# Patient Record
Sex: MALE | Race: WHITE | Employment: FULL TIME | URBAN - METROPOLITAN AREA
[De-identification: names, ages, dates, MRNs, and addresses within clinical notes are randomized per-mention and may not be internally consistent; named-entity substitution may affect disease eponyms.]

---

## 2017-03-16 ENCOUNTER — TELEPHONE (OUTPATIENT)
Dept: FAMILY MEDICINE CLINIC | Age: 42
End: 2017-03-16

## 2017-03-21 DIAGNOSIS — J30.1 SEASONAL ALLERGIC RHINITIS DUE TO POLLEN: ICD-10-CM

## 2017-03-22 ENCOUNTER — OFFICE VISIT (OUTPATIENT)
Dept: FAMILY MEDICINE CLINIC | Age: 42
End: 2017-03-22

## 2017-03-22 VITALS
SYSTOLIC BLOOD PRESSURE: 112 MMHG | BODY MASS INDEX: 23.84 KG/M2 | HEIGHT: 72 IN | DIASTOLIC BLOOD PRESSURE: 64 MMHG | WEIGHT: 176 LBS

## 2017-03-22 DIAGNOSIS — K58.0 IRRITABLE BOWEL SYNDROME WITH DIARRHEA: ICD-10-CM

## 2017-03-22 DIAGNOSIS — A60.02 HERPES SIMPLEX OF MALE GENITALIA: ICD-10-CM

## 2017-03-22 DIAGNOSIS — S90.122A CONTUSION OF LEFT LESSER TOE(S) WITHOUT DAMAGE TO NAIL, INITIAL ENCOUNTER: ICD-10-CM

## 2017-03-22 DIAGNOSIS — J30.1 SEASONAL ALLERGIC RHINITIS DUE TO POLLEN: ICD-10-CM

## 2017-03-22 DIAGNOSIS — Z31.41 FERTILITY TESTING: ICD-10-CM

## 2017-03-22 DIAGNOSIS — E34.9 TESTOSTERONE DEFICIENCY: ICD-10-CM

## 2017-03-22 DIAGNOSIS — L82.1 SEBORRHEIC KERATOSIS: ICD-10-CM

## 2017-03-22 DIAGNOSIS — F41.9 ANXIETY: Primary | ICD-10-CM

## 2017-03-22 PROCEDURE — 99213 OFFICE O/P EST LOW 20 MIN: CPT | Performed by: FAMILY MEDICINE

## 2017-03-24 ENCOUNTER — TELEPHONE (OUTPATIENT)
Dept: FAMILY MEDICINE CLINIC | Age: 42
End: 2017-03-24

## 2017-03-27 DIAGNOSIS — Z31.41 ENCOUNTER FOR SEMEN ANALYSIS: Primary | ICD-10-CM

## 2017-03-27 DIAGNOSIS — Z31.41 FERTILITY TESTING: ICD-10-CM

## 2017-04-04 ENCOUNTER — OFFICE VISIT (OUTPATIENT)
Dept: FAMILY MEDICINE CLINIC | Age: 42
End: 2017-04-04

## 2017-04-04 VITALS
HEIGHT: 72 IN | WEIGHT: 175 LBS | BODY MASS INDEX: 23.7 KG/M2 | SYSTOLIC BLOOD PRESSURE: 120 MMHG | DIASTOLIC BLOOD PRESSURE: 80 MMHG

## 2017-04-04 DIAGNOSIS — F43.21 SITUATIONAL DEPRESSION: ICD-10-CM

## 2017-04-04 DIAGNOSIS — S46.011A ROTATOR CUFF STRAIN, RIGHT, INITIAL ENCOUNTER: Primary | ICD-10-CM

## 2017-04-04 PROCEDURE — 99213 OFFICE O/P EST LOW 20 MIN: CPT | Performed by: FAMILY MEDICINE

## 2017-04-04 ASSESSMENT — ENCOUNTER SYMPTOMS
DIARRHEA: 0
VOMITING: 0
ABDOMINAL DISTENTION: 0
NAUSEA: 0
ABDOMINAL PAIN: 0
CONSTIPATION: 0
BLOOD IN STOOL: 0

## 2017-07-12 ENCOUNTER — OFFICE VISIT (OUTPATIENT)
Dept: FAMILY MEDICINE CLINIC | Age: 42
End: 2017-07-12

## 2017-07-12 VITALS
DIASTOLIC BLOOD PRESSURE: 80 MMHG | HEIGHT: 72 IN | BODY MASS INDEX: 23.7 KG/M2 | WEIGHT: 175 LBS | SYSTOLIC BLOOD PRESSURE: 120 MMHG

## 2017-07-12 DIAGNOSIS — K58.0 IRRITABLE BOWEL SYNDROME WITH DIARRHEA: ICD-10-CM

## 2017-07-12 DIAGNOSIS — L73.9 FOLLICULITIS: ICD-10-CM

## 2017-07-12 DIAGNOSIS — F41.9 ANXIETY: Primary | ICD-10-CM

## 2017-07-12 DIAGNOSIS — B00.89 HERPES DERMATITIS: ICD-10-CM

## 2017-07-12 DIAGNOSIS — J30.1 SEASONAL ALLERGIC RHINITIS DUE TO POLLEN: ICD-10-CM

## 2017-07-12 DIAGNOSIS — E34.9 TESTOSTERONE DEFICIENCY: ICD-10-CM

## 2017-07-12 PROCEDURE — 99213 OFFICE O/P EST LOW 20 MIN: CPT | Performed by: FAMILY MEDICINE

## 2017-07-12 RX ORDER — CEPHALEXIN 500 MG/1
500 CAPSULE ORAL 3 TIMES DAILY
Qty: 21 CAPSULE | Refills: 0 | Status: SHIPPED | OUTPATIENT
Start: 2017-07-12 | End: 2017-07-27 | Stop reason: ALTCHOICE

## 2017-07-24 ENCOUNTER — TELEPHONE (OUTPATIENT)
Dept: ENDOCRINOLOGY | Age: 42
End: 2017-07-24

## 2017-07-24 ENCOUNTER — TELEPHONE (OUTPATIENT)
Dept: FAMILY MEDICINE CLINIC | Age: 42
End: 2017-07-24

## 2017-07-27 ENCOUNTER — OFFICE VISIT (OUTPATIENT)
Dept: ENDOCRINOLOGY | Age: 42
End: 2017-07-27

## 2017-07-27 VITALS
HEART RATE: 90 BPM | BODY MASS INDEX: 23.65 KG/M2 | RESPIRATION RATE: 16 BRPM | SYSTOLIC BLOOD PRESSURE: 120 MMHG | WEIGHT: 174.6 LBS | HEIGHT: 72 IN | DIASTOLIC BLOOD PRESSURE: 78 MMHG

## 2017-07-27 DIAGNOSIS — R79.89 LOW TESTOSTERONE LEVEL IN MALE: ICD-10-CM

## 2017-07-27 DIAGNOSIS — R79.89 LOW TESTOSTERONE: Primary | ICD-10-CM

## 2017-07-27 DIAGNOSIS — R53.83 FATIGUE, UNSPECIFIED TYPE: ICD-10-CM

## 2017-07-27 PROCEDURE — 99204 OFFICE O/P NEW MOD 45 MIN: CPT | Performed by: INTERNAL MEDICINE

## 2017-07-28 DIAGNOSIS — R53.83 OTHER FATIGUE: ICD-10-CM

## 2017-07-28 DIAGNOSIS — R79.89 LOW TESTOSTERONE LEVEL IN MALE: ICD-10-CM

## 2017-07-28 LAB
A/G RATIO: 1.7 (ref 1.1–2.2)
ALBUMIN SERPL-MCNC: 4.3 G/DL (ref 3.4–5)
ALP BLD-CCNC: 61 U/L (ref 40–129)
ALT SERPL-CCNC: 28 U/L (ref 10–40)
ANION GAP SERPL CALCULATED.3IONS-SCNC: 11 MMOL/L (ref 3–16)
AST SERPL-CCNC: 22 U/L (ref 15–37)
BILIRUB SERPL-MCNC: 0.8 MG/DL (ref 0–1)
BUN BLDV-MCNC: 23 MG/DL (ref 7–20)
CALCIUM SERPL-MCNC: 9.6 MG/DL (ref 8.3–10.6)
CHLORIDE BLD-SCNC: 102 MMOL/L (ref 99–110)
CO2: 28 MMOL/L (ref 21–32)
CREAT SERPL-MCNC: 0.9 MG/DL (ref 0.9–1.3)
FERRITIN: 273.9 NG/ML (ref 30–400)
FOLLICLE STIMULATING HORMONE: 7.4 MIU/ML
GFR AFRICAN AMERICAN: >60
GFR NON-AFRICAN AMERICAN: >60
GLOBULIN: 2.6 G/DL
GLUCOSE BLD-MCNC: 76 MG/DL (ref 70–99)
HCT VFR BLD CALC: 44.1 % (ref 40.5–52.5)
HEMOGLOBIN: 14.9 G/DL (ref 13.5–17.5)
LUTEINIZING HORMONE: 4.4 MIU/ML
MCH RBC QN AUTO: 33.4 PG (ref 26–34)
MCHC RBC AUTO-ENTMCNC: 33.7 G/DL (ref 31–36)
MCV RBC AUTO: 99.1 FL (ref 80–100)
PDW BLD-RTO: 13.3 % (ref 12.4–15.4)
PLATELET # BLD: 195 K/UL (ref 135–450)
PMV BLD AUTO: 9.5 FL (ref 5–10.5)
POTASSIUM SERPL-SCNC: 4.7 MMOL/L (ref 3.5–5.1)
PROLACTIN: 10.4 NG/ML
RBC # BLD: 4.45 M/UL (ref 4.2–5.9)
SODIUM BLD-SCNC: 141 MMOL/L (ref 136–145)
TOTAL PROTEIN: 6.9 G/DL (ref 6.4–8.2)
TSH REFLEX: 1.64 UIU/ML (ref 0.27–4.2)
VITAMIN B-12: 852 PG/ML (ref 211–911)
VITAMIN D 25-HYDROXY: 33.3 NG/ML
WBC # BLD: 3.8 K/UL (ref 4–11)

## 2017-07-29 LAB
SEX HORMONE BINDING GLOBULIN: 37 NMOL/L (ref 11–80)
TESTOSTERONE FREE-NONMALE: 73.7 PG/ML (ref 47–244)
TESTOSTERONE TOTAL: 390 NG/DL (ref 220–1000)

## 2017-07-31 ENCOUNTER — TELEPHONE (OUTPATIENT)
Dept: ENDOCRINOLOGY | Age: 42
End: 2017-07-31

## 2017-07-31 RX ORDER — TADALAFIL 10 MG/1
10 TABLET ORAL PRN
Qty: 10 TABLET | Refills: 1 | Status: SHIPPED | OUTPATIENT
Start: 2017-07-31 | End: 2017-11-14 | Stop reason: SDUPTHER

## 2017-08-23 LAB
REASON FOR REJECTION: NORMAL
REJECTED TEST: NORMAL

## 2017-08-24 ENCOUNTER — TELEPHONE (OUTPATIENT)
Dept: ENDOCRINOLOGY | Age: 42
End: 2017-08-24

## 2017-08-24 DIAGNOSIS — N52.9 ERECTILE DYSFUNCTION, UNSPECIFIED ERECTILE DYSFUNCTION TYPE: Primary | ICD-10-CM

## 2017-09-01 LAB
SEX HORMONE BINDING GLOBULIN: 33 NMOL/L (ref 11–80)
TESTOSTERONE FREE-NONMALE: 76.1 PG/ML (ref 47–244)
TESTOSTERONE TOTAL: 377 NG/DL (ref 220–1000)

## 2017-10-11 NOTE — PROGRESS NOTES
reviewed  Medications refilled   Medrol Dosepak as directed  Rotator Cuff Exercises Given  Rest the shoulder until normal  Flu Shot Declined  RTO 3 months for Anxiety / IBS

## 2017-10-12 ENCOUNTER — OFFICE VISIT (OUTPATIENT)
Dept: FAMILY MEDICINE CLINIC | Age: 42
End: 2017-10-12

## 2017-10-12 VITALS
BODY MASS INDEX: 24.65 KG/M2 | WEIGHT: 182 LBS | HEIGHT: 72 IN | SYSTOLIC BLOOD PRESSURE: 120 MMHG | DIASTOLIC BLOOD PRESSURE: 80 MMHG

## 2017-10-12 DIAGNOSIS — Z23 NEED FOR INFLUENZA VACCINATION: ICD-10-CM

## 2017-10-12 DIAGNOSIS — F41.9 ANXIETY: Primary | ICD-10-CM

## 2017-10-12 PROCEDURE — 99213 OFFICE O/P EST LOW 20 MIN: CPT | Performed by: FAMILY MEDICINE

## 2017-10-12 RX ORDER — METHYLPREDNISOLONE 4 MG/1
TABLET ORAL
Qty: 21 TABLET | Refills: 0 | Status: SHIPPED | OUTPATIENT
Start: 2017-10-12 | End: 2018-01-18 | Stop reason: ALTCHOICE

## 2017-10-12 RX ORDER — ALPRAZOLAM 0.5 MG/1
0.5 TABLET ORAL 3 TIMES DAILY PRN
Qty: 45 TABLET | Refills: 0 | Status: SHIPPED | OUTPATIENT
Start: 2017-10-12 | End: 2018-09-25 | Stop reason: SDUPTHER

## 2017-10-12 NOTE — PATIENT INSTRUCTIONS
your injured arm (palm outward) behind your back by the wrist. Pull your arm up gently to stretch your shoulder. 3. Next, put a towel over your other shoulder. Put the hand of your injured arm behind your back. Now hold the back end of the towel. With the other hand, hold the front end of the towel in front of your body. Pull gently on the front end of the towel. This will bring your hand farther up your back to stretch your shoulder. Overhead stretch    1. Standing about an arm's length away, grasp onto a solid surface. You could use a countertop, a doorknob, or the back of a sturdy chair. 2. With your knees slightly bent, bend forward with your arms straight. Lower your upper body, and let your shoulders stretch. 3. As your shoulders are able to stretch farther, you may need to take a step or two backward. 4. Hold for at least 15 to 30 seconds. Then stand up and relax. If you had stepped back during your stretch, step forward so you can keep your hands on the solid surface. 5. Repeat 2 to 4 times. Shoulder flexion (lying down)    Note: To make a wand for this exercise, use a piece of PVC pipe or a broom handle with the broom removed. Make the wand about a foot wider than your shoulders. 1. Lie on your back, holding a wand with both hands. Your palms should face down as you hold the wand. 2. Keeping your elbows straight, slowly raise your arms over your head. Raise them until you feel a stretch in your shoulders, upper back, and chest.  3. Hold for 15 to 30 seconds. 4. Repeat 2 to 4 times. Shoulder rotation (lying down)    Note: To make a wand for this exercise, use a piece of PVC pipe or a broom handle with the broom removed. Make the wand about a foot wider than your shoulders. 1. Lie on your back. Hold a wand with both hands with your elbows bent and palms up. 2. Keep your elbows close to your body, and move the wand across your body toward the sore arm. 3. Hold for 8 to 12 seconds.   4. Repeat 2 to 4 times. Wall climbing (to the side)    Note: Avoid any movement that is straight to your side, and be careful not to arch your back. Your arm should stay about 30 degrees to the front of your side. 1. Stand with your side to a wall so that your fingers can just touch it at an angle about 30 degrees toward the front of your body. 2. Walk the fingers of your injured arm up the wall as high as pain permits. Try not to shrug your shoulder up toward your ear as you move your arm up. 3. Hold that position for a count of at least 15 to 20.  4. Walk your fingers back down to the starting position. 5. Repeat at least 2 to 4 times. Try to reach higher each time. Wall climbing (to the front)    Note: During this stretching exercise, be careful not to arch your back. 1. Face a wall, and stand so your fingers can just touch it. 2. Keeping your shoulder down, walk the fingers of your injured arm up the wall as high as pain permits. (Don't shrug your shoulder up toward your ear.)  3. Hold your arm in that position for at least 15 to 30 seconds. 4. Slowly walk your fingers back down to where you started. 5. Repeat at least 2 to 4 times. Try to reach higher each time. Shoulder blade squeeze    1. Stand with your arms at your sides, and squeeze your shoulder blades together. Do not raise your shoulders up as you squeeze. 2. Hold 6 seconds. 3. Repeat 8 to 12 times. Scapular exercise: Arm reach    1. Lie flat on your back. This exercise is a very slight motion that starts with your arms raised (elbows straight, arms straight). 2. From this position, reach higher toward the dami or ceiling. Keep your elbows straight. All motion should be from your shoulder blade only. 3. Relax your arms back to where you started. 4. Repeat 8 to 12 times. Arm raise to the side    Note: During this strengthening exercise, your arm should stay about 30 degrees to the front of your side.   1. Slowly raise your injured arm to the side, with rolled towel between your elbow and your body for comfort. This will help keep your arm at your side. 3. Hold one end of the elastic band with the hand of the painful arm. 4. Start with your forearm across your belly. Slowly rotate the forearm out away from your body. Keep your elbow and upper arm tucked against the towel roll or the side of your body until you begin to feel tightness in your shoulder. Slowly move your arm back to where you started. 5. Repeat 8 to 12 times. Follow-up care is a key part of your treatment and safety. Be sure to make and go to all appointments, and call your doctor if you are having problems. It's also a good idea to know your test results and keep a list of the medicines you take. Where can you learn more? Go to https://"[employer]pemusaIPexperteb.[employer]Battle. org and sign in to your [employer] account. Enter Jamison Akin in the [employer] box to learn more about \"Rotator Cuff: Exercises. \"     If you do not have an account, please click on the \"Sign Up Now\" link. Current as of: March 21, 2017  Content Version: 11.3  © 4218-2119 [employer]. Care instructions adapted under license by [facility] ([facility]). If you have questions about a medical condition or this instruction, always ask your healthcare professional. [address] any warranty or liability for your use of this information.

## 2017-11-14 RX ORDER — TADALAFIL 10 MG/1
10 TABLET ORAL PRN
Qty: 10 TABLET | Refills: 1 | Status: SHIPPED | OUTPATIENT
Start: 2017-11-14 | End: 2018-01-18

## 2017-12-20 ENCOUNTER — TELEPHONE (OUTPATIENT)
Dept: FAMILY MEDICINE CLINIC | Age: 42
End: 2017-12-20

## 2017-12-22 ENCOUNTER — OFFICE VISIT (OUTPATIENT)
Dept: ORTHOPEDIC SURGERY | Age: 42
End: 2017-12-22

## 2017-12-22 VITALS
RESPIRATION RATE: 12 BRPM | SYSTOLIC BLOOD PRESSURE: 136 MMHG | HEART RATE: 73 BPM | WEIGHT: 185 LBS | DIASTOLIC BLOOD PRESSURE: 68 MMHG | HEIGHT: 73 IN | BODY MASS INDEX: 24.52 KG/M2

## 2017-12-22 DIAGNOSIS — M25.512 LEFT SHOULDER PAIN, UNSPECIFIED CHRONICITY: Primary | ICD-10-CM

## 2017-12-22 DIAGNOSIS — M19.012 ARTHRITIS OF LEFT ACROMIOCLAVICULAR JOINT: ICD-10-CM

## 2017-12-22 PROCEDURE — 20610 DRAIN/INJ JOINT/BURSA W/O US: CPT | Performed by: ORTHOPAEDIC SURGERY

## 2017-12-22 PROCEDURE — 99243 OFF/OP CNSLTJ NEW/EST LOW 30: CPT | Performed by: ORTHOPAEDIC SURGERY

## 2017-12-22 ASSESSMENT — ENCOUNTER SYMPTOMS
GASTROINTESTINAL NEGATIVE: 1
RESPIRATORY NEGATIVE: 1
ALLERGIC/IMMUNOLOGIC NEGATIVE: 1
EYES NEGATIVE: 1
BACK PAIN: 1

## 2018-01-18 ENCOUNTER — OFFICE VISIT (OUTPATIENT)
Dept: ENDOCRINOLOGY | Age: 43
End: 2018-01-18

## 2018-01-18 VITALS
BODY MASS INDEX: 25.19 KG/M2 | RESPIRATION RATE: 16 BRPM | WEIGHT: 186 LBS | SYSTOLIC BLOOD PRESSURE: 122 MMHG | HEIGHT: 72 IN | DIASTOLIC BLOOD PRESSURE: 78 MMHG | HEART RATE: 98 BPM

## 2018-01-18 DIAGNOSIS — E34.9 TESTOSTERONE DEFICIENCY: Primary | ICD-10-CM

## 2018-01-18 PROCEDURE — 99214 OFFICE O/P EST MOD 30 MIN: CPT | Performed by: INTERNAL MEDICINE

## 2018-01-18 RX ORDER — TESTOSTERONE CYPIONATE 200 MG/ML
INJECTION INTRAMUSCULAR
Qty: 10 ML | Refills: 0 | Status: SHIPPED | OUTPATIENT
Start: 2018-01-18 | End: 2018-05-02 | Stop reason: SDUPTHER

## 2018-01-18 RX ORDER — SYRINGE W-NEEDLE,DISPOSAB,3 ML 25GX5/8"
SYRINGE, EMPTY DISPOSABLE MISCELLANEOUS
Qty: 12 EACH | Refills: 0 | Status: SHIPPED | OUTPATIENT
Start: 2018-01-18 | End: 2018-05-02 | Stop reason: SDUPTHER

## 2018-01-18 NOTE — PROGRESS NOTES
 valACYclovir (VALTREX) 1 G tablet TAKE 2 TABLETS TWICE DAILY FOR ONE DAY FOR OUTBREAKS. 4 tablet 0     No current facility-administered medications for this visit. Review of Systems  Please see scanned document dated and signed      Objective:      /78 (Site: Right Arm, Position: Sitting, Cuff Size: Medium Adult)   Pulse 98   Resp 16   Ht 6' (1.829 m)   Wt 186 lb (84.4 kg)   BMI 25.23 kg/m²   Wt Readings from Last 3 Encounters:   01/18/18 186 lb (84.4 kg)   12/22/17 185 lb (83.9 kg)   10/12/17 182 lb (82.6 kg)     Constitutional: Well-developed, alert, appears stated age, cooperative, in no acute distress  H/E/N/M/T:atraumatic, normocephalic, external ears, nose, lips normal without lesions  Eyes: extraocular muscles are intact  Neck: supple, trachea midline, acanthosis nigricance is not present. Skin: Xanthoma/Xanthelasmas are  not present  Psychiatric: Judgement and Insight:  judgement and insight appear normal  Neuro: Normal without focal findings, speech is spontaneous, and movements are coordinated    Lab Reviewed       Assessment: 1. Low Testosterone: Symptomatic, levels are low normal, clomid did not help, will start on low dose testosterone injection given previous history. Discussed side effect of replacement therapy. 2.ED: Improved with cialis    Plan: 1. Testosterone 100mg every 2 weeks  2. CBC, testosterone before next visit, peak level

## 2018-01-25 ENCOUNTER — NURSE ONLY (OUTPATIENT)
Age: 43
End: 2018-01-25

## 2018-01-25 DIAGNOSIS — E34.9 TESTOSTERONE DEFICIENCY: Primary | ICD-10-CM

## 2018-01-25 PROCEDURE — 96372 THER/PROPH/DIAG INJ SC/IM: CPT | Performed by: INTERNAL MEDICINE

## 2018-01-25 RX ORDER — TESTOSTERONE CYPIONATE 200 MG/ML
100 INJECTION INTRAMUSCULAR ONCE
Status: COMPLETED | OUTPATIENT
Start: 2018-01-25 | End: 2018-01-25

## 2018-01-25 RX ADMIN — TESTOSTERONE CYPIONATE 100 MG: 200 INJECTION INTRAMUSCULAR at 13:12

## 2018-01-25 NOTE — PROGRESS NOTES
Testosterone given per physician order. Patient supplied the medication. If any signs of redness, rash, swelling or unusual symptoms occur please call the office.

## 2018-02-08 ENCOUNTER — NURSE ONLY (OUTPATIENT)
Age: 43
End: 2018-02-08

## 2018-02-08 DIAGNOSIS — E34.9 TESTOSTERONE DEFICIENCY: Primary | ICD-10-CM

## 2018-02-08 PROCEDURE — 96372 THER/PROPH/DIAG INJ SC/IM: CPT | Performed by: INTERNAL MEDICINE

## 2018-02-08 RX ORDER — TESTOSTERONE CYPIONATE 200 MG/ML
100 INJECTION INTRAMUSCULAR ONCE
Status: COMPLETED | OUTPATIENT
Start: 2018-02-08 | End: 2018-02-08

## 2018-02-08 RX ADMIN — TESTOSTERONE CYPIONATE 100 MG: 200 INJECTION INTRAMUSCULAR at 11:48

## 2018-02-22 ENCOUNTER — NURSE ONLY (OUTPATIENT)
Age: 43
End: 2018-02-22

## 2018-02-22 DIAGNOSIS — E34.9 TESTOSTERONE DEFICIENCY: Primary | ICD-10-CM

## 2018-02-22 PROCEDURE — 96372 THER/PROPH/DIAG INJ SC/IM: CPT | Performed by: INTERNAL MEDICINE

## 2018-02-22 RX ORDER — TESTOSTERONE CYPIONATE 200 MG/ML
100 INJECTION INTRAMUSCULAR ONCE
Status: COMPLETED | OUTPATIENT
Start: 2018-02-22 | End: 2018-02-22

## 2018-02-22 RX ADMIN — TESTOSTERONE CYPIONATE 100 MG: 200 INJECTION INTRAMUSCULAR at 11:25

## 2018-02-26 ENCOUNTER — TELEPHONE (OUTPATIENT)
Dept: ORTHOPEDIC SURGERY | Age: 43
End: 2018-02-26

## 2018-02-26 DIAGNOSIS — M25.512 LEFT SHOULDER PAIN, UNSPECIFIED CHRONICITY: ICD-10-CM

## 2018-02-26 DIAGNOSIS — M19.012 ARTHRITIS OF LEFT ACROMIOCLAVICULAR JOINT: Primary | ICD-10-CM

## 2018-02-26 NOTE — TELEPHONE ENCOUNTER
Patient called with complaints of left shoulder pain. Patient is asking what the next step in treatment would be. Per Dr. Karen Dacosta, order MRI. Patient scheduled for MRI and follow up appointment.

## 2018-03-05 ENCOUNTER — OFFICE VISIT (OUTPATIENT)
Dept: FAMILY MEDICINE CLINIC | Age: 43
End: 2018-03-05

## 2018-03-05 ENCOUNTER — OFFICE VISIT (OUTPATIENT)
Dept: ORTHOPEDIC SURGERY | Age: 43
End: 2018-03-05

## 2018-03-05 VITALS
BODY MASS INDEX: 24.52 KG/M2 | WEIGHT: 185 LBS | HEIGHT: 73 IN | HEART RATE: 62 BPM | SYSTOLIC BLOOD PRESSURE: 132 MMHG | DIASTOLIC BLOOD PRESSURE: 79 MMHG

## 2018-03-05 VITALS
HEIGHT: 72 IN | BODY MASS INDEX: 25.33 KG/M2 | DIASTOLIC BLOOD PRESSURE: 80 MMHG | SYSTOLIC BLOOD PRESSURE: 120 MMHG | WEIGHT: 187 LBS

## 2018-03-05 DIAGNOSIS — J01.10 ACUTE NON-RECURRENT FRONTAL SINUSITIS: ICD-10-CM

## 2018-03-05 DIAGNOSIS — M19.012 ARTHRITIS OF LEFT ACROMIOCLAVICULAR JOINT: Primary | ICD-10-CM

## 2018-03-05 DIAGNOSIS — F41.9 ANXIETY: Primary | ICD-10-CM

## 2018-03-05 DIAGNOSIS — M25.512 ACUTE PAIN OF LEFT SHOULDER: ICD-10-CM

## 2018-03-05 PROCEDURE — 99213 OFFICE O/P EST LOW 20 MIN: CPT | Performed by: ORTHOPAEDIC SURGERY

## 2018-03-05 PROCEDURE — 99213 OFFICE O/P EST LOW 20 MIN: CPT | Performed by: FAMILY MEDICINE

## 2018-03-05 PROCEDURE — 20610 DRAIN/INJ JOINT/BURSA W/O US: CPT | Performed by: ORTHOPAEDIC SURGERY

## 2018-03-05 RX ORDER — GUAIFENESIN AND PSEUDOEPHEDRINE HCL 1200; 120 MG/1; MG/1
1 TABLET, EXTENDED RELEASE ORAL 2 TIMES DAILY PRN
Qty: 20 TABLET | Refills: 0 | Status: SHIPPED | OUTPATIENT
Start: 2018-03-05 | End: 2018-09-25

## 2018-03-05 RX ORDER — AMOXICILLIN 875 MG/1
875 TABLET, COATED ORAL 2 TIMES DAILY
Qty: 20 TABLET | Refills: 0 | Status: SHIPPED | OUTPATIENT
Start: 2018-03-05 | End: 2018-03-15

## 2018-03-05 ASSESSMENT — ENCOUNTER SYMPTOMS
COUGH: 1
SINUS PRESSURE: 1
WHEEZING: 0
SHORTNESS OF BREATH: 1
SINUS PAIN: 1
SORE THROAT: 1
RHINORRHEA: 1

## 2018-03-05 NOTE — PROGRESS NOTES
Subjective:      Patient ID: Lance Colorado is a 40 y.o. male. HPI     Anxiety:  Pt continues to see his counselor occasionally. He takes Xanax 0.5 mg TID as needed but rarely needs to take it and cuts it in half. He feels that the medication works well when taken. Upper Respiratory Infection:  Pt started mid January with posterior pharyngeal drainage, harsh productive cough and dyspnea. He has frontal sinus pressure and had some blood in the nasal mucous. He has been eating more fruits and vegetables. He is not a smoker. Review of Systems   Constitutional: Negative for chills and fever. HENT: Positive for congestion, postnasal drip, rhinorrhea, sinus pain, sinus pressure and sore throat. Negative for sneezing. Respiratory: Positive for cough and shortness of breath. Negative for wheezing. Hematological: Positive for adenopathy. /80 (Site: Right Arm)   Ht 6' (1.829 m)   Wt 187 lb (84.8 kg)   BMI 25.36 kg/m²    Objective:   Physical Exam    Assessment:      Anxiety  Acute Frontal Sinusitis       Plan:       Amoxicillin 875 mg  #20   1 by mouth BID x 10 days. Mucinex-D  1 by mouth every 12 hrs as needed for congestion. Increase fluids   OARRS report was reviewed  Medications refilled   RTO 3 months for Anxiety    Controlled Substances Monitoring: The Prescription Monitoring Report for this patient was reviewed today. (Samanta Caban DO)    No signs of potential drug abuse or diversion identified. Samanta Caban DO)              Existing medication contract.  Samanta Caban DO)

## 2018-03-08 ENCOUNTER — NURSE ONLY (OUTPATIENT)
Age: 43
End: 2018-03-08

## 2018-03-08 DIAGNOSIS — E34.9 TESTOSTERONE DEFICIENCY: Primary | ICD-10-CM

## 2018-03-08 PROCEDURE — 96372 THER/PROPH/DIAG INJ SC/IM: CPT | Performed by: INTERNAL MEDICINE

## 2018-03-08 RX ORDER — TESTOSTERONE CYPIONATE 200 MG/ML
100 INJECTION INTRAMUSCULAR ONCE
Status: COMPLETED | OUTPATIENT
Start: 2018-03-08 | End: 2018-03-08

## 2018-03-08 RX ADMIN — TESTOSTERONE CYPIONATE 100 MG: 200 INJECTION INTRAMUSCULAR at 10:38

## 2018-03-19 DIAGNOSIS — E34.9 TESTOSTERONE DEFICIENCY: Primary | ICD-10-CM

## 2018-03-19 RX ORDER — TESTOSTERONE CYPIONATE 200 MG/ML
100 INJECTION INTRAMUSCULAR ONCE
Status: COMPLETED | OUTPATIENT
Start: 2018-03-19 | End: 2018-03-22

## 2018-03-21 ENCOUNTER — TELEPHONE (OUTPATIENT)
Dept: FAMILY MEDICINE CLINIC | Age: 43
End: 2018-03-21

## 2018-03-21 RX ORDER — SULFAMETHOXAZOLE AND TRIMETHOPRIM 800; 160 MG/1; MG/1
1 TABLET ORAL 2 TIMES DAILY
Qty: 20 TABLET | Refills: 0 | Status: SHIPPED | OUTPATIENT
Start: 2018-03-21 | End: 2018-03-31

## 2018-03-21 NOTE — TELEPHONE ENCOUNTER
I have sent an Rx for Bactrim DS to [facility] for 10 days. If no better after that, I will need to see him back.

## 2018-03-21 NOTE — TELEPHONE ENCOUNTER
Patient was in two weeks ago and was given amoxicillan and mucinex D. He is still having a bad cough and it gives him horrible heartburn. He has a lot of mucas that is dark greenish. He does not have a fever. Please call back and let him know what to do. Does he need another antibiotic or an appt.

## 2018-03-22 ENCOUNTER — NURSE ONLY (OUTPATIENT)
Age: 43
End: 2018-03-22

## 2018-03-22 DIAGNOSIS — E34.9 TESTOSTERONE DEFICIENCY: Primary | ICD-10-CM

## 2018-03-22 PROCEDURE — 96372 THER/PROPH/DIAG INJ SC/IM: CPT | Performed by: INTERNAL MEDICINE

## 2018-03-22 RX ADMIN — TESTOSTERONE CYPIONATE 100 MG: 200 INJECTION INTRAMUSCULAR at 10:31

## 2018-04-05 ENCOUNTER — NURSE ONLY (OUTPATIENT)
Age: 43
End: 2018-04-05

## 2018-04-05 DIAGNOSIS — E34.9 TESTOSTERONE DEFICIENCY: Primary | ICD-10-CM

## 2018-04-05 PROCEDURE — 96372 THER/PROPH/DIAG INJ SC/IM: CPT | Performed by: INTERNAL MEDICINE

## 2018-04-05 RX ORDER — TESTOSTERONE CYPIONATE 200 MG/ML
100 INJECTION INTRAMUSCULAR ONCE
Status: COMPLETED | OUTPATIENT
Start: 2018-04-05 | End: 2018-04-05

## 2018-04-05 RX ADMIN — TESTOSTERONE CYPIONATE 100 MG: 200 INJECTION INTRAMUSCULAR at 10:45

## 2018-04-09 DIAGNOSIS — E34.9 TESTOSTERONE DEFICIENCY: ICD-10-CM

## 2018-04-09 LAB
HCT VFR BLD CALC: 42.9 % (ref 40.5–52.5)
HEMOGLOBIN: 14.8 G/DL (ref 13.5–17.5)
MCH RBC QN AUTO: 33.3 PG (ref 26–34)
MCHC RBC AUTO-ENTMCNC: 34.5 G/DL (ref 31–36)
MCV RBC AUTO: 96.6 FL (ref 80–100)
PDW BLD-RTO: 12.8 % (ref 12.4–15.4)
PLATELET # BLD: 207 K/UL (ref 135–450)
PMV BLD AUTO: 9.3 FL (ref 5–10.5)
RBC # BLD: 4.44 M/UL (ref 4.2–5.9)
WBC # BLD: 4.6 K/UL (ref 4–11)

## 2018-04-11 LAB
SEX HORMONE BINDING GLOBULIN: 35 NMOL/L (ref 11–80)
TESTOSTERONE FREE-NONMALE: 177.4 PG/ML (ref 47–244)
TESTOSTERONE TOTAL: 800 NG/DL (ref 220–1000)

## 2018-04-20 ENCOUNTER — TELEPHONE (OUTPATIENT)
Dept: ENDOCRINOLOGY | Age: 43
End: 2018-04-20

## 2018-04-30 ENCOUNTER — TELEPHONE (OUTPATIENT)
Dept: FAMILY MEDICINE CLINIC | Age: 43
End: 2018-04-30

## 2018-04-30 RX ORDER — VALACYCLOVIR HYDROCHLORIDE 1 G/1
TABLET, FILM COATED ORAL
Qty: 4 TABLET | Refills: 0 | Status: SHIPPED | OUTPATIENT
Start: 2018-04-30 | End: 2018-11-07

## 2018-05-02 ENCOUNTER — OFFICE VISIT (OUTPATIENT)
Dept: ENDOCRINOLOGY | Age: 43
End: 2018-05-02

## 2018-05-02 VITALS
SYSTOLIC BLOOD PRESSURE: 122 MMHG | TEMPERATURE: 98.4 F | BODY MASS INDEX: 25.36 KG/M2 | WEIGHT: 187 LBS | DIASTOLIC BLOOD PRESSURE: 68 MMHG

## 2018-05-02 DIAGNOSIS — E34.9 TESTOSTERONE DEFICIENCY: Primary | ICD-10-CM

## 2018-05-02 PROCEDURE — 99213 OFFICE O/P EST LOW 20 MIN: CPT | Performed by: INTERNAL MEDICINE

## 2018-05-02 RX ORDER — SYRINGE W-NEEDLE,DISPOSAB,3 ML 25GX5/8"
SYRINGE, EMPTY DISPOSABLE MISCELLANEOUS
Qty: 12 EACH | Refills: 0 | Status: SHIPPED | OUTPATIENT
Start: 2018-05-02 | End: 2018-10-20 | Stop reason: SDUPTHER

## 2018-05-02 RX ORDER — TESTOSTERONE CYPIONATE 200 MG/ML
INJECTION INTRAMUSCULAR
Qty: 10 ML | Refills: 0 | Status: SHIPPED | OUTPATIENT
Start: 2018-05-02 | End: 2018-11-02 | Stop reason: SDUPTHER

## 2018-05-10 ENCOUNTER — NURSE ONLY (OUTPATIENT)
Age: 43
End: 2018-05-10

## 2018-05-10 DIAGNOSIS — E34.9 TESTOSTERONE DEFICIENCY: Primary | ICD-10-CM

## 2018-05-10 PROCEDURE — 99999 PR OFFICE/OUTPT VISIT,PROCEDURE ONLY: CPT | Performed by: INTERNAL MEDICINE

## 2018-05-10 PROCEDURE — 96372 THER/PROPH/DIAG INJ SC/IM: CPT | Performed by: INTERNAL MEDICINE

## 2018-05-10 RX ORDER — TESTOSTERONE CYPIONATE 200 MG/ML
100 INJECTION INTRAMUSCULAR ONCE
Status: COMPLETED | OUTPATIENT
Start: 2018-05-10 | End: 2018-05-10

## 2018-05-10 RX ADMIN — TESTOSTERONE CYPIONATE 100 MG: 200 INJECTION INTRAMUSCULAR at 10:55

## 2018-05-23 RX ORDER — TADALAFIL 10 MG/1
10 TABLET ORAL PRN
Qty: 30 TABLET | Refills: 3 | Status: SHIPPED | OUTPATIENT
Start: 2018-05-23 | End: 2018-11-07

## 2018-05-24 ENCOUNTER — NURSE ONLY (OUTPATIENT)
Age: 43
End: 2018-05-24

## 2018-05-24 DIAGNOSIS — E34.9 TESTOSTERONE DEFICIENCY: Primary | ICD-10-CM

## 2018-05-24 PROCEDURE — 96372 THER/PROPH/DIAG INJ SC/IM: CPT | Performed by: INTERNAL MEDICINE

## 2018-05-24 PROCEDURE — 99999 PR OFFICE/OUTPT VISIT,PROCEDURE ONLY: CPT | Performed by: INTERNAL MEDICINE

## 2018-05-24 RX ORDER — TESTOSTERONE CYPIONATE 200 MG/ML
100 INJECTION INTRAMUSCULAR ONCE
Status: COMPLETED | OUTPATIENT
Start: 2018-05-24 | End: 2018-05-24

## 2018-05-24 RX ADMIN — TESTOSTERONE CYPIONATE 100 MG: 200 INJECTION INTRAMUSCULAR at 10:31

## 2018-05-31 DIAGNOSIS — E34.9 TESTOSTERONE DEFICIENCY: Primary | ICD-10-CM

## 2018-05-31 RX ORDER — TESTOSTERONE CYPIONATE 200 MG/ML
200 INJECTION INTRAMUSCULAR ONCE
Status: COMPLETED | OUTPATIENT
Start: 2018-06-07 | End: 2018-06-07

## 2018-06-01 ENCOUNTER — OFFICE VISIT (OUTPATIENT)
Dept: ORTHOPEDIC SURGERY | Age: 43
End: 2018-06-01

## 2018-06-01 VITALS
HEART RATE: 63 BPM | WEIGHT: 185 LBS | SYSTOLIC BLOOD PRESSURE: 120 MMHG | BODY MASS INDEX: 24.52 KG/M2 | DIASTOLIC BLOOD PRESSURE: 80 MMHG | HEIGHT: 73 IN

## 2018-06-01 DIAGNOSIS — S83.241A TEAR OF MEDIAL MENISCUS OF RIGHT KNEE, CURRENT, UNSPECIFIED TEAR TYPE, INITIAL ENCOUNTER: ICD-10-CM

## 2018-06-01 DIAGNOSIS — M25.561 ACUTE PAIN OF RIGHT KNEE: Primary | ICD-10-CM

## 2018-06-01 PROCEDURE — 99214 OFFICE O/P EST MOD 30 MIN: CPT | Performed by: ORTHOPAEDIC SURGERY

## 2018-06-01 ASSESSMENT — ENCOUNTER SYMPTOMS
EYES NEGATIVE: 1
GASTROINTESTINAL NEGATIVE: 1
RESPIRATORY NEGATIVE: 1

## 2018-06-07 ENCOUNTER — NURSE ONLY (OUTPATIENT)
Age: 43
End: 2018-06-07

## 2018-06-07 DIAGNOSIS — E34.9 TESTOSTERONE DEFICIENCY: Primary | ICD-10-CM

## 2018-06-07 PROCEDURE — 96372 THER/PROPH/DIAG INJ SC/IM: CPT | Performed by: INTERNAL MEDICINE

## 2018-06-07 PROCEDURE — 99999 PR OFFICE/OUTPT VISIT,PROCEDURE ONLY: CPT | Performed by: INTERNAL MEDICINE

## 2018-06-07 RX ADMIN — TESTOSTERONE CYPIONATE 200 MG: 200 INJECTION INTRAMUSCULAR at 10:28

## 2018-06-08 ENCOUNTER — OFFICE VISIT (OUTPATIENT)
Dept: ORTHOPEDIC SURGERY | Age: 43
End: 2018-06-08

## 2018-06-08 VITALS
DIASTOLIC BLOOD PRESSURE: 66 MMHG | WEIGHT: 185 LBS | SYSTOLIC BLOOD PRESSURE: 124 MMHG | HEART RATE: 63 BPM | BODY MASS INDEX: 24.52 KG/M2 | HEIGHT: 73 IN

## 2018-06-08 DIAGNOSIS — S83.231A COMPLEX TEAR OF MEDIAL MENISCUS OF RIGHT KNEE AS CURRENT INJURY, INITIAL ENCOUNTER: ICD-10-CM

## 2018-06-08 DIAGNOSIS — M25.561 ACUTE PAIN OF RIGHT KNEE: Primary | ICD-10-CM

## 2018-06-08 PROCEDURE — 99213 OFFICE O/P EST LOW 20 MIN: CPT | Performed by: ORTHOPAEDIC SURGERY

## 2018-06-08 RX ORDER — METHYLPREDNISOLONE 4 MG/1
TABLET ORAL
Qty: 1 KIT | Refills: 0 | Status: SHIPPED | OUTPATIENT
Start: 2018-06-08 | End: 2018-09-25

## 2018-06-13 DIAGNOSIS — E34.9 TESTOSTERONE DEFICIENCY: Primary | ICD-10-CM

## 2018-06-13 RX ORDER — TESTOSTERONE CYPIONATE 200 MG/ML
200 INJECTION INTRAMUSCULAR ONCE
Status: COMPLETED | OUTPATIENT
Start: 2018-06-21 | End: 2018-06-21

## 2018-06-21 ENCOUNTER — NURSE ONLY (OUTPATIENT)
Age: 43
End: 2018-06-21

## 2018-06-21 DIAGNOSIS — E34.9 TESTOSTERONE DEFICIENCY: Primary | ICD-10-CM

## 2018-06-21 PROCEDURE — 96372 THER/PROPH/DIAG INJ SC/IM: CPT | Performed by: INTERNAL MEDICINE

## 2018-06-21 PROCEDURE — 99999 PR OFFICE/OUTPT VISIT,PROCEDURE ONLY: CPT | Performed by: INTERNAL MEDICINE

## 2018-06-21 RX ADMIN — TESTOSTERONE CYPIONATE 200 MG: 200 INJECTION INTRAMUSCULAR at 09:57

## 2018-06-28 DIAGNOSIS — E34.9 TESTOSTERONE DEFICIENCY: Primary | ICD-10-CM

## 2018-06-28 RX ORDER — TESTOSTERONE CYPIONATE 200 MG/ML
100 INJECTION INTRAMUSCULAR ONCE
Status: COMPLETED | OUTPATIENT
Start: 2018-07-05 | End: 2018-07-05

## 2018-07-05 ENCOUNTER — NURSE ONLY (OUTPATIENT)
Age: 43
End: 2018-07-05

## 2018-07-05 DIAGNOSIS — E34.9 TESTOSTERONE DEFICIENCY: Primary | ICD-10-CM

## 2018-07-05 PROCEDURE — 99999 PR OFFICE/OUTPT VISIT,PROCEDURE ONLY: CPT | Performed by: INTERNAL MEDICINE

## 2018-07-05 PROCEDURE — 96372 THER/PROPH/DIAG INJ SC/IM: CPT | Performed by: INTERNAL MEDICINE

## 2018-07-05 RX ADMIN — TESTOSTERONE CYPIONATE 100 MG: 200 INJECTION INTRAMUSCULAR at 10:09

## 2018-07-05 NOTE — PATIENT INSTRUCTIONS
Testosterone given per physician order. Patient supplied the medication. If any signs of redness, rash, swelling or unusual symptoms occur please call the office.   [id number] LOt [id number] exp 11/2019

## 2018-07-20 DIAGNOSIS — E34.9 TESTOSTERONE DEFICIENCY: Primary | ICD-10-CM

## 2018-07-20 RX ORDER — TESTOSTERONE CYPIONATE 200 MG/ML
100 INJECTION INTRAMUSCULAR ONCE
Status: COMPLETED | OUTPATIENT
Start: 2018-07-26 | End: 2018-07-26

## 2018-07-26 ENCOUNTER — NURSE ONLY (OUTPATIENT)
Dept: ENDOCRINOLOGY | Age: 43
End: 2018-07-26

## 2018-07-26 DIAGNOSIS — E34.9 TESTOSTERONE DEFICIENCY: Primary | ICD-10-CM

## 2018-07-26 PROCEDURE — 96372 THER/PROPH/DIAG INJ SC/IM: CPT | Performed by: INTERNAL MEDICINE

## 2018-07-26 PROCEDURE — 99999 PR OFFICE/OUTPT VISIT,PROCEDURE ONLY: CPT | Performed by: INTERNAL MEDICINE

## 2018-07-26 RX ADMIN — TESTOSTERONE CYPIONATE 100 MG: 200 INJECTION INTRAMUSCULAR at 10:40

## 2018-07-26 NOTE — PATIENT INSTRUCTIONS
Testosterone given per physician order. Patient supplied the medication. If any signs of redness, rash, swelling or unusual symptoms occur please call the office.   Nit[address] 47 [id number] Lot [id number] exp 11/2019

## 2018-08-03 DIAGNOSIS — E34.9 TESTOSTERONE DEFICIENCY: Primary | ICD-10-CM

## 2018-08-03 RX ORDER — TESTOSTERONE CYPIONATE 200 MG/ML
100 INJECTION INTRAMUSCULAR ONCE
Status: COMPLETED | OUTPATIENT
Start: 2018-08-09 | End: 2018-08-09

## 2018-08-09 ENCOUNTER — NURSE ONLY (OUTPATIENT)
Dept: ENDOCRINOLOGY | Age: 43
End: 2018-08-09

## 2018-08-09 DIAGNOSIS — E34.9 TESTOSTERONE DEFICIENCY: Primary | ICD-10-CM

## 2018-08-09 PROCEDURE — 96372 THER/PROPH/DIAG INJ SC/IM: CPT | Performed by: INTERNAL MEDICINE

## 2018-08-09 PROCEDURE — 99999 PR OFFICE/OUTPT VISIT,PROCEDURE ONLY: CPT | Performed by: INTERNAL MEDICINE

## 2018-08-09 RX ADMIN — TESTOSTERONE CYPIONATE 100 MG: 200 INJECTION INTRAMUSCULAR at 09:49

## 2018-08-21 DIAGNOSIS — E34.9 TESTOSTERONE DEFICIENCY: Primary | ICD-10-CM

## 2018-08-21 RX ORDER — TESTOSTERONE CYPIONATE 200 MG/ML
100 INJECTION INTRAMUSCULAR ONCE
Status: COMPLETED | OUTPATIENT
Start: 2018-08-23 | End: 2018-08-23

## 2018-08-23 ENCOUNTER — NURSE ONLY (OUTPATIENT)
Age: 43
End: 2018-08-23

## 2018-08-23 DIAGNOSIS — E34.9 TESTOSTERONE DEFICIENCY: Primary | ICD-10-CM

## 2018-08-23 PROCEDURE — 99999 PR OFFICE/OUTPT VISIT,PROCEDURE ONLY: CPT | Performed by: INTERNAL MEDICINE

## 2018-08-23 PROCEDURE — 96372 THER/PROPH/DIAG INJ SC/IM: CPT | Performed by: INTERNAL MEDICINE

## 2018-08-23 RX ADMIN — TESTOSTERONE CYPIONATE 100 MG: 200 INJECTION INTRAMUSCULAR at 09:45

## 2018-08-28 DIAGNOSIS — E34.9 TESTOSTERONE DEFICIENCY: Primary | ICD-10-CM

## 2018-08-28 RX ORDER — TESTOSTERONE CYPIONATE 200 MG/ML
100 INJECTION INTRAMUSCULAR ONCE
Status: COMPLETED | OUTPATIENT
Start: 2018-09-06 | End: 2018-09-06

## 2018-09-06 ENCOUNTER — NURSE ONLY (OUTPATIENT)
Dept: ENDOCRINOLOGY | Age: 43
End: 2018-09-06

## 2018-09-06 DIAGNOSIS — E34.9 TESTOSTERONE DEFICIENCY: Primary | ICD-10-CM

## 2018-09-06 PROCEDURE — 96372 THER/PROPH/DIAG INJ SC/IM: CPT | Performed by: INTERNAL MEDICINE

## 2018-09-06 PROCEDURE — 99999 PR OFFICE/OUTPT VISIT,PROCEDURE ONLY: CPT | Performed by: INTERNAL MEDICINE

## 2018-09-06 RX ADMIN — TESTOSTERONE CYPIONATE 100 MG: 200 INJECTION INTRAMUSCULAR at 09:50

## 2018-09-14 DIAGNOSIS — E34.9 TESTOSTERONE DEFICIENCY: Primary | ICD-10-CM

## 2018-09-14 RX ORDER — TESTOSTERONE CYPIONATE 200 MG/ML
100 INJECTION INTRAMUSCULAR ONCE
Status: COMPLETED | OUTPATIENT
Start: 2018-09-14 | End: 2018-09-27

## 2018-09-25 ENCOUNTER — OFFICE VISIT (OUTPATIENT)
Dept: FAMILY MEDICINE CLINIC | Age: 43
End: 2018-09-25
Payer: COMMERCIAL

## 2018-09-25 VITALS
BODY MASS INDEX: 25.87 KG/M2 | DIASTOLIC BLOOD PRESSURE: 80 MMHG | WEIGHT: 191 LBS | SYSTOLIC BLOOD PRESSURE: 124 MMHG | HEIGHT: 72 IN

## 2018-09-25 DIAGNOSIS — E34.9 TESTOSTERONE DEFICIENCY: ICD-10-CM

## 2018-09-25 DIAGNOSIS — R20.0 RIGHT ARM NUMBNESS: ICD-10-CM

## 2018-09-25 DIAGNOSIS — N52.9 ERECTILE DYSFUNCTION OF ORGANIC ORIGIN: ICD-10-CM

## 2018-09-25 DIAGNOSIS — F41.9 ANXIETY: Primary | ICD-10-CM

## 2018-09-25 DIAGNOSIS — Z23 NEED FOR INFLUENZA VACCINATION: ICD-10-CM

## 2018-09-25 PROCEDURE — 99213 OFFICE O/P EST LOW 20 MIN: CPT | Performed by: FAMILY MEDICINE

## 2018-09-25 RX ORDER — METHYLPREDNISOLONE 4 MG/1
TABLET ORAL
Qty: 21 TABLET | Refills: 0 | Status: SHIPPED | OUTPATIENT
Start: 2018-09-25 | End: 2018-11-02

## 2018-09-25 RX ORDER — ALPRAZOLAM 0.5 MG/1
0.5 TABLET ORAL 3 TIMES DAILY PRN
Qty: 45 TABLET | Refills: 0 | Status: SHIPPED | OUTPATIENT
Start: 2018-09-25 | End: 2018-10-25

## 2018-09-25 ASSESSMENT — PATIENT HEALTH QUESTIONNAIRE - PHQ9
2. FEELING DOWN, DEPRESSED OR HOPELESS: 1
SUM OF ALL RESPONSES TO PHQ QUESTIONS 1-9: 2
SUM OF ALL RESPONSES TO PHQ QUESTIONS 1-9: 2
1. LITTLE INTEREST OR PLEASURE IN DOING THINGS: 1
SUM OF ALL RESPONSES TO PHQ9 QUESTIONS 1 & 2: 2

## 2018-09-25 NOTE — PROGRESS NOTES
Subjective:      Patient ID: Camille Kingston is a 40 y.o. male. HPI     Anxiety:  Patient continues to see his counselor occasionally. Hyacinth Schroeder takes Xanax 0.5 mg TID as needed but rarely needs to take it and cuts it in half.  He feels that the medication works well when taken. Testosterone Deficiency:  Patient sees Dr. Aleteha Diallo and is receiving testosterone injections every 2 weeks. Erectile Dysfunction:  Patient takes Cialis 10 mg daily as needed and feels that it works well when taken. Right Arm Numbness:  Patient started with pain in the thenar eminence several weeks ago. Over the same time, he has had intermittent numbness in the entire right arm throughout the day. Ne denies any pain or numbness in the neck. He is right handed. He feels like his fingers want to ball into a fist.      Review of Systems   Constitutional: Negative for chills and fever. Neurological: Positive for numbness. /80   Ht 6' (1.829 m)   Wt 191 lb (86.6 kg)   BMI 25.90 kg/m²    Objective:   Physical Exam   Constitutional: He is oriented to person, place, and time. He appears well-developed and well-nourished. No distress. HENT:   Head: Normocephalic. Right Ear: External ear normal.   Left Ear: External ear normal.   Mouth/Throat: Oropharynx is clear and moist. No oropharyngeal exudate. Neck: No JVD present. No thyromegaly present. Cardiovascular: Normal rate, regular rhythm, normal heart sounds and intact distal pulses. No murmur heard. Pulmonary/Chest: Effort normal and breath sounds normal. He has no wheezes. He has no rales. Musculoskeletal: He exhibits no edema. Lymphadenopathy:     He has no cervical adenopathy. Neurological: He is alert and oriented to person, place, and time. Right hand shows no muscle waisting.   Negative Tinel and Phalen test.         Assessment:      Anxiety  Testosterone Deficiency   Erectile Dysfunction  Right Arm Numbness      Plan:       EMG Right Arm  Medrol

## 2018-09-27 ENCOUNTER — NURSE ONLY (OUTPATIENT)
Dept: ENDOCRINOLOGY | Age: 43
End: 2018-09-27

## 2018-09-27 DIAGNOSIS — E34.9 TESTOSTERONE DEFICIENCY: Primary | ICD-10-CM

## 2018-09-27 RX ADMIN — TESTOSTERONE CYPIONATE 100 MG: 200 INJECTION INTRAMUSCULAR at 09:52

## 2018-09-27 NOTE — PROGRESS NOTES
Testosterone given per physician order. Patient supplied the medication. If any signs of redness, rash, swelling or unusual symptoms occur please call the office.   [address] [id number] EXP 1/2020

## 2018-09-28 DIAGNOSIS — E34.9 TESTOSTERONE DEFICIENCY: Primary | ICD-10-CM

## 2018-09-28 RX ORDER — TESTOSTERONE CYPIONATE 200 MG/ML
100 INJECTION INTRAMUSCULAR ONCE
Status: COMPLETED | OUTPATIENT
Start: 2018-10-11 | End: 2018-10-11

## 2018-10-11 ENCOUNTER — NURSE ONLY (OUTPATIENT)
Dept: ENDOCRINOLOGY | Age: 43
End: 2018-10-11
Payer: COMMERCIAL

## 2018-10-11 DIAGNOSIS — E34.9 TESTOSTERONE DEFICIENCY: Primary | ICD-10-CM

## 2018-10-11 PROCEDURE — 99999 PR OFFICE/OUTPT VISIT,PROCEDURE ONLY: CPT | Performed by: INTERNAL MEDICINE

## 2018-10-11 PROCEDURE — 96372 THER/PROPH/DIAG INJ SC/IM: CPT | Performed by: INTERNAL MEDICINE

## 2018-10-11 RX ADMIN — TESTOSTERONE CYPIONATE 100 MG: 200 INJECTION INTRAMUSCULAR at 09:48

## 2018-10-22 ENCOUNTER — HOSPITAL ENCOUNTER (OUTPATIENT)
Dept: NEUROLOGY | Age: 43
Discharge: HOME | End: 2018-10-22
Payer: COMMERCIAL

## 2018-10-22 DIAGNOSIS — R20.0 RIGHT ARM NUMBNESS: ICD-10-CM

## 2018-10-22 PROCEDURE — 95886 MUSC TEST DONE W/N TEST COMP: CPT

## 2018-10-22 PROCEDURE — 95908 NRV CNDJ TST 3-4 STUDIES: CPT

## 2018-10-22 RX ORDER — SYRINGE WITH NEEDLE, 1 ML 25GX5/8"
SYRINGE, EMPTY DISPOSABLE MISCELLANEOUS
Qty: 2 EACH | Refills: 5 | Status: SHIPPED | OUTPATIENT
Start: 2018-10-22 | End: 2018-11-02 | Stop reason: SDUPTHER

## 2018-10-22 NOTE — PROCEDURES
[address]      Electrodiagnostic Report  Test Date:  10/22/2018    Patient: Stephane Cancer :  Physician: Demario Meza D.O.   Sex: Male Height:  Ref Phys: Ivett Cordova DO      Patient Complaints:  Patient is a 46year-old male who presents with numbness and tingling right hand emmett digits 4.5 onset 2+ months ago. Patient History / Exam:  PMH: + hand surgery '08 , no endocrine disease. 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 There is underlying right carpal tunnel syndorme, moderate severity. No evidence of an acute radicullopathy or other lower motor neuron dysfunction. Thank you. NCV & EMG Findings:  Evaluation of the right median motor nerve showed prolonged distal onset latency (5.3 ms) and reduced amplitude (2.0 mV). The right ulnar motor nerve showed decreased conduction velocity (A Elbow-B Elbow, 41 m/s). The right median sensory nerve showed prolonged distal peak latency (4.0 ms). All remaining nerves (as indicated in the following tables) were within normal limits. All examined muscles (as indicated in the following table) showed no evidence of electrical instability.           Demario Meza D.O.        Nerve Conduction Studies  Anti Sensory Summary Table     Stim Site NR Peak (ms) Norm Peak (ms) P-T Amp (µV) Norm P-T Amp Site1 Site2 Delta-P (ms) Dist (cm) Esau (m/s) Norm Esau (m/s)   Right Median Anti Sensory (2nd Digit)   Wrist    4.0 <3.6 31.5 >10 Wrist 2nd Digit 4.0 14.0 35    Right Ulnar Anti Sensory (5th Digit)   Wrist    3.5 <3.7 29.8 >15.0 Wrist 5th Digit 3.5 14.0 40      Motor Summary Table     Stim Site NR Onset (ms) Norm Onset (ms) O-P Amp (mV) Norm O-P Amp Site1 Site2 Delta-0 (ms) Dist (cm) Esau (m/s) Norm Esau (m/s)   Right Median Motor (Abd Poll Brev)   Wrist    5.3 <4.3 2.0 >5 Elbow Wrist 4.5 0.0  >50 Elbow    9.8  1.3          Right Ulnar Motor (Abd Dig Minimi)   Wrist    3.4 <4.2 6.8 >3 B Elbow Wrist 3.6 25.0 69 >50   B Elbow    7.0  6.6  A Elbow B Elbow 1.7 7.0 41 >50   A Elbow    8.7  6.4  Axilla A Elbow 5.1 0.0     Axilla    13.8  0.0            EMG     Side Muscle Nerve Root Ins Act Fibs Psw Amp Dur Poly Recrt Int Tarry Golder Comment   Right Deltoid Axillary C5-6 Nml Nml Nml Nml Nml 0 Nml Nml    Right Biceps Musculocut C5-6 Nml Nml Nml Nml Nml 0 Nml Nml    Right Triceps Radial C6-7-8 Nml Nml Nml Nml Nml 0 Nml Nml    Right BrachioRad Radial C5-6 Nml Nml Nml Nml Nml 0 Nml Nml    Right PronatorTeres Median C6-7 Nml Nml Nml Nml Nml 0 Nml Nml    Right Ext Indicis Radial (Post Int) C7-8 Nml Nml Nml Nml Nml 0 Nml Nml    Right 1stDorInt Ulnar C8-T1 Nml Nml Nml Nml Nml 0 Nml Nml    Right Abd Poll Brev Median C8-T1 Nml Nml Nml Nml Nml 0 Nml Nml    Right Cervical Parasp Up Rami C1-3 Nml Nml Nml         Right Cervical Parasp Mid Rami C4-6 Nml Nml Nml         Right Cervical Parasp Low Rami C7-8 Nml Nml Nml         Electronically signed by Sharyle Mclean, DO on 10/22/2018 at 2:28 PM

## 2018-10-25 ENCOUNTER — NURSE ONLY (OUTPATIENT)
Dept: ENDOCRINOLOGY | Age: 43
End: 2018-10-25
Payer: COMMERCIAL

## 2018-10-25 DIAGNOSIS — E34.9 TESTOSTERONE DEFICIENCY: Primary | ICD-10-CM

## 2018-10-25 PROCEDURE — 96372 THER/PROPH/DIAG INJ SC/IM: CPT | Performed by: INTERNAL MEDICINE

## 2018-10-25 RX ORDER — TESTOSTERONE CYPIONATE 200 MG/ML
100 INJECTION INTRAMUSCULAR ONCE
Status: COMPLETED | OUTPATIENT
Start: 2018-10-25 | End: 2018-10-25

## 2018-10-25 RX ADMIN — TESTOSTERONE CYPIONATE 100 MG: 200 INJECTION INTRAMUSCULAR at 10:09

## 2018-10-29 DIAGNOSIS — R79.89 LOW TESTOSTERONE LEVEL IN MALE: ICD-10-CM

## 2018-10-29 LAB
HCT VFR BLD CALC: 44.6 % (ref 40.5–52.5)
HEMOGLOBIN: 15.1 G/DL (ref 13.5–17.5)
MCH RBC QN AUTO: 32.9 PG (ref 26–34)
MCHC RBC AUTO-ENTMCNC: 34 G/DL (ref 31–36)
MCV RBC AUTO: 96.9 FL (ref 80–100)
PDW BLD-RTO: 12.9 % (ref 12.4–15.4)
PLATELET # BLD: 230 K/UL (ref 135–450)
PMV BLD AUTO: 9 FL (ref 5–10.5)
RBC # BLD: 4.6 M/UL (ref 4.2–5.9)
WBC # BLD: 5 K/UL (ref 4–11)

## 2018-10-31 LAB
SEX HORMONE BINDING GLOBULIN: 29 NMOL/L (ref 11–80)
TESTOSTERONE FREE-NONMALE: 86.8 PG/ML (ref 47–244)
TESTOSTERONE TOTAL: 397 NG/DL (ref 220–1000)

## 2018-11-02 ENCOUNTER — OFFICE VISIT (OUTPATIENT)
Dept: ENDOCRINOLOGY | Age: 43
End: 2018-11-02
Payer: COMMERCIAL

## 2018-11-02 VITALS
RESPIRATION RATE: 16 BRPM | SYSTOLIC BLOOD PRESSURE: 138 MMHG | OXYGEN SATURATION: 99 % | WEIGHT: 198.4 LBS | DIASTOLIC BLOOD PRESSURE: 72 MMHG | HEART RATE: 66 BPM | HEIGHT: 72 IN | BODY MASS INDEX: 26.87 KG/M2

## 2018-11-02 DIAGNOSIS — E34.9 TESTOSTERONE DEFICIENCY: Primary | ICD-10-CM

## 2018-11-02 DIAGNOSIS — Z91.89 LACK OF MOTIVATION: ICD-10-CM

## 2018-11-02 DIAGNOSIS — R68.82 LOW LIBIDO: ICD-10-CM

## 2018-11-02 DIAGNOSIS — R53.83 OTHER FATIGUE: ICD-10-CM

## 2018-11-02 PROCEDURE — 99214 OFFICE O/P EST MOD 30 MIN: CPT | Performed by: INTERNAL MEDICINE

## 2018-11-02 RX ORDER — TESTOSTERONE CYPIONATE 200 MG/ML
INJECTION INTRAMUSCULAR
Qty: 10 ML | Refills: 0 | Status: SHIPPED | OUTPATIENT
Start: 2018-11-02 | End: 2018-11-07

## 2018-11-02 NOTE — PROGRESS NOTES
Medication Sig Dispense Refill    B-D 3CC LUER-JEANETH SYR 22GX1\" 22G X 1\" 3 ML MISC USE EVERY 2 WEEKS 2 each 5    tadalafil (CIALIS) 10 MG tablet Take 1 tablet by mouth as needed for Erectile Dysfunction 30 tablet 3    testosterone cypionate (DEPOTESTOTERONE CYPIONATE) 200 MG/ML injection 0.5ml every 2 weeks. 10 mL 0    valACYclovir (VALTREX) 1 g tablet TAKE 2 TABLETS TWICE DAILY FOR ONE DAY FOR OUTBREAKS. 4 tablet 0     No current facility-administered medications for this visit. Review of Systems  Please see scanned document dated and signed . reviewed     Objective:      /72 (Site: Left Upper Arm, Position: Sitting, Cuff Size: Medium Adult)   Pulse 66   Resp 16   Ht 6' (1.829 m)   Wt 198 lb 6.4 oz (90 kg)   SpO2 99%   BMI 26.91 kg/m²   Wt Readings from Last 3 Encounters:   11/02/18 198 lb 6.4 oz (90 kg)   09/25/18 191 lb (86.6 kg)   06/08/18 185 lb (83.9 kg)     Constitutional: Well-developed, appears stated age, cooperative, in no acute distress  H/E/N/M/T:atraumatic, normocephalic, external ears, nose, lips normal without lesions  Eyes: Lids, lashes, conjunctivae and sclerae normal, No proptosis, no redness  Neck: supple, symmetrical, no swelling  Skin: No obvious rashes or lesions present. Skin and hair texture normal  Psychiatric: Judgement and Insight:  judgement and insight appear normal  Neuro: Normal without focal findings, speech is normal normal, speech is spontaneous  Chest: No labored breathing, no chest deformity, no stridor  Musculoskeletal: No joint deformity, swelling      Lab Reviewed       Assessment: 1. Low Testosterone: Symptomatic, levels were low normal, clomid did not help, on low dose testosterone injection given previous history. Discussed side effect of replacement therapy. He felt better, symptoms improved, levels improved. He still has persistent symptoms, which may be due to other factors, will also try a higher dose  2. ED: Improved with cialis  3. Fatigue/Lack

## 2018-11-07 ENCOUNTER — OFFICE VISIT (OUTPATIENT)
Dept: ORTHOPEDIC SURGERY | Age: 43
End: 2018-11-07
Payer: COMMERCIAL

## 2018-11-07 VITALS
BODY MASS INDEX: 26.41 KG/M2 | HEIGHT: 72 IN | SYSTOLIC BLOOD PRESSURE: 131 MMHG | DIASTOLIC BLOOD PRESSURE: 84 MMHG | WEIGHT: 195 LBS

## 2018-11-07 DIAGNOSIS — G56.21 CUBITAL TUNNEL SYNDROME, RIGHT: ICD-10-CM

## 2018-11-07 DIAGNOSIS — G56.01 CARPAL TUNNEL SYNDROME, RIGHT: Primary | ICD-10-CM

## 2018-11-07 DIAGNOSIS — E34.9 TESTOSTERONE DEFICIENCY: Primary | ICD-10-CM

## 2018-11-07 PROCEDURE — 99243 OFF/OP CNSLTJ NEW/EST LOW 30: CPT | Performed by: ORTHOPAEDIC SURGERY

## 2018-11-07 RX ORDER — TESTOSTERONE CYPIONATE 200 MG/ML
150 INJECTION INTRAMUSCULAR ONCE
Status: COMPLETED | OUTPATIENT
Start: 2018-11-07 | End: 2018-11-08

## 2018-11-07 NOTE — PROGRESS NOTES
moderately severe right intrinsic muscle weakness. Deep tendon reflexes are brisk and present bilaterally. There are no subcutaneous masses or enlarged epitrochlear lymph nodes. An EMG study, dated 10/22/18 , is abnormal. There is moderate right carpal tunnel syndrome and cubital tunnel syndrome. Impression:     Carpal tunnel syndrome, right, with clinical evidence of nerve damage. Cubital tunnel syndrome, right, with clinical evidence of nerve damage. The nature of their medical problems is fully discussed with the patient, including all treatment options. Surgery is also discussed, including the possible risks, complications, prognosis and postoperative care. All questions are answered. The surgery consent form is explained and signed. Surgery will be scheduled and the patient is asked to call me if there are any additional questions. The patient understands that the surgery will be done by Dr. Keny Etienne.

## 2018-11-07 NOTE — LETTER
CONSENT TO OPERATION  AND/OR OTHER PROCEDURE(S)          PATIENT : Oriana Knight   YOB: 1975      DATE : 11/7/18          1. I request and consent that Dr. Didier Mariano. Acacia Edward,  and/or his associates or assistants perform an operation and/or procedures on the above patient at  [place] [address], to treat the condition(s) which appear indicated by the diagnostic studies already performed. I have been told that in general terms the nature, purpose and reasonable expectations of the operation and/or procedure(s) are:     Release Right Carpal Tunnel and Cubital Tunnel      2. It has been explained to me by the informing physician that during the course of the operation and/or procedure(s) unforeseen conditions may be revealed that necessitate an extension of the original operation and/or procedure(s) or different operation and/or procedures than those set forth in Paragraph 1. I therefore authorize and request that my physician and/or his associates or assistants perform such operations and/or procedures as are necessary and desirable in the exercise of professional judgment. The authority granted under this Paragraph 2 shall extend to all conditions that require treatment and are known to my physician at the time the operation is commenced. 3. I have been made aware by the informing physician of certain risks and consequences that are associated with the operation and/or procedure(s) described in Paragraph 1, the reasonable alternative methods or treatment, the possible consequences, the possibility that the operation and/or procedure(s) may be unsuccessful and the possibility of complications. I understand the reasonably known risks to be:      ? Bleeding  ? Infection  ? Poor Healing  ? Possible Damage to Nerve, Vessel, Tendon/Muscle or Bone  ? Need for further Treatment/Surgery  ? Stiffness  ? Pain  ? Residual or Recurrent Symptoms  ? Anesthetic and/or Medical Risks  ?

## 2018-11-08 ENCOUNTER — NURSE ONLY (OUTPATIENT)
Dept: ENDOCRINOLOGY | Age: 43
End: 2018-11-08
Payer: COMMERCIAL

## 2018-11-08 DIAGNOSIS — E34.9 TESTOSTERONE DEFICIENCY: Primary | ICD-10-CM

## 2018-11-08 PROCEDURE — 96372 THER/PROPH/DIAG INJ SC/IM: CPT | Performed by: INTERNAL MEDICINE

## 2018-11-08 RX ADMIN — TESTOSTERONE CYPIONATE 150 MG: 200 INJECTION INTRAMUSCULAR at 09:06

## 2018-11-26 ENCOUNTER — NURSE ONLY (OUTPATIENT)
Dept: ENDOCRINOLOGY | Age: 43
End: 2018-11-26
Payer: COMMERCIAL

## 2018-11-26 DIAGNOSIS — E34.9 TESTOSTERONE DEFICIENCY: Primary | ICD-10-CM

## 2018-11-26 PROCEDURE — 96372 THER/PROPH/DIAG INJ SC/IM: CPT | Performed by: INTERNAL MEDICINE

## 2018-11-26 RX ORDER — TESTOSTERONE CYPIONATE 200 MG/ML
150 INJECTION INTRAMUSCULAR ONCE
Status: COMPLETED | OUTPATIENT
Start: 2018-11-26 | End: 2018-11-26

## 2018-11-26 RX ADMIN — TESTOSTERONE CYPIONATE 150 MG: 200 INJECTION INTRAMUSCULAR at 09:16

## 2018-12-06 DIAGNOSIS — E34.9 TESTOSTERONE DEFICIENCY: Primary | ICD-10-CM

## 2018-12-06 RX ORDER — TESTOSTERONE CYPIONATE 200 MG/ML
150 INJECTION INTRAMUSCULAR ONCE
Status: COMPLETED | OUTPATIENT
Start: 2018-12-11 | End: 2018-12-11

## 2018-12-11 ENCOUNTER — OFFICE VISIT (OUTPATIENT)
Dept: FAMILY MEDICINE CLINIC | Age: 43
End: 2018-12-11
Payer: COMMERCIAL

## 2018-12-11 VITALS
SYSTOLIC BLOOD PRESSURE: 120 MMHG | HEIGHT: 72 IN | DIASTOLIC BLOOD PRESSURE: 80 MMHG | BODY MASS INDEX: 25.87 KG/M2 | WEIGHT: 191 LBS

## 2018-12-11 DIAGNOSIS — Z23 NEED FOR INFLUENZA VACCINATION: ICD-10-CM

## 2018-12-11 DIAGNOSIS — Z01.818 PREOPERATIVE GENERAL PHYSICAL EXAMINATION: Primary | ICD-10-CM

## 2018-12-11 DIAGNOSIS — G56.01 RIGHT CARPAL TUNNEL SYNDROME: ICD-10-CM

## 2018-12-11 PROCEDURE — 96372 THER/PROPH/DIAG INJ SC/IM: CPT | Performed by: INTERNAL MEDICINE

## 2018-12-11 PROCEDURE — 99242 OFF/OP CONSLTJ NEW/EST SF 20: CPT | Performed by: FAMILY MEDICINE

## 2018-12-11 RX ADMIN — TESTOSTERONE CYPIONATE 150 MG: 200 INJECTION INTRAMUSCULAR at 09:53

## 2018-12-11 ASSESSMENT — ENCOUNTER SYMPTOMS
CONSTIPATION: 0
SORE THROAT: 0
NAUSEA: 0
COUGH: 0
VOMITING: 0
ABDOMINAL PAIN: 0
RHINORRHEA: 1
BLOOD IN STOOL: 0
DIARRHEA: 0
WHEEZING: 0
SHORTNESS OF BREATH: 0

## 2018-12-11 NOTE — PROGRESS NOTES
Testosterone given per physician order. Patient supplied the medication. If any signs of redness, rash, swelling or unusual symptoms occur please call the office.   [facility] [id number] Lot [id number] exp 2/2020

## 2018-12-12 ENCOUNTER — NURSE ONLY (OUTPATIENT)
Dept: ENDOCRINOLOGY | Age: 43
End: 2018-12-12
Payer: COMMERCIAL

## 2018-12-12 DIAGNOSIS — E34.9 TESTOSTERONE DEFICIENCY: Primary | ICD-10-CM

## 2018-12-19 ENCOUNTER — ANESTHESIA EVENT (OUTPATIENT)
Dept: OPERATING ROOM | Age: 43
End: 2018-12-19
Payer: COMMERCIAL

## 2018-12-20 ENCOUNTER — HOSPITAL ENCOUNTER (OUTPATIENT)
Age: 43
Setting detail: OUTPATIENT SURGERY
Discharge: HOME | End: 2018-12-20
Attending: ORTHOPAEDIC SURGERY | Admitting: ORTHOPAEDIC SURGERY
Payer: COMMERCIAL

## 2018-12-20 ENCOUNTER — ANESTHESIA (OUTPATIENT)
Dept: OPERATING ROOM | Age: 43
End: 2018-12-20
Payer: COMMERCIAL

## 2018-12-20 VITALS
DIASTOLIC BLOOD PRESSURE: 78 MMHG | TEMPERATURE: 97.2 F | WEIGHT: 189.6 LBS | BODY MASS INDEX: 25.68 KG/M2 | HEIGHT: 72 IN | HEART RATE: 66 BPM | SYSTOLIC BLOOD PRESSURE: 129 MMHG | RESPIRATION RATE: 17 BRPM | OXYGEN SATURATION: 98 %

## 2018-12-20 VITALS
OXYGEN SATURATION: 99 % | RESPIRATION RATE: 10 BRPM | SYSTOLIC BLOOD PRESSURE: 108 MMHG | DIASTOLIC BLOOD PRESSURE: 61 MMHG

## 2018-12-20 PROCEDURE — 7100000001 HC PACU RECOVERY - ADDTL 15 MIN: Performed by: ORTHOPAEDIC SURGERY

## 2018-12-20 PROCEDURE — 6360000002 HC RX W HCPCS: Performed by: NURSE ANESTHETIST, CERTIFIED REGISTERED

## 2018-12-20 PROCEDURE — 7100000011 HC PHASE II RECOVERY - ADDTL 15 MIN: Performed by: ORTHOPAEDIC SURGERY

## 2018-12-20 PROCEDURE — 2500000003 HC RX 250 WO HCPCS: Performed by: ANESTHESIOLOGY

## 2018-12-20 PROCEDURE — 3600000015 HC SURGERY LEVEL 5 ADDTL 15MIN: Performed by: ORTHOPAEDIC SURGERY

## 2018-12-20 PROCEDURE — 3700000001 HC ADD 15 MINUTES (ANESTHESIA): Performed by: ORTHOPAEDIC SURGERY

## 2018-12-20 PROCEDURE — 7100000000 HC PACU RECOVERY - FIRST 15 MIN: Performed by: ORTHOPAEDIC SURGERY

## 2018-12-20 PROCEDURE — 3600000005 HC SURGERY LEVEL 5 BASE: Performed by: ORTHOPAEDIC SURGERY

## 2018-12-20 PROCEDURE — 2500000003 HC RX 250 WO HCPCS: Performed by: NURSE ANESTHETIST, CERTIFIED REGISTERED

## 2018-12-20 PROCEDURE — 3700000000 HC ANESTHESIA ATTENDED CARE: Performed by: ORTHOPAEDIC SURGERY

## 2018-12-20 PROCEDURE — 2580000003 HC RX 258: Performed by: ANESTHESIOLOGY

## 2018-12-20 PROCEDURE — 2709999900 HC NON-CHARGEABLE SUPPLY: Performed by: ORTHOPAEDIC SURGERY

## 2018-12-20 PROCEDURE — S0028 INJECTION, FAMOTIDINE, 20 MG: HCPCS | Performed by: ANESTHESIOLOGY

## 2018-12-20 PROCEDURE — 2500000003 HC RX 250 WO HCPCS: Performed by: ORTHOPAEDIC SURGERY

## 2018-12-20 PROCEDURE — 7100000010 HC PHASE II RECOVERY - FIRST 15 MIN: Performed by: ORTHOPAEDIC SURGERY

## 2018-12-20 RX ORDER — PROPOFOL 10 MG/ML
INJECTION, EMULSION INTRAVENOUS PRN
Status: DISCONTINUED | OUTPATIENT
Start: 2018-12-20 | End: 2018-12-20 | Stop reason: SDUPTHER

## 2018-12-20 RX ORDER — FENTANYL CITRATE 50 UG/ML
50 INJECTION, SOLUTION INTRAMUSCULAR; INTRAVENOUS EVERY 5 MIN PRN
Status: DISCONTINUED | OUTPATIENT
Start: 2018-12-20 | End: 2018-12-20 | Stop reason: HOSPADM

## 2018-12-20 RX ORDER — BUPIVACAINE HYDROCHLORIDE 5 MG/ML
INJECTION, SOLUTION EPIDURAL; INTRACAUDAL
Status: COMPLETED | OUTPATIENT
Start: 2018-12-20 | End: 2018-12-20

## 2018-12-20 RX ORDER — LIDOCAINE HYDROCHLORIDE 20 MG/ML
INJECTION, SOLUTION INFILTRATION; PERINEURAL PRN
Status: DISCONTINUED | OUTPATIENT
Start: 2018-12-20 | End: 2018-12-20 | Stop reason: SDUPTHER

## 2018-12-20 RX ORDER — FENTANYL CITRATE 50 UG/ML
INJECTION, SOLUTION INTRAMUSCULAR; INTRAVENOUS PRN
Status: DISCONTINUED | OUTPATIENT
Start: 2018-12-20 | End: 2018-12-20 | Stop reason: SDUPTHER

## 2018-12-20 RX ORDER — ONDANSETRON 2 MG/ML
INJECTION INTRAMUSCULAR; INTRAVENOUS PRN
Status: DISCONTINUED | OUTPATIENT
Start: 2018-12-20 | End: 2018-12-20 | Stop reason: SDUPTHER

## 2018-12-20 RX ORDER — SODIUM CHLORIDE 9 MG/ML
INJECTION, SOLUTION INTRAVENOUS CONTINUOUS
Status: DISCONTINUED | OUTPATIENT
Start: 2018-12-20 | End: 2018-12-20 | Stop reason: HOSPADM

## 2018-12-20 RX ORDER — DEXAMETHASONE SODIUM PHOSPHATE 4 MG/ML
INJECTION, SOLUTION INTRA-ARTICULAR; INTRALESIONAL; INTRAMUSCULAR; INTRAVENOUS; SOFT TISSUE PRN
Status: DISCONTINUED | OUTPATIENT
Start: 2018-12-20 | End: 2018-12-20 | Stop reason: SDUPTHER

## 2018-12-20 RX ORDER — FENTANYL CITRATE 50 UG/ML
25 INJECTION, SOLUTION INTRAMUSCULAR; INTRAVENOUS EVERY 5 MIN PRN
Status: DISCONTINUED | OUTPATIENT
Start: 2018-12-20 | End: 2018-12-20 | Stop reason: HOSPADM

## 2018-12-20 RX ORDER — SODIUM CHLORIDE 0.9 % (FLUSH) 0.9 %
10 SYRINGE (ML) INJECTION EVERY 12 HOURS SCHEDULED
Status: DISCONTINUED | OUTPATIENT
Start: 2018-12-20 | End: 2018-12-20 | Stop reason: HOSPADM

## 2018-12-20 RX ORDER — ONDANSETRON 2 MG/ML
4 INJECTION INTRAMUSCULAR; INTRAVENOUS
Status: DISCONTINUED | OUTPATIENT
Start: 2018-12-20 | End: 2018-12-20 | Stop reason: HOSPADM

## 2018-12-20 RX ORDER — SODIUM CHLORIDE 0.9 % (FLUSH) 0.9 %
10 SYRINGE (ML) INJECTION PRN
Status: DISCONTINUED | OUTPATIENT
Start: 2018-12-20 | End: 2018-12-20 | Stop reason: HOSPADM

## 2018-12-20 RX ORDER — MIDAZOLAM HYDROCHLORIDE 1 MG/ML
INJECTION INTRAMUSCULAR; INTRAVENOUS PRN
Status: DISCONTINUED | OUTPATIENT
Start: 2018-12-20 | End: 2018-12-20 | Stop reason: SDUPTHER

## 2018-12-20 RX ADMIN — SODIUM CHLORIDE: 9 INJECTION, SOLUTION INTRAVENOUS at 07:51

## 2018-12-20 RX ADMIN — FENTANYL CITRATE 50 MCG: 50 INJECTION INTRAMUSCULAR; INTRAVENOUS at 08:34

## 2018-12-20 RX ADMIN — LIDOCAINE HYDROCHLORIDE 100 MG: 20 INJECTION, SOLUTION INFILTRATION; PERINEURAL at 08:37

## 2018-12-20 RX ADMIN — FAMOTIDINE 20 MG: 10 INJECTION INTRAVENOUS at 07:51

## 2018-12-20 RX ADMIN — FENTANYL CITRATE 50 MCG: 50 INJECTION INTRAMUSCULAR; INTRAVENOUS at 08:43

## 2018-12-20 RX ADMIN — PROPOFOL 230 MG: 10 INJECTION, EMULSION INTRAVENOUS at 08:37

## 2018-12-20 RX ADMIN — DEXAMETHASONE SODIUM PHOSPHATE 8 MG: 4 INJECTION, SOLUTION INTRAMUSCULAR; INTRAVENOUS at 08:41

## 2018-12-20 RX ADMIN — ONDANSETRON 4 MG: 2 INJECTION INTRAMUSCULAR; INTRAVENOUS at 08:41

## 2018-12-20 RX ADMIN — MIDAZOLAM 2 MG: 1 INJECTION INTRAMUSCULAR; INTRAVENOUS at 08:32

## 2018-12-20 ASSESSMENT — PAIN DESCRIPTION - PAIN TYPE
TYPE: SURGICAL PAIN
TYPE: SURGICAL PAIN
TYPE: ACUTE PAIN;SURGICAL PAIN

## 2018-12-20 ASSESSMENT — PAIN DESCRIPTION - DESCRIPTORS
DESCRIPTORS: ACHING;DISCOMFORT;SORE
DESCRIPTORS: DISCOMFORT
DESCRIPTORS: DISCOMFORT

## 2018-12-20 ASSESSMENT — PULMONARY FUNCTION TESTS
PIF_VALUE: 9
PIF_VALUE: 3
PIF_VALUE: 8
PIF_VALUE: 3
PIF_VALUE: 3
PIF_VALUE: 8
PIF_VALUE: 2
PIF_VALUE: 1
PIF_VALUE: 1
PIF_VALUE: 8
PIF_VALUE: 8
PIF_VALUE: 3
PIF_VALUE: 0
PIF_VALUE: 4
PIF_VALUE: 2
PIF_VALUE: 2
PIF_VALUE: 8
PIF_VALUE: 3
PIF_VALUE: 3
PIF_VALUE: 0
PIF_VALUE: 0
PIF_VALUE: 1
PIF_VALUE: 8
PIF_VALUE: 3

## 2018-12-20 ASSESSMENT — PAIN DESCRIPTION - LOCATION
LOCATION: ARM;ELBOW
LOCATION: ELBOW
LOCATION: ELBOW

## 2018-12-20 ASSESSMENT — PAIN SCALES - GENERAL
PAINLEVEL_OUTOF10: 3
PAINLEVEL_OUTOF10: 1
PAINLEVEL_OUTOF10: 3
PAINLEVEL_OUTOF10: 1

## 2018-12-20 ASSESSMENT — PAIN DESCRIPTION - PROGRESSION
CLINICAL_PROGRESSION: NOT CHANGED
CLINICAL_PROGRESSION: GRADUALLY WORSENING

## 2018-12-20 ASSESSMENT — PAIN DESCRIPTION - FREQUENCY: FREQUENCY: CONTINUOUS

## 2018-12-20 ASSESSMENT — PAIN - FUNCTIONAL ASSESSMENT: PAIN_FUNCTIONAL_ASSESSMENT: 0-10

## 2018-12-20 ASSESSMENT — PAIN DESCRIPTION - ORIENTATION
ORIENTATION: RIGHT

## 2018-12-20 ASSESSMENT — PAIN DESCRIPTION - ONSET: ONSET: ON-GOING

## 2018-12-20 NOTE — ANESTHESIA POSTPROCEDURE EVALUATION
Department of Anesthesiology  Postprocedure Note    Patient: Elvira Watson  MRN: [id number]  YOB: 1975  Date of evaluation: 12/20/2018  Time:  4:26 PM     Procedure Summary     Date:  12/20/18 Room / Location:  [facility] OR 03 / [facility] OR    Anesthesia Start:  0150 Anesthesia Stop:  0901    Procedures:       RIGHT ULNAR NERVE DECOMPRESSION AT ELBOW (Right )      RIGHT CARPAL TUNNEL RELEASE (Right ) Diagnosis:       Carpal tunnel syndrome, right      Cubital tunnel syndrome, right      (RIGHT CUBITAL TUNNEL SYNDROME / ULNAR NERVE ENTRAPMENT AT ELBOW/ RIGHT CARPAL TUNNEL SYNDROME)    Surgeon:  Snehal Olivares MD Responsible Provider:  Antonette Alfaro MD    Anesthesia Type:  general ASA Status:  2          Anesthesia Type: general    Kelli Phase I: Kelli Score: 10    Kelli Phase II: Kelli Score: 10    Last vitals: Reviewed and per EMR flowsheets.        Anesthesia Post Evaluation    Patient location during evaluation: PACU  Patient participation: complete - patient participated  Level of consciousness: awake and alert  Pain score: 2  Airway patency: patent  Nausea & Vomiting: no nausea and no vomiting  Complications: no  Cardiovascular status: blood pressure returned to baseline  Respiratory status: acceptable  Hydration status: euvolemic

## 2018-12-20 NOTE — PROGRESS NOTES
Tolerated soda & cookies po well. Both patient & Dad expressed understanding of discharge instructions.

## 2018-12-20 NOTE — OP NOTE
OPERATIVE REPORT              . Patient:  [place]    YOB: 1975  Date of Service:  12/20/2018  Location:  [facility]      Preoperative Diagnoses:  Right  carpal tunnel syndrome & Right cubital tunnel syndrome     Postoperative Diagnoses:  Same    Procedures:   Right  Carpal Tunnel Release & Right Ulnar Nerve decompression at the Cubital Tunnel     Surgeon:    Lucille Horn. Peter Self MD    Surgical Assistant:    Ascencion Alanis PA-C    Anesthesia:   General                                   Blood Loss:    Minimal         Complications:    None                          Tourniquet Time:   6 minutes      Indications: Mr. [place] is a 44y.o.  year old male with Right  carpal tunnel syndrome & Right cubital tunnel syndrome . I have discussed preoperatively with him  the complications, limitations, expectations, alternatives and risk of the planned surgical care which he understood & all of his  questions were answered. Mr. Dallas Brooke has provided written informed consent to proceed. After written consent was obtained and the proper operative sites were identified and marked, Mr. [place] was brought to the operating room and placed in the supine position on the operating room table with the Right arm extended upon a hand table. General anesthesia was induced & the Right upper extremity was prepped and draped in the usual sterile fashion. Procedure:   After Esmarch exsanguination, the pneuomo-tourniquet was inflated to 250 millimeters of Mercury about the arm. Attention was turned to the medial elbow. A curvilinear incision was fashioned over the posterior medial aspect of the elbow centered between the medial epicondyle and the tip of the olecranon. Dissection was carried carefully through the subcutaneous tissue identifying and protecting the superficial neurovascular structures. The cubital tunnel was identified and cleared of overlying soft tissue. Careful incision allowed exposure of the ulnar nerve. The nerve was traced proximally and circumferential control of the nerve was obtained. It was dissected proximally to the level of the connection between the biceps and triceps muscles, approximately 3 cm proximal to the medial epicondyle. It was carefully mobilized from the medial intermuscular septum. It was traced distally, being completely freed along the course of the cubital tunnel, and was dissected distally to the level of the second motor branch as it split the heads of the FCU muscle. There was a tight fibrous band at the confluence of the heads of the FCU which was carefully divided. Digital palpation revealed that there were no further proximal or distal constriction about the nerve. The Ulnar Nerve was found to be stable in it's groove without tendency toward subluxation or snapping. Attention was turned to the palm. A 2 cm longitudinal incision was fashioned at the base of the palm, paralleling the longitudinal thenar crease. Dissection was carried carefully through the subcutaneous tissue identifying and protecting the neurovascular structures. The palmar fascia was incised longitudinally, exposing the transverse carpal ligament. The transverse carpal ligament was incised from its proximal to distal most extent, under direct visualization. The terminal 2 cm of antebrachial fascia was similarly incised under direct visualization. The contents of the carpal tunnel were inspected and found to be free of mass, lesion or other abnormality. Digital palpation revealed no further constriction about the median nerve. The incisions were all irrigated copiously with sterile saline for irrigation. The pneumo-tourniquet was deflated after a period of 6 minutes elevation & the fingers were immediately pink and well perfused. Hemostasis was easily obtained with direct pressure and electrocautery.   The incisions were closed in layers with

## 2018-12-20 NOTE — H&P
Pre-operative Update of H&P:    I  have seen & examined Mr. Darrell Gupta related solely to his hand and upper extremity conditions, prior to the scheduled procedure on the date of his surgery. The indications for the planned surgical procedure & and his upper-extremity conditionare unchanged. Please see the Anesthesia Pre-Op Note from date of surgery for Mr. Low Son systemic evaluation.

## 2018-12-20 NOTE — PROGRESS NOTES
VSS. IVF KVO. Awake. dsg intact right arm. Right arm elevated. Cap refill brisk, moves right fingers well, finger pink, and warm. Min pain at elbow area. Free of distress.   Electronically signed by Alexandra Narvaez RN on 12/20/2018 at 9:23 AM

## 2018-12-31 ENCOUNTER — OFFICE VISIT (OUTPATIENT)
Dept: ORTHOPEDIC SURGERY | Age: 43
End: 2018-12-31

## 2018-12-31 VITALS — BODY MASS INDEX: 25.68 KG/M2 | HEIGHT: 72 IN | WEIGHT: 189.59 LBS

## 2018-12-31 DIAGNOSIS — G56.21 CUBITAL TUNNEL SYNDROME, RIGHT: Primary | ICD-10-CM

## 2018-12-31 PROCEDURE — APPSS15 APP SPLIT SHARED TIME 0-15 MINUTES: Performed by: PHYSICIAN ASSISTANT

## 2018-12-31 PROCEDURE — 99024 POSTOP FOLLOW-UP VISIT: CPT | Performed by: PHYSICIAN ASSISTANT

## 2019-01-03 ENCOUNTER — NURSE ONLY (OUTPATIENT)
Dept: ENDOCRINOLOGY | Age: 44
End: 2019-01-03
Payer: COMMERCIAL

## 2019-01-03 DIAGNOSIS — E34.9 TESTOSTERONE DEFICIENCY: Primary | ICD-10-CM

## 2019-01-03 PROCEDURE — 96372 THER/PROPH/DIAG INJ SC/IM: CPT | Performed by: INTERNAL MEDICINE

## 2019-01-03 RX ORDER — TESTOSTERONE CYPIONATE 200 MG/ML
150 INJECTION INTRAMUSCULAR ONCE
Status: COMPLETED | OUTPATIENT
Start: 2019-01-03 | End: 2019-01-03

## 2019-01-03 RX ADMIN — TESTOSTERONE CYPIONATE 150 MG: 200 INJECTION INTRAMUSCULAR at 10:27

## 2019-01-10 DIAGNOSIS — R79.89 LOW TESTOSTERONE: ICD-10-CM

## 2019-01-11 RX ORDER — TESTOSTERONE CYPIONATE 200 MG/ML
INJECTION INTRAMUSCULAR
Qty: 2 ML | Refills: 0 | Status: SHIPPED | OUTPATIENT
Start: 2019-01-11 | End: 2019-02-23 | Stop reason: SDUPTHER

## 2019-01-17 ENCOUNTER — NURSE ONLY (OUTPATIENT)
Dept: ENDOCRINOLOGY | Age: 44
End: 2019-01-17
Payer: COMMERCIAL

## 2019-01-17 DIAGNOSIS — E34.9 TESTOSTERONE DEFICIENCY: Primary | ICD-10-CM

## 2019-01-17 PROCEDURE — 96372 THER/PROPH/DIAG INJ SC/IM: CPT | Performed by: INTERNAL MEDICINE

## 2019-01-17 RX ORDER — TESTOSTERONE CYPIONATE 200 MG/ML
150 INJECTION INTRAMUSCULAR ONCE
Status: COMPLETED | OUTPATIENT
Start: 2019-01-17 | End: 2019-01-17

## 2019-01-17 RX ADMIN — TESTOSTERONE CYPIONATE 150 MG: 200 INJECTION INTRAMUSCULAR at 10:13

## 2019-01-30 RX ORDER — TESTOSTERONE CYPIONATE 200 MG/ML
150 INJECTION INTRAMUSCULAR ONCE
Status: COMPLETED | OUTPATIENT
Start: 2019-01-31 | End: 2019-01-31

## 2019-01-31 ENCOUNTER — NURSE ONLY (OUTPATIENT)
Dept: ENDOCRINOLOGY | Age: 44
End: 2019-01-31
Payer: COMMERCIAL

## 2019-01-31 DIAGNOSIS — E34.9 TESTOSTERONE DEFICIENCY: Primary | ICD-10-CM

## 2019-01-31 PROCEDURE — 96372 THER/PROPH/DIAG INJ SC/IM: CPT | Performed by: INTERNAL MEDICINE

## 2019-01-31 RX ADMIN — TESTOSTERONE CYPIONATE 150 MG: 200 INJECTION INTRAMUSCULAR at 10:36

## 2019-02-13 DIAGNOSIS — E34.9 TESTOSTERONE DEFICIENCY: Primary | ICD-10-CM

## 2019-02-13 RX ORDER — TESTOSTERONE CYPIONATE 200 MG/ML
150 INJECTION INTRAMUSCULAR ONCE
Status: COMPLETED | OUTPATIENT
Start: 2019-02-14 | End: 2019-02-14

## 2019-02-14 ENCOUNTER — NURSE ONLY (OUTPATIENT)
Dept: ENDOCRINOLOGY | Age: 44
End: 2019-02-14
Payer: COMMERCIAL

## 2019-02-14 DIAGNOSIS — E34.9 TESTOSTERONE DEFICIENCY: Primary | ICD-10-CM

## 2019-02-14 PROCEDURE — 96372 THER/PROPH/DIAG INJ SC/IM: CPT | Performed by: INTERNAL MEDICINE

## 2019-02-14 RX ADMIN — TESTOSTERONE CYPIONATE 150 MG: 200 INJECTION INTRAMUSCULAR at 09:52

## 2019-02-23 DIAGNOSIS — R79.89 LOW TESTOSTERONE: ICD-10-CM

## 2019-02-25 RX ORDER — TESTOSTERONE CYPIONATE 200 MG/ML
INJECTION INTRAMUSCULAR
Qty: 2 ML | Refills: 0 | Status: SHIPPED | OUTPATIENT
Start: 2019-02-25 | End: 2019-04-10 | Stop reason: SDUPTHER

## 2019-02-27 ENCOUNTER — NURSE ONLY (OUTPATIENT)
Dept: ENDOCRINOLOGY | Age: 44
End: 2019-02-27
Payer: COMMERCIAL

## 2019-02-27 DIAGNOSIS — E34.9 TESTOSTERONE DEFICIENCY: Primary | ICD-10-CM

## 2019-02-27 PROCEDURE — 96372 THER/PROPH/DIAG INJ SC/IM: CPT | Performed by: INTERNAL MEDICINE

## 2019-02-27 RX ORDER — TESTOSTERONE CYPIONATE 200 MG/ML
150 INJECTION INTRAMUSCULAR ONCE
Status: COMPLETED | OUTPATIENT
Start: 2019-02-27 | End: 2019-02-27

## 2019-02-27 RX ADMIN — TESTOSTERONE CYPIONATE 150 MG: 200 INJECTION INTRAMUSCULAR at 10:15

## 2019-03-06 DIAGNOSIS — E34.9 TESTOSTERONE DEFICIENCY: ICD-10-CM

## 2019-03-06 LAB
HCT VFR BLD CALC: 43.9 % (ref 40.5–52.5)
HEMOGLOBIN: 14.9 G/DL (ref 13.5–17.5)
MCH RBC QN AUTO: 32.7 PG (ref 26–34)
MCHC RBC AUTO-ENTMCNC: 33.9 G/DL (ref 31–36)
MCV RBC AUTO: 96.4 FL (ref 80–100)
PDW BLD-RTO: 13 % (ref 12.4–15.4)
PLATELET # BLD: 233 K/UL (ref 135–450)
PMV BLD AUTO: 9.2 FL (ref 5–10.5)
RBC # BLD: 4.56 M/UL (ref 4.2–5.9)
WBC # BLD: 4 K/UL (ref 4–11)

## 2019-03-08 LAB
SEX HORMONE BINDING GLOBULIN: 36 NMOL/L (ref 11–80)
TESTOSTERONE FREE-NONMALE: 146.2 PG/ML (ref 47–244)
TESTOSTERONE TOTAL: 692 NG/DL (ref 220–1000)

## 2019-03-13 ENCOUNTER — OFFICE VISIT (OUTPATIENT)
Dept: ENDOCRINOLOGY | Age: 44
End: 2019-03-13
Payer: COMMERCIAL

## 2019-03-13 VITALS
BODY MASS INDEX: 26.14 KG/M2 | HEART RATE: 74 BPM | TEMPERATURE: 97.9 F | HEIGHT: 72 IN | WEIGHT: 193 LBS | DIASTOLIC BLOOD PRESSURE: 79 MMHG | SYSTOLIC BLOOD PRESSURE: 120 MMHG | OXYGEN SATURATION: 99 % | RESPIRATION RATE: 16 BRPM

## 2019-03-13 DIAGNOSIS — R79.89 LOW TESTOSTERONE: Primary | ICD-10-CM

## 2019-03-13 DIAGNOSIS — R53.83 OTHER FATIGUE: ICD-10-CM

## 2019-03-13 PROCEDURE — 99214 OFFICE O/P EST MOD 30 MIN: CPT | Performed by: INTERNAL MEDICINE

## 2019-03-16 RX ORDER — TESTOSTERONE CYPIONATE 200 MG/ML
150 INJECTION INTRAMUSCULAR ONCE
Status: COMPLETED | OUTPATIENT
Start: 2019-03-27 | End: 2019-03-27

## 2019-03-27 ENCOUNTER — NURSE ONLY (OUTPATIENT)
Dept: ENDOCRINOLOGY | Age: 44
End: 2019-03-27
Payer: COMMERCIAL

## 2019-03-27 DIAGNOSIS — M81.0 OSTEOPOROSIS, UNSPECIFIED OSTEOPOROSIS TYPE, UNSPECIFIED PATHOLOGICAL FRACTURE PRESENCE: Primary | ICD-10-CM

## 2019-03-27 PROCEDURE — 96372 THER/PROPH/DIAG INJ SC/IM: CPT | Performed by: INTERNAL MEDICINE

## 2019-03-27 RX ADMIN — TESTOSTERONE CYPIONATE 150 MG: 200 INJECTION INTRAMUSCULAR at 10:31

## 2019-04-10 DIAGNOSIS — R79.89 LOW TESTOSTERONE: ICD-10-CM

## 2019-04-11 ENCOUNTER — NURSE ONLY (OUTPATIENT)
Dept: ENDOCRINOLOGY | Age: 44
End: 2019-04-11
Payer: COMMERCIAL

## 2019-04-11 DIAGNOSIS — E34.9 TESTOSTERONE DEFICIENCY: ICD-10-CM

## 2019-04-11 PROCEDURE — 96372 THER/PROPH/DIAG INJ SC/IM: CPT | Performed by: INTERNAL MEDICINE

## 2019-04-11 RX ORDER — TESTOSTERONE CYPIONATE 200 MG/ML
150 INJECTION INTRAMUSCULAR ONCE
Status: COMPLETED | OUTPATIENT
Start: 2019-04-11 | End: 2019-04-11

## 2019-04-11 RX ORDER — TESTOSTERONE CYPIONATE 200 MG/ML
INJECTION INTRAMUSCULAR
Qty: 2 ML | Refills: 0 | Status: SHIPPED | OUTPATIENT
Start: 2019-04-11 | End: 2019-05-19 | Stop reason: SDUPTHER

## 2019-04-11 RX ADMIN — TESTOSTERONE CYPIONATE 150 MG: 200 INJECTION INTRAMUSCULAR at 10:18

## 2019-04-24 ENCOUNTER — NURSE ONLY (OUTPATIENT)
Dept: ENDOCRINOLOGY | Age: 44
End: 2019-04-24
Payer: COMMERCIAL

## 2019-04-24 DIAGNOSIS — E34.9 TESTOSTERONE DEFICIENCY: ICD-10-CM

## 2019-04-24 PROCEDURE — 96372 THER/PROPH/DIAG INJ SC/IM: CPT | Performed by: INTERNAL MEDICINE

## 2019-04-24 RX ORDER — TESTOSTERONE CYPIONATE 200 MG/ML
150 INJECTION INTRAMUSCULAR ONCE
Status: COMPLETED | OUTPATIENT
Start: 2019-04-24 | End: 2019-04-24

## 2019-04-24 RX ADMIN — TESTOSTERONE CYPIONATE 150 MG: 200 INJECTION INTRAMUSCULAR at 13:07

## 2019-04-24 NOTE — PROGRESS NOTES
Testosterone given per physician order. Patient supplied the medication. If any signs of redness, rash, swelling or unusual symptoms occur please call the office.   [id number] Lot [id number] ex 4/2020

## 2019-05-14 NOTE — TELEPHONE ENCOUNTER
Pharmacy told the patient that these syringes need prior authorization. He also has an appointment for 5/16 and will need these syringes for this appointment. Patient also wanted to ask about cialis. He thought the last one that he picked up were the generic ones because they looked different and he didn't think that these were working very well. He wanted to know if it might not be working because it is generic or if he needs a new dose.

## 2019-05-16 ENCOUNTER — NURSE ONLY (OUTPATIENT)
Dept: ENDOCRINOLOGY | Age: 44
End: 2019-05-16
Payer: COMMERCIAL

## 2019-05-16 DIAGNOSIS — E34.9 TESTOSTERONE DEFICIENCY: ICD-10-CM

## 2019-05-16 PROCEDURE — 96372 THER/PROPH/DIAG INJ SC/IM: CPT | Performed by: INTERNAL MEDICINE

## 2019-05-16 RX ORDER — TESTOSTERONE CYPIONATE 200 MG/ML
150 INJECTION INTRAMUSCULAR ONCE
Status: COMPLETED | OUTPATIENT
Start: 2019-05-16 | End: 2019-05-16

## 2019-05-16 RX ADMIN — TESTOSTERONE CYPIONATE 150 MG: 200 INJECTION INTRAMUSCULAR at 14:12

## 2019-05-19 DIAGNOSIS — R79.89 LOW TESTOSTERONE: ICD-10-CM

## 2019-05-19 NOTE — PROGRESS NOTES
Subjective:      Patient ID: Ignacio Dawn is a 44 y.o. male. HPI     Anxiety:  Patient continues to see his counselor occasionally. Jerrica Hendricks takes Xanax 0.5 mg TID as needed but rarely needs to take it and cuts it in half.  He feels that the medication works well when taken.      Testosterone Deficiency:  Patient sees Dr. Tony Larsen and is receiving testosterone injections every 2 weeks. His libido is fair but he continues to be poorly motivated at times. He states that all that he wants to do is sleep. He is drinking caffeine throughout the day to stay awake. He sees a Psychologist.  He previously did not tolerate anti-depressant medications.       Erectile Dysfunction:  Patient takes Cialis 10 mg daily as needed and feels that it works well when taken. He would like to change to generic Viagra. Stomach Issues:  Patient states that over his whole life, he complains of loose BM's right after he eats anything and first thing in the AM.  He takes a probiotic every day. He will generally have 3 BM's during the day. Left Heel Pain:  Patient started 10 days ago with a burning pain in the left heel. He denies any known injury but is quite active. Urinary Frequency:  Patient complains of intermittent urinary frequency. He denies dysuria or hematuria. He denies hesitancy. Review of Systems   Constitutional: Positive for fatigue. Negative for appetite change, chills and fever. Gastrointestinal: Positive for diarrhea and nausea. Negative for abdominal pain, blood in stool, constipation and vomiting. Musculoskeletal: Positive for arthralgias. Psychiatric/Behavioral: The patient is nervous/anxious. /82   Temp 98.1 °F (36.7 °C)   Ht 6' (1.829 m)   Wt 187 lb (84.8 kg)   BMI 25.36 kg/m²    Objective:   Physical Exam   Constitutional: He is oriented to person, place, and time. He appears well-developed and well-nourished. No distress. HENT:   Head: Normocephalic.    Right Ear: External ear normal.   Left Ear: External ear normal.   Mouth/Throat: Oropharynx is clear and moist.   Neck: No JVD present. Carotid bruit is not present. No thyromegaly present. Cardiovascular: Normal rate, regular rhythm and normal heart sounds. No murmur heard. Pulmonary/Chest: Effort normal and breath sounds normal. He has no wheezes. He has no rales. Abdominal: Soft. Bowel sounds are normal. He exhibits no distension and no mass. There is no tenderness. There is no rebound and no guarding. No hernia. Musculoskeletal: He exhibits no edema. Lymphadenopathy:     He has no cervical adenopathy. Neurological: He is alert and oriented to person, place, and time. Vitals reviewed. Assessment:      Anxiety  Testosterone   Erectile Dysfunction   Irritable Bowel Syndrome with Diarrhea  Left Plantar Fasciitis   Urinary Frequency  Daytime Somnolence       Plan:       UA: negative for UTI  OARRS report was reviewed  Medications refilled   I recommended Fiber supplementation  Consider Bentyl if no better with fiber. Rx Sildenafil 20 mg 1-2 daily as needed. Referral given for sleep evaluation  Advil as needed for the heel pain. RTO 3 months for Anxiety     Controlled Substances Monitoring:     RX Monitoring 5/20/2019   Attestation The Prescription Monitoring Report for this patient was reviewed today.    Chronic Pain Routine Monitoring -           KITTY MILLER DO

## 2019-05-20 ENCOUNTER — OFFICE VISIT (OUTPATIENT)
Dept: FAMILY MEDICINE CLINIC | Age: 44
End: 2019-05-20
Payer: COMMERCIAL

## 2019-05-20 VITALS
TEMPERATURE: 98.1 F | DIASTOLIC BLOOD PRESSURE: 82 MMHG | HEIGHT: 72 IN | WEIGHT: 187 LBS | SYSTOLIC BLOOD PRESSURE: 124 MMHG | BODY MASS INDEX: 25.33 KG/M2

## 2019-05-20 DIAGNOSIS — K58.0 IRRITABLE BOWEL SYNDROME WITH DIARRHEA: ICD-10-CM

## 2019-05-20 DIAGNOSIS — E34.9 TESTOSTERONE DEFICIENCY: ICD-10-CM

## 2019-05-20 DIAGNOSIS — R35.0 URINARY FREQUENCY: ICD-10-CM

## 2019-05-20 DIAGNOSIS — R40.0 DAYTIME SOMNOLENCE: ICD-10-CM

## 2019-05-20 DIAGNOSIS — M72.2 PLANTAR FASCIITIS OF LEFT FOOT: ICD-10-CM

## 2019-05-20 DIAGNOSIS — F41.9 ANXIETY: Primary | ICD-10-CM

## 2019-05-20 DIAGNOSIS — N52.9 ERECTILE DYSFUNCTION OF ORGANIC ORIGIN: ICD-10-CM

## 2019-05-20 LAB
BILIRUBIN, POC: NORMAL
BLOOD URINE, POC: NORMAL
CLARITY, POC: NORMAL
COLOR, POC: YELLOW
GLUCOSE URINE, POC: NORMAL
KETONES, POC: NORMAL
LEUKOCYTE EST, POC: NORMAL
NITRITE, POC: NORMAL
PH, POC: 6.5
PROTEIN, POC: NORMAL
SPECIFIC GRAVITY, POC: 1.02
UROBILINOGEN, POC: 0.2

## 2019-05-20 PROCEDURE — 81002 URINALYSIS NONAUTO W/O SCOPE: CPT | Performed by: FAMILY MEDICINE

## 2019-05-20 PROCEDURE — 99214 OFFICE O/P EST MOD 30 MIN: CPT | Performed by: FAMILY MEDICINE

## 2019-05-20 RX ORDER — SILDENAFIL CITRATE 20 MG/1
20-40 TABLET ORAL DAILY PRN
Qty: 20 TABLET | Refills: 5 | Status: SHIPPED | OUTPATIENT
Start: 2019-05-20 | End: 2020-06-26 | Stop reason: SDUPTHER

## 2019-05-20 RX ORDER — TESTOSTERONE CYPIONATE 200 MG/ML
INJECTION INTRAMUSCULAR
Qty: 2 ML | Refills: 0 | Status: SHIPPED | OUTPATIENT
Start: 2019-05-20 | End: 2019-06-19 | Stop reason: SDUPTHER

## 2019-05-20 ASSESSMENT — ENCOUNTER SYMPTOMS
DIARRHEA: 1
ABDOMINAL PAIN: 0
BLOOD IN STOOL: 0
VOMITING: 0
CONSTIPATION: 0
NAUSEA: 1

## 2019-05-24 RX ORDER — TESTOSTERONE CYPIONATE 200 MG/ML
150 INJECTION INTRAMUSCULAR ONCE
Status: COMPLETED | OUTPATIENT
Start: 2019-05-29 | End: 2019-05-29

## 2019-05-29 ENCOUNTER — NURSE ONLY (OUTPATIENT)
Dept: ENDOCRINOLOGY | Age: 44
End: 2019-05-29
Payer: COMMERCIAL

## 2019-05-29 DIAGNOSIS — E34.9 TESTOSTERONE DEFICIENCY: ICD-10-CM

## 2019-05-29 PROCEDURE — 96372 THER/PROPH/DIAG INJ SC/IM: CPT | Performed by: INTERNAL MEDICINE

## 2019-05-29 RX ADMIN — TESTOSTERONE CYPIONATE 150 MG: 200 INJECTION INTRAMUSCULAR at 10:01

## 2019-05-29 NOTE — PROGRESS NOTES
Testosterone given per physician order. Patient supplied the medication. If any signs of redness, rash, swelling or unusual symptoms occur please call the office.   Nitish Leblanc 47 [id number] Lot [id number] exp. 4/2020

## 2019-05-29 NOTE — PATIENT INSTRUCTIONS
Testosterone given per physician order. Patient supplied the medication. If any signs of redness, rash, swelling or unusual symptoms occur please call the office.   Nitish Leblanc 47 [id number] Lot [id number] exp. 4/2020

## 2019-06-12 ENCOUNTER — TELEPHONE (OUTPATIENT)
Dept: FAMILY MEDICINE CLINIC | Age: 44
End: 2019-06-12

## 2019-06-12 NOTE — TELEPHONE ENCOUNTER
We would not empirically use antibiotics in this situation. If he develops any sores or burning with urination or discharge, I should see him in the office.

## 2019-06-12 NOTE — TELEPHONE ENCOUNTER
Patient states he had unprotected sex last night and wants to know if MD would call in an antibiotic for him. Please return call.

## 2019-06-13 ENCOUNTER — NURSE ONLY (OUTPATIENT)
Dept: ENDOCRINOLOGY | Age: 44
End: 2019-06-13
Payer: COMMERCIAL

## 2019-06-13 DIAGNOSIS — E34.9 TESTOSTERONE DEFICIENCY: ICD-10-CM

## 2019-06-13 PROCEDURE — 96372 THER/PROPH/DIAG INJ SC/IM: CPT | Performed by: INTERNAL MEDICINE

## 2019-06-13 RX ORDER — TESTOSTERONE CYPIONATE 200 MG/ML
150 INJECTION INTRAMUSCULAR ONCE
Status: COMPLETED | OUTPATIENT
Start: 2019-06-13 | End: 2019-06-13

## 2019-06-13 RX ADMIN — TESTOSTERONE CYPIONATE 150 MG: 200 INJECTION INTRAMUSCULAR at 09:45

## 2019-06-19 DIAGNOSIS — R79.89 LOW TESTOSTERONE: ICD-10-CM

## 2019-06-19 RX ORDER — TESTOSTERONE CYPIONATE 200 MG/ML
INJECTION INTRAMUSCULAR
Qty: 2 ML | Refills: 1 | Status: SHIPPED | OUTPATIENT
Start: 2019-06-19 | End: 2019-06-27 | Stop reason: SDUPTHER

## 2019-06-27 ENCOUNTER — NURSE ONLY (OUTPATIENT)
Dept: ENDOCRINOLOGY | Age: 44
End: 2019-06-27
Payer: COMMERCIAL

## 2019-06-27 DIAGNOSIS — E34.9 TESTOSTERONE DEFICIENCY: ICD-10-CM

## 2019-06-27 DIAGNOSIS — R79.89 LOW TESTOSTERONE: ICD-10-CM

## 2019-06-27 PROCEDURE — 96372 THER/PROPH/DIAG INJ SC/IM: CPT | Performed by: INTERNAL MEDICINE

## 2019-06-27 RX ORDER — TESTOSTERONE CYPIONATE 200 MG/ML
INJECTION INTRAMUSCULAR
Qty: 2 ML | Refills: 0 | Status: SHIPPED | OUTPATIENT
Start: 2019-06-27 | End: 2019-07-30 | Stop reason: SDUPTHER

## 2019-06-27 RX ORDER — TESTOSTERONE CYPIONATE 200 MG/ML
150 INJECTION INTRAMUSCULAR ONCE
Status: COMPLETED | OUTPATIENT
Start: 2019-06-27 | End: 2019-06-27

## 2019-06-27 RX ADMIN — TESTOSTERONE CYPIONATE 150 MG: 200 INJECTION INTRAMUSCULAR at 09:45

## 2019-07-10 RX ORDER — TESTOSTERONE CYPIONATE 200 MG/ML
150 INJECTION INTRAMUSCULAR ONCE
Status: COMPLETED | OUTPATIENT
Start: 2019-07-11 | End: 2019-07-11

## 2019-07-11 ENCOUNTER — NURSE ONLY (OUTPATIENT)
Dept: ENDOCRINOLOGY | Age: 44
End: 2019-07-11
Payer: COMMERCIAL

## 2019-07-11 DIAGNOSIS — E34.9 TESTOSTERONE DEFICIENCY: ICD-10-CM

## 2019-07-11 PROCEDURE — 96372 THER/PROPH/DIAG INJ SC/IM: CPT | Performed by: INTERNAL MEDICINE

## 2019-07-11 RX ADMIN — TESTOSTERONE CYPIONATE 150 MG: 200 INJECTION INTRAMUSCULAR at 09:55

## 2019-07-11 NOTE — PROGRESS NOTES
Testosterone given per physician order. Patient supplied the medication. If any signs of redness, rash, swelling or unusual symptoms occur please call the office.   [facility] [id number] Lot [id number] Exp 1/2021

## 2019-07-23 RX ORDER — TESTOSTERONE CYPIONATE 200 MG/ML
150 INJECTION INTRAMUSCULAR ONCE
Status: COMPLETED | OUTPATIENT
Start: 2019-07-24 | End: 2019-07-25

## 2019-07-25 ENCOUNTER — NURSE ONLY (OUTPATIENT)
Dept: ENDOCRINOLOGY | Age: 44
End: 2019-07-25
Payer: COMMERCIAL

## 2019-07-25 DIAGNOSIS — E34.9 TESTOSTERONE DEFICIENCY: ICD-10-CM

## 2019-07-25 PROCEDURE — 96372 THER/PROPH/DIAG INJ SC/IM: CPT | Performed by: INTERNAL MEDICINE

## 2019-07-25 RX ADMIN — TESTOSTERONE CYPIONATE 150 MG: 200 INJECTION INTRAMUSCULAR at 09:58

## 2019-07-25 NOTE — PROGRESS NOTES
supplied by the patient.   Nitish Leblanc 47    [id number]                 Lot        [id number]           Exp.                                   .075       ml

## 2019-07-29 RX ORDER — TESTOSTERONE CYPIONATE 200 MG/ML
150 INJECTION INTRAMUSCULAR ONCE
Status: COMPLETED | OUTPATIENT
Start: 2019-08-08 | End: 2019-08-08

## 2019-07-30 DIAGNOSIS — R79.89 LOW TESTOSTERONE: ICD-10-CM

## 2019-07-30 RX ORDER — TESTOSTERONE CYPIONATE 200 MG/ML
INJECTION INTRAMUSCULAR
Qty: 2 ML | Refills: 0 | Status: SHIPPED | OUTPATIENT
Start: 2019-07-30 | End: 2019-09-02 | Stop reason: SDUPTHER

## 2019-08-05 ENCOUNTER — OFFICE VISIT (OUTPATIENT)
Dept: FAMILY MEDICINE CLINIC | Age: 44
End: 2019-08-05
Payer: COMMERCIAL

## 2019-08-05 VITALS
OXYGEN SATURATION: 98 % | TEMPERATURE: 97.8 F | DIASTOLIC BLOOD PRESSURE: 85 MMHG | BODY MASS INDEX: 25.36 KG/M2 | SYSTOLIC BLOOD PRESSURE: 136 MMHG | WEIGHT: 187 LBS | HEART RATE: 62 BPM

## 2019-08-05 DIAGNOSIS — F41.9 ANXIETY: Primary | ICD-10-CM

## 2019-08-05 PROCEDURE — 99213 OFFICE O/P EST LOW 20 MIN: CPT | Performed by: FAMILY MEDICINE

## 2019-08-05 RX ORDER — ALPRAZOLAM 0.5 MG/1
0.5 TABLET ORAL 3 TIMES DAILY PRN
Qty: 45 TABLET | Refills: 0 | Status: SHIPPED | OUTPATIENT
Start: 2019-08-05 | End: 2021-04-27 | Stop reason: SDUPTHER

## 2019-08-05 RX ORDER — ALPRAZOLAM 0.5 MG/1
0.5 TABLET ORAL NIGHTLY PRN
COMMUNITY
End: 2019-08-05 | Stop reason: SDUPTHER

## 2019-08-08 ENCOUNTER — NURSE ONLY (OUTPATIENT)
Dept: ENDOCRINOLOGY | Age: 44
End: 2019-08-08
Payer: COMMERCIAL

## 2019-08-08 DIAGNOSIS — E34.9 TESTOSTERONE DEFICIENCY: ICD-10-CM

## 2019-08-08 PROCEDURE — 96372 THER/PROPH/DIAG INJ SC/IM: CPT | Performed by: INTERNAL MEDICINE

## 2019-08-08 RX ADMIN — TESTOSTERONE CYPIONATE 150 MG: 200 INJECTION INTRAMUSCULAR at 09:45

## 2019-08-14 ENCOUNTER — OFFICE VISIT (OUTPATIENT)
Dept: SLEEP MEDICINE | Age: 44
End: 2019-08-14
Payer: COMMERCIAL

## 2019-08-14 VITALS
OXYGEN SATURATION: 97 % | HEIGHT: 72 IN | SYSTOLIC BLOOD PRESSURE: 120 MMHG | BODY MASS INDEX: 24.92 KG/M2 | TEMPERATURE: 98.1 F | WEIGHT: 184 LBS | HEART RATE: 70 BPM | DIASTOLIC BLOOD PRESSURE: 76 MMHG | RESPIRATION RATE: 16 BRPM

## 2019-08-14 DIAGNOSIS — F51.04 CHRONIC INSOMNIA: Primary | ICD-10-CM

## 2019-08-14 DIAGNOSIS — R53.83 FATIGUE, UNSPECIFIED TYPE: ICD-10-CM

## 2019-08-14 PROCEDURE — 99204 OFFICE O/P NEW MOD 45 MIN: CPT | Performed by: PSYCHIATRY & NEUROLOGY

## 2019-08-14 RX ORDER — DOXEPIN HYDROCHLORIDE 10 MG/1
10 CAPSULE ORAL NIGHTLY
Qty: 30 CAPSULE | Refills: 5 | Status: SHIPPED | OUTPATIENT
Start: 2019-08-14 | End: 2019-12-10 | Stop reason: SDUPTHER

## 2019-08-14 ASSESSMENT — ENCOUNTER SYMPTOMS
ALLERGIC/IMMUNOLOGIC NEGATIVE: 1
APNEA: 0
RESPIRATORY NEGATIVE: 1
EYES NEGATIVE: 1
CHOKING: 0
GASTROINTESTINAL NEGATIVE: 1

## 2019-08-14 ASSESSMENT — SLEEP AND FATIGUE QUESTIONNAIRES
NECK CIRCUMFERENCE (INCHES): 16
HOW LIKELY ARE YOU TO NOD OFF OR FALL ASLEEP WHILE SITTING QUIETLY AFTER LUNCH WITHOUT ALCOHOL: 0
HOW LIKELY ARE YOU TO NOD OFF OR FALL ASLEEP WHILE WATCHING TV: 2
HOW LIKELY ARE YOU TO NOD OFF OR FALL ASLEEP WHEN YOU ARE A PASSENGER IN A CAR FOR AN HOUR WITHOUT A BREAK: 0
HOW LIKELY ARE YOU TO NOD OFF OR FALL ASLEEP WHILE SITTING AND READING: 2
HOW LIKELY ARE YOU TO NOD OFF OR FALL ASLEEP WHILE SITTING INACTIVE IN A PUBLIC PLACE: 1
ESS TOTAL SCORE: 8
HOW LIKELY ARE YOU TO NOD OFF OR FALL ASLEEP WHILE SITTING AND TALKING TO SOMEONE: 0
HOW LIKELY ARE YOU TO NOD OFF OR FALL ASLEEP IN A CAR, WHILE STOPPED FOR A FEW MINUTES IN TRAFFIC: 0
HOW LIKELY ARE YOU TO NOD OFF OR FALL ASLEEP WHILE LYING DOWN TO REST IN THE AFTERNOON WHEN CIRCUMSTANCES PERMIT: 3

## 2019-08-14 NOTE — PROGRESS NOTES
MD STEVE Christian Board Certified in Sleep Medicine  Certified [facility] Sleep Medicine  Board Certified in Neurology [address]  [address] [facility] [address],  [address]  326 [facility] ([address]  1 [facility], 1200 [facility]           [address]  382 [facility] [address] [phone]    Subjective:     Patient ID: Cyndee Barraza is a 44 y.o. male. Chief Complaint   Patient presents with   [facility] Other     NP referred by Dr Philip Reina for daytime somnolence - restless sleep       HPI:        Cyndee Barraza is a 44 y.o. male referred by Dr Philip Reina for a sleep evaluation. He complains of tossing and turning, kicking, excessive daytime sleepiness, difficulty falling asleep once awakened, feels sleepy during the day, take naps during the day but he denies snoring, snorting, choking, periods of not breathing, knees buckling with laughing, completely or partially paralyzed while falling asleep or waking up, noisy environment, uncomfortable room temperature, uncomfortable bedding. Symptoms began 3 years ago, gradually worsening since that time. SLEEP SCHEDULE: Goes to bed around 9:30-10:30 PM in theweekdays and 10-11 PM in the weekends. It usually takes the patient 10-20 minutes to fall asleep. The patient gets up 0-1 per night to go to the bathroom. The Patient finally gets up at 4:30-6:60 AM during the weekdays and 4:30-6:30 AM in the weekends. patient wakes up with dry mouth and sometimes morning headache. . the headache usually dull headache lasts 30-60 minutes. The patient has restless sleep with frequent arousals in addition to the Patient has significant daytime sleepiness.  The Patient scored Total score: 8 on [place] Sleepiness Scale ( more than 10 is indicative of daytime sleepiness)and 59 in fatigue scale ( more than 36 is indicativeof daytime

## 2019-08-20 ENCOUNTER — NURSE ONLY (OUTPATIENT)
Dept: ENDOCRINOLOGY | Age: 44
End: 2019-08-20
Payer: COMMERCIAL

## 2019-08-20 DIAGNOSIS — E34.9 TESTOSTERONE DEFICIENCY: ICD-10-CM

## 2019-08-20 PROCEDURE — 96372 THER/PROPH/DIAG INJ SC/IM: CPT | Performed by: INTERNAL MEDICINE

## 2019-08-20 RX ORDER — TESTOSTERONE CYPIONATE 200 MG/ML
150 INJECTION INTRAMUSCULAR ONCE
Status: COMPLETED | OUTPATIENT
Start: 2019-08-20 | End: 2019-08-20

## 2019-08-20 RX ADMIN — TESTOSTERONE CYPIONATE 150 MG: 200 INJECTION INTRAMUSCULAR at 09:46

## 2019-09-02 DIAGNOSIS — R79.89 LOW TESTOSTERONE: ICD-10-CM

## 2019-09-03 RX ORDER — TESTOSTERONE CYPIONATE 200 MG/ML
INJECTION INTRAMUSCULAR
Qty: 2 ML | Refills: 0 | Status: SHIPPED | OUTPATIENT
Start: 2019-09-03 | End: 2019-09-19 | Stop reason: SDUPTHER

## 2019-09-04 ENCOUNTER — NURSE ONLY (OUTPATIENT)
Dept: ENDOCRINOLOGY | Age: 44
End: 2019-09-04
Payer: COMMERCIAL

## 2019-09-04 DIAGNOSIS — E34.9 TESTOSTERONE DEFICIENCY: ICD-10-CM

## 2019-09-04 PROCEDURE — 96372 THER/PROPH/DIAG INJ SC/IM: CPT | Performed by: INTERNAL MEDICINE

## 2019-09-04 RX ORDER — TESTOSTERONE CYPIONATE 200 MG/ML
150 INJECTION INTRAMUSCULAR ONCE
Status: COMPLETED | OUTPATIENT
Start: 2019-09-04 | End: 2019-09-04

## 2019-09-04 RX ADMIN — TESTOSTERONE CYPIONATE 150 MG: 200 INJECTION INTRAMUSCULAR at 10:04

## 2019-09-04 NOTE — PATIENT INSTRUCTIONS
Testosterone given per physician order. Patient supplied the medication. If any signs of redness, rash, swelling or unusual symptoms occur please call the office.   Nitish Leblanc 47 [id number] Lot [id number] Exp 9/2020

## 2019-09-11 DIAGNOSIS — R79.89 LOW TESTOSTERONE: ICD-10-CM

## 2019-09-11 LAB
HCT VFR BLD CALC: 46.1 % (ref 40.5–52.5)
HEMOGLOBIN: 15.5 G/DL (ref 13.5–17.5)
MCH RBC QN AUTO: 33.1 PG (ref 26–34)
MCHC RBC AUTO-ENTMCNC: 33.6 G/DL (ref 31–36)
MCV RBC AUTO: 98.6 FL (ref 80–100)
PDW BLD-RTO: 13.6 % (ref 12.4–15.4)
PLATELET # BLD: 246 K/UL (ref 135–450)
PMV BLD AUTO: 9.1 FL (ref 5–10.5)
RBC # BLD: 4.67 M/UL (ref 4.2–5.9)
WBC # BLD: 4.4 K/UL (ref 4–11)

## 2019-09-13 LAB
SEX HORMONE BINDING GLOBULIN: 36 NMOL/L (ref 11–80)
TESTOSTERONE FREE-NONMALE: 192.1 PG/ML (ref 47–244)
TESTOSTERONE TOTAL: 865 NG/DL (ref 220–1000)

## 2019-09-18 ENCOUNTER — OFFICE VISIT (OUTPATIENT)
Dept: ENDOCRINOLOGY | Age: 44
End: 2019-09-18
Payer: COMMERCIAL

## 2019-09-18 VITALS
WEIGHT: 184 LBS | BODY MASS INDEX: 24.92 KG/M2 | SYSTOLIC BLOOD PRESSURE: 138 MMHG | DIASTOLIC BLOOD PRESSURE: 83 MMHG | HEART RATE: 69 BPM | RESPIRATION RATE: 16 BRPM | HEIGHT: 72 IN | OXYGEN SATURATION: 98 %

## 2019-09-18 DIAGNOSIS — R79.89 LOW TESTOSTERONE: Primary | ICD-10-CM

## 2019-09-18 PROCEDURE — 99213 OFFICE O/P EST LOW 20 MIN: CPT | Performed by: INTERNAL MEDICINE

## 2019-09-19 DIAGNOSIS — R79.89 LOW TESTOSTERONE: ICD-10-CM

## 2019-09-19 RX ORDER — TESTOSTERONE CYPIONATE 200 MG/ML
150 INJECTION INTRAMUSCULAR ONCE
Status: DISCONTINUED | OUTPATIENT
Start: 2019-09-19 | End: 2021-04-27 | Stop reason: ALTCHOICE

## 2019-09-20 RX ORDER — TESTOSTERONE CYPIONATE 200 MG/ML
INJECTION INTRAMUSCULAR
Qty: 2 ML | Refills: 3 | Status: SHIPPED | OUTPATIENT
Start: 2019-09-20 | End: 2019-10-02 | Stop reason: SDUPTHER

## 2019-10-02 ENCOUNTER — NURSE ONLY (OUTPATIENT)
Dept: ENDOCRINOLOGY | Age: 44
End: 2019-10-02
Payer: COMMERCIAL

## 2019-10-02 DIAGNOSIS — R79.89 LOW TESTOSTERONE: ICD-10-CM

## 2019-10-02 DIAGNOSIS — E34.9 TESTOSTERONE DEFICIENCY: ICD-10-CM

## 2019-10-02 PROCEDURE — 96372 THER/PROPH/DIAG INJ SC/IM: CPT | Performed by: INTERNAL MEDICINE

## 2019-10-02 RX ORDER — TESTOSTERONE CYPIONATE 200 MG/ML
150 INJECTION INTRAMUSCULAR ONCE
Status: COMPLETED | OUTPATIENT
Start: 2019-10-02 | End: 2019-10-02

## 2019-10-02 RX ORDER — TESTOSTERONE CYPIONATE 200 MG/ML
INJECTION INTRAMUSCULAR
Qty: 2 ML | Refills: 3 | Status: SHIPPED | OUTPATIENT
Start: 2019-10-02 | End: 2020-02-17

## 2019-10-02 RX ADMIN — TESTOSTERONE CYPIONATE 150 MG: 200 INJECTION INTRAMUSCULAR at 09:49

## 2019-10-15 RX ORDER — TESTOSTERONE CYPIONATE 200 MG/ML
150 INJECTION INTRAMUSCULAR ONCE
Status: COMPLETED | OUTPATIENT
Start: 2019-10-17 | End: 2019-10-17

## 2019-10-17 ENCOUNTER — NURSE ONLY (OUTPATIENT)
Dept: ENDOCRINOLOGY | Age: 44
End: 2019-10-17
Payer: COMMERCIAL

## 2019-10-17 DIAGNOSIS — E34.9 TESTOSTERONE DEFICIENCY: ICD-10-CM

## 2019-10-17 PROCEDURE — 96372 THER/PROPH/DIAG INJ SC/IM: CPT | Performed by: INTERNAL MEDICINE

## 2019-10-17 RX ADMIN — TESTOSTERONE CYPIONATE 150 MG: 200 INJECTION INTRAMUSCULAR at 10:01

## 2019-10-25 RX ORDER — TESTOSTERONE CYPIONATE 200 MG/ML
150 INJECTION INTRAMUSCULAR ONCE
Status: COMPLETED | OUTPATIENT
Start: 2019-11-01 | End: 2019-11-01

## 2019-11-01 ENCOUNTER — NURSE ONLY (OUTPATIENT)
Dept: ENDOCRINOLOGY | Age: 44
End: 2019-11-01
Payer: COMMERCIAL

## 2019-11-01 DIAGNOSIS — M81.0 OSTEOPOROSIS, UNSPECIFIED OSTEOPOROSIS TYPE, UNSPECIFIED PATHOLOGICAL FRACTURE PRESENCE: Primary | ICD-10-CM

## 2019-11-01 PROCEDURE — 96372 THER/PROPH/DIAG INJ SC/IM: CPT | Performed by: INTERNAL MEDICINE

## 2019-11-01 RX ADMIN — TESTOSTERONE CYPIONATE 150 MG: 200 INJECTION INTRAMUSCULAR at 10:08

## 2019-11-13 RX ORDER — TESTOSTERONE CYPIONATE 200 MG/ML
150 INJECTION INTRAMUSCULAR ONCE
Status: COMPLETED | OUTPATIENT
Start: 2019-11-14 | End: 2019-11-14

## 2019-11-14 ENCOUNTER — NURSE ONLY (OUTPATIENT)
Dept: ENDOCRINOLOGY | Age: 44
End: 2019-11-14
Payer: COMMERCIAL

## 2019-11-14 DIAGNOSIS — E34.9 TESTOSTERONE DEFICIENCY: ICD-10-CM

## 2019-11-14 PROCEDURE — 96372 THER/PROPH/DIAG INJ SC/IM: CPT | Performed by: INTERNAL MEDICINE

## 2019-11-14 RX ADMIN — TESTOSTERONE CYPIONATE 150 MG: 200 INJECTION INTRAMUSCULAR at 09:59

## 2019-11-15 RX ORDER — TESTOSTERONE CYPIONATE 200 MG/ML
150 INJECTION INTRAMUSCULAR ONCE
Status: COMPLETED | OUTPATIENT
Start: 2019-11-27 | End: 2020-01-10

## 2019-11-27 ENCOUNTER — PROCEDURE VISIT (OUTPATIENT)
Dept: ENDOCRINOLOGY | Age: 44
End: 2019-11-27

## 2019-11-27 DIAGNOSIS — R79.89 LOW TESTOSTERONE: Primary | ICD-10-CM

## 2019-12-10 DIAGNOSIS — F51.04 CHRONIC INSOMNIA: ICD-10-CM

## 2019-12-10 RX ORDER — DOXEPIN HYDROCHLORIDE 10 MG/1
CAPSULE ORAL
Qty: 90 CAPSULE | Refills: 1 | Status: SHIPPED | OUTPATIENT
Start: 2019-12-10 | End: 2020-02-19

## 2019-12-12 ENCOUNTER — NURSE ONLY (OUTPATIENT)
Dept: ENDOCRINOLOGY | Age: 44
End: 2019-12-12
Payer: COMMERCIAL

## 2019-12-12 DIAGNOSIS — E34.9 TESTOSTERONE DEFICIENCY: ICD-10-CM

## 2019-12-12 PROCEDURE — 96372 THER/PROPH/DIAG INJ SC/IM: CPT | Performed by: INTERNAL MEDICINE

## 2019-12-12 RX ORDER — TESTOSTERONE CYPIONATE 200 MG/ML
150 INJECTION INTRAMUSCULAR ONCE
Status: COMPLETED | OUTPATIENT
Start: 2019-12-12 | End: 2019-12-12

## 2019-12-12 RX ADMIN — TESTOSTERONE CYPIONATE 150 MG: 200 INJECTION INTRAMUSCULAR at 10:19

## 2019-12-19 ENCOUNTER — OFFICE VISIT (OUTPATIENT)
Dept: FAMILY MEDICINE CLINIC | Age: 44
End: 2019-12-19
Payer: COMMERCIAL

## 2019-12-19 VITALS
HEIGHT: 72 IN | DIASTOLIC BLOOD PRESSURE: 80 MMHG | WEIGHT: 191 LBS | BODY MASS INDEX: 25.87 KG/M2 | SYSTOLIC BLOOD PRESSURE: 120 MMHG

## 2019-12-19 DIAGNOSIS — F41.9 ANXIETY: Primary | ICD-10-CM

## 2019-12-19 DIAGNOSIS — Z12.83 SKIN EXAM FOR MALIGNANT NEOPLASM: ICD-10-CM

## 2019-12-19 DIAGNOSIS — D22.9 FIBROUS PAPULE OF SKIN: ICD-10-CM

## 2019-12-19 DIAGNOSIS — Z23 NEED FOR INFLUENZA VACCINATION: ICD-10-CM

## 2019-12-19 PROCEDURE — 99213 OFFICE O/P EST LOW 20 MIN: CPT | Performed by: FAMILY MEDICINE

## 2019-12-19 RX ORDER — TRIAMCINOLONE ACETONIDE 1 MG/G
CREAM TOPICAL
Qty: 15 G | Refills: 1 | Status: SHIPPED | OUTPATIENT
Start: 2019-12-19 | End: 2021-03-01

## 2019-12-26 RX ORDER — TESTOSTERONE CYPIONATE 200 MG/ML
150 INJECTION INTRAMUSCULAR ONCE
Status: COMPLETED | OUTPATIENT
Start: 2019-12-27 | End: 2019-12-27

## 2019-12-26 RX ORDER — TESTOSTERONE CYPIONATE 200 MG/ML
200 INJECTION INTRAMUSCULAR ONCE
Status: CANCELLED | OUTPATIENT
Start: 2019-12-27

## 2019-12-27 ENCOUNTER — NURSE ONLY (OUTPATIENT)
Dept: ENDOCRINOLOGY | Age: 44
End: 2019-12-27
Payer: COMMERCIAL

## 2019-12-27 DIAGNOSIS — E34.9 TESTOSTERONE DEFICIENCY: ICD-10-CM

## 2019-12-27 PROCEDURE — 96372 THER/PROPH/DIAG INJ SC/IM: CPT | Performed by: INTERNAL MEDICINE

## 2019-12-27 RX ADMIN — TESTOSTERONE CYPIONATE 150 MG: 200 INJECTION INTRAMUSCULAR at 10:12

## 2020-01-10 ENCOUNTER — NURSE ONLY (OUTPATIENT)
Dept: ENDOCRINOLOGY | Age: 45
End: 2020-01-10
Payer: COMMERCIAL

## 2020-01-10 PROCEDURE — 96372 THER/PROPH/DIAG INJ SC/IM: CPT | Performed by: INTERNAL MEDICINE

## 2020-01-10 RX ADMIN — TESTOSTERONE CYPIONATE 150 MG: 200 INJECTION INTRAMUSCULAR at 10:03

## 2020-01-23 ENCOUNTER — NURSE ONLY (OUTPATIENT)
Dept: ENDOCRINOLOGY | Age: 45
End: 2020-01-23
Payer: COMMERCIAL

## 2020-01-23 PROCEDURE — 96372 THER/PROPH/DIAG INJ SC/IM: CPT | Performed by: INTERNAL MEDICINE

## 2020-01-23 RX ORDER — TESTOSTERONE CYPIONATE 200 MG/ML
150 INJECTION INTRAMUSCULAR ONCE
Status: COMPLETED | OUTPATIENT
Start: 2020-01-23 | End: 2020-01-23

## 2020-01-23 RX ADMIN — TESTOSTERONE CYPIONATE 150 MG: 200 INJECTION INTRAMUSCULAR at 10:09

## 2020-01-23 NOTE — PROGRESS NOTES
Testosterone given per physician order. Patient supplied the medication. If any signs of redness, rash, swelling or unusual symptoms occur please call the office.   Nitish Leblanc 47 [id number] Lot [id number] exp. 5/2021

## 2020-02-05 ENCOUNTER — NURSE ONLY (OUTPATIENT)
Dept: ENDOCRINOLOGY | Age: 45
End: 2020-02-05
Payer: COMMERCIAL

## 2020-02-05 PROCEDURE — 96372 THER/PROPH/DIAG INJ SC/IM: CPT | Performed by: INTERNAL MEDICINE

## 2020-02-05 RX ORDER — TESTOSTERONE CYPIONATE 200 MG/ML
150 INJECTION INTRAMUSCULAR ONCE
Status: COMPLETED | OUTPATIENT
Start: 2020-02-05 | End: 2020-02-05

## 2020-02-05 RX ADMIN — TESTOSTERONE CYPIONATE 150 MG: 200 INJECTION INTRAMUSCULAR at 10:20

## 2020-02-12 DIAGNOSIS — R79.89 LOW TESTOSTERONE: ICD-10-CM

## 2020-02-12 LAB
HCT VFR BLD CALC: 46.2 % (ref 40.5–52.5)
HEMOGLOBIN: 16 G/DL (ref 13.5–17.5)
MCH RBC QN AUTO: 33.7 PG (ref 26–34)
MCHC RBC AUTO-ENTMCNC: 34.6 G/DL (ref 31–36)
MCV RBC AUTO: 97.4 FL (ref 80–100)
PDW BLD-RTO: 12.9 % (ref 12.4–15.4)
PLATELET # BLD: 234 K/UL (ref 135–450)
PMV BLD AUTO: 8.5 FL (ref 5–10.5)
RBC # BLD: 4.75 M/UL (ref 4.2–5.9)
WBC # BLD: 5.2 K/UL (ref 4–11)

## 2020-02-14 LAB
SEX HORMONE BINDING GLOBULIN: 29 NMOL/L (ref 11–80)
TESTOSTERONE FREE-NONMALE: 219.6 PG/ML (ref 47–244)
TESTOSTERONE TOTAL: 876 NG/DL (ref 220–1000)

## 2020-02-17 RX ORDER — TESTOSTERONE CYPIONATE 200 MG/ML
INJECTION INTRAMUSCULAR
Qty: 2 ML | Refills: 3 | Status: SHIPPED | OUTPATIENT
Start: 2020-02-17 | End: 2021-03-01

## 2020-02-19 ENCOUNTER — OFFICE VISIT (OUTPATIENT)
Dept: ENDOCRINOLOGY | Age: 45
End: 2020-02-19
Payer: COMMERCIAL

## 2020-02-19 VITALS
DIASTOLIC BLOOD PRESSURE: 76 MMHG | SYSTOLIC BLOOD PRESSURE: 121 MMHG | WEIGHT: 188.6 LBS | BODY MASS INDEX: 25.55 KG/M2 | HEART RATE: 67 BPM | OXYGEN SATURATION: 98 % | HEIGHT: 72 IN

## 2020-02-19 PROCEDURE — 99213 OFFICE O/P EST LOW 20 MIN: CPT | Performed by: INTERNAL MEDICINE

## 2020-02-19 PROCEDURE — 96372 THER/PROPH/DIAG INJ SC/IM: CPT | Performed by: INTERNAL MEDICINE

## 2020-02-19 RX ORDER — TESTOSTERONE CYPIONATE 200 MG/ML
150 INJECTION INTRAMUSCULAR ONCE
Status: COMPLETED | OUTPATIENT
Start: 2020-02-19 | End: 2020-02-19

## 2020-02-19 RX ADMIN — TESTOSTERONE CYPIONATE 150 MG: 200 INJECTION INTRAMUSCULAR at 09:54

## 2020-02-19 NOTE — PROGRESS NOTES
Testosterone given per physician order. Patient supplied the medication. If any signs of redness, rash, swelling or unusual symptoms occur please call the office.   Ul[facility]cho Leblanc 47 [id number] [id number] exp 7/2021

## 2020-02-19 NOTE — PROGRESS NOTES
RIGHT CARPAL TUNNEL RELEASE performed by Edyta Hong MD at [address] [place] [address]  2008    ELBOW FRACTURE SURGERY Left Age 6 or 7    HAND SURGERY Right 2010    Thumb ORIF    PENIS SURGERY      Condyloma destruction with laser.  CO REPAIR MAJOR PERIPHERAL NERVE Right 12/20/2018    RIGHT ULNAR NERVE DECOMPRESSION AT ELBOW performed by Edyta Hong MD at [facility]    Right    WRIST SURGERY  2009    cyst removed    WRIST SURGERY Left 2012    Cyst removal     Current Outpatient Medications   Medication Sig Dispense Refill    testosterone cypionate (DEPOTESTOTERONE CYPIONATE) 200 MG/ML injection INJECT 0.75 EVERY 2 WEEKS AS DIRECTED 2 mL 3    triamcinolone (KENALOG) 0.1 % cream Apply topically 2 times daily. 15 g 1    SYRINGE-NEEDLE, DISP, 3 ML (B-D 3CC LUER-JEANETH SYR 22GX1\") 22G X 1\" 3 ML MISC USE EVERY 2 WEEKS 2 each 5    sildenafil (REVATIO) 20 MG tablet Take 1-2 tablets by mouth daily as needed (erectile dysfunction) 20 tablet 5    Probiotic Product (PROBIOTIC DAILY PO) Take 1 tablet by mouth daily       Current Facility-Administered Medications   Medication Dose Route Frequency Provider Last Rate Last Dose    testosterone cypionate (DEPOTESTOTERONE CYPIONATE) injection 150 mg  150 mg Intramuscular Once Cal Huertas MD           Review of Systems  Please see scanned document dated and signed .  reviewed     Objective:      /76 (Site: Left Upper Arm, Position: Sitting, Cuff Size: Large Adult)   Pulse 67   Ht 6' (1.829 m)   Wt 188 lb 9.6 oz (85.5 kg)   SpO2 98%   BMI 25.58 kg/m²   Wt Readings from Last 3 Encounters:   02/19/20 188 lb 9.6 oz (85.5 kg)   12/19/19 191 lb (86.6 kg)   09/18/19 184 lb (83.5 kg)     Constitutional: Well-developed, appears stated age, cooperative, in no acute distress  H/E/N/M/T:atraumatic, normocephalic, external ears, nose, lips normal without lesions  Eyes: Lids, lashes, conjunctivae and sclerae normal, No proptosis, no redness  Neck: supple, symmetrical, no swelling  Skin: No obvious rashes or lesions present. Skin and hair texture normal  Psychiatric: Judgement and Insight:  judgement and insight appear normal  Neuro: Normal without focal findings, speech is normal normal, speech is spontaneous  Chest: No labored breathing, no chest deformity, no stridor  Musculoskeletal: No joint deformity, swelling      Lab Reviewed       Assessment: 1. Low Testosterone: Symptomatic, levels were low normal, clomid did not help, on low dose testosterone injection given previous history. Discussed side effect of replacement therapy. He felt better, symptoms improved, levels improved. Discussed if plans to continue or hold depending on symptoms, he will hold and reassess  2. ED: Improved with cialis  3. Fatigue/Lack of motivation:  higher dose of testosterone did not help, advised unlikely cause. Relaxation techniques, sleep hygiene, meditation advised. Advised sleep evaluation  He saw therapist    Plan:     1. Testosterone 150mg every 2 weeks- Hold , last dose now  2. CBC, testosterone in 8 weeks off treatment

## 2020-04-06 ENCOUNTER — VIRTUAL VISIT (OUTPATIENT)
Dept: FAMILY MEDICINE CLINIC | Age: 45
End: 2020-04-06
Payer: COMMERCIAL

## 2020-04-06 PROCEDURE — 99213 OFFICE O/P EST LOW 20 MIN: CPT | Performed by: FAMILY MEDICINE

## 2020-04-06 RX ORDER — GUAIFENESIN AND PSEUDOEPHEDRINE HCL 1200; 120 MG/1; MG/1
1 TABLET, EXTENDED RELEASE ORAL 2 TIMES DAILY PRN
Qty: 20 TABLET | Refills: 0 | Status: SHIPPED | OUTPATIENT
Start: 2020-04-06 | End: 2021-03-01

## 2020-04-06 RX ORDER — AZITHROMYCIN 250 MG/1
250 TABLET, FILM COATED ORAL SEE ADMIN INSTRUCTIONS
Qty: 6 TABLET | Refills: 0 | Status: SHIPPED | OUTPATIENT
Start: 2020-04-06 | End: 2020-04-11

## 2020-04-06 ASSESSMENT — ENCOUNTER SYMPTOMS
SINUS PRESSURE: 0
SINUS PAIN: 0
RHINORRHEA: 1
SHORTNESS OF BREATH: 0
SORE THROAT: 0
COUGH: 1
WHEEZING: 0

## 2020-06-02 ENCOUNTER — TELEPHONE (OUTPATIENT)
Dept: FAMILY MEDICINE CLINIC | Age: 45
End: 2020-06-02

## 2020-06-02 RX ORDER — PANTOPRAZOLE SODIUM 40 MG/1
40 TABLET, DELAYED RELEASE ORAL
Qty: 30 TABLET | Refills: 0 | Status: SHIPPED | OUTPATIENT
Start: 2020-06-02 | End: 2020-06-24

## 2020-06-24 RX ORDER — PANTOPRAZOLE SODIUM 40 MG/1
TABLET, DELAYED RELEASE ORAL
Qty: 30 TABLET | Refills: 0 | Status: SHIPPED | OUTPATIENT
Start: 2020-06-24 | End: 2020-07-21

## 2020-06-26 RX ORDER — SILDENAFIL CITRATE 20 MG/1
20-40 TABLET ORAL DAILY PRN
Qty: 20 TABLET | Refills: 0 | Status: SHIPPED | OUTPATIENT
Start: 2020-06-26 | End: 2020-11-11 | Stop reason: SDUPTHER

## 2020-07-17 ENCOUNTER — TELEPHONE (OUTPATIENT)
Dept: ENDOCRINOLOGY | Age: 45
End: 2020-07-17

## 2020-07-21 DIAGNOSIS — R79.89 LOW TESTOSTERONE: ICD-10-CM

## 2020-07-21 LAB
HCT VFR BLD CALC: 44.6 % (ref 40.5–52.5)
HEMOGLOBIN: 15.1 G/DL (ref 13.5–17.5)
MCH RBC QN AUTO: 33.4 PG (ref 26–34)
MCHC RBC AUTO-ENTMCNC: 33.9 G/DL (ref 31–36)
MCV RBC AUTO: 98.5 FL (ref 80–100)
PDW BLD-RTO: 13 % (ref 12.4–15.4)
PLATELET # BLD: 257 K/UL (ref 135–450)
PMV BLD AUTO: 9.6 FL (ref 5–10.5)
RBC # BLD: 4.52 M/UL (ref 4.2–5.9)
WBC # BLD: 5.4 K/UL (ref 4–11)

## 2020-07-23 LAB
SEX HORMONE BINDING GLOBULIN: 37 NMOL/L (ref 11–80)
TESTOSTERONE FREE-NONMALE: 84.1 PG/ML (ref 47–244)
TESTOSTERONE TOTAL: 438 NG/DL (ref 220–1000)

## 2020-07-27 ENCOUNTER — VIRTUAL VISIT (OUTPATIENT)
Dept: ENDOCRINOLOGY | Age: 45
End: 2020-07-27
Payer: COMMERCIAL

## 2020-07-27 PROCEDURE — 99213 OFFICE O/P EST LOW 20 MIN: CPT | Performed by: INTERNAL MEDICINE

## 2020-07-27 NOTE — PROGRESS NOTES
45 Y/o WM seen for evaluation of hypogonadism        Pursuant to the emergency declaration under the [facility] waiver authority and the [employer] Act, this Virtual  Visit was conducted, with patient's consent, to reduce the patient's risk of exposure to COVID-19 and provide continuity of care for an established patient. Patient was at home. Provider was at home. No one else was involved  Services were provided through a video synchronous discussion virtually to substitute for in-person clinic visit. Interim:      2/20 testosterone stopped    He has a h/o low testosterone for 2 years. Was seeing Dr. SEGOVIA [facility] [employer]. He retired. Does not remember the cause for low testosterone    Mild, stable, controlled    He was getting testosterone injections weekly. felt a lot better  Main symptoms : Fatigue, lack of motivation, reduced libido, ED    Level: 588 on 12/14    Treatment was stopped 5/17 as MD retired, symptoms came back    He denies any change in shaving frequency, no change in testicular or penile size. No gynecomastia, denies any breast discharge. There is no previous h/o mumps, no h/u use of narcotics or anabolic steroids. No h/o iron disorders in the family. Children : None  - does not plan for children    No h/o chemotherapy, radiation therapy, excessive alcohol. No h/o anosmia    7/17 Testosterone 390 LH/FSH/ Prolactin, B12/Vitamin D nl  Discussed with patient, level is normal so would not recommend replacement. Will repeat one more. He does have ED, start cialis    9/17 Testosterone 377   trial of clomid,25mg three times a week, did not feel better.     4/18  Testosterone 800 HCT  42  Peak level  On 100mg every 2 weeks  10/18 Testosterone 397  4 days after injection  3/19 Testosterone 692  HCT 43.9 mid cycle  level   150mg every 2 weeks  9/19 Testosterone 865   Mid cycle  2/20 Testosterone 876  Trough level    7/20  Testosterone 438    Past Medical History:   Diagnosis Date    Anxiety     Carpal tunnel syndrome, right 11/7/2018    Chronic seasonal allergic rhinitis due to pollen     Condyloma acuminata     Erectile dysfunction of organic origin     Herpes dermatitis     Nostril    Irritable bowel syndrome with diarrhea     Major depression single episode, in partial remission ([address]      no meds    Prolonged emergence from general anesthesia     family hx-father    Testosterone deficiency      Past Surgical History:   Procedure Laterality Date    CARPAL TUNNEL RELEASE Right 12/20/2018    RIGHT CARPAL TUNNEL RELEASE performed by Caryn Julio MD at [address] [place] [address]  2008   [address] Left Age 6 or 7    HAND SURGERY Right 2010    Thumb ORIF    PENIS SURGERY      Condyloma destruction with laser.  [place] REPAIR MAJOR PERIPHERAL NERVE Right 12/20/2018    RIGHT ULNAR NERVE DECOMPRESSION AT ELBOW performed by Caryn Julio MD at [facility]    Right    WRIST SURGERY  2009    cyst removed    WRIST SURGERY Left 2012    Cyst removal     Current Outpatient Medications   Medication Sig Dispense Refill    pantoprazole (PROTONIX) 40 MG tablet TAKE 1 TABLET BY MOUTH EVERY DAY BEFORE BREAKFAST 30 tablet 2    sildenafil (REVATIO) 20 MG tablet Take 1-2 tablets by mouth daily as needed (erectile dysfunction) 20 tablet 0    Pseudoephedrine-guaiFENesin 120-1200 MG TB12 Take 1 tablet by mouth 2 times daily as needed (Congestion) 20 tablet 0    testosterone cypionate (DEPOTESTOTERONE CYPIONATE) 200 MG/ML injection INJECT 0.75 EVERY 2 WEEKS AS DIRECTED 2 mL 3    triamcinolone (KENALOG) 0.1 % cream Apply topically 2 times daily.  15 g 1    SYRINGE-NEEDLE, DISP, 3 ML (B-D 3CC LUER-JEANETH SYR 22GX1\") 22G X 1\" 3 ML MISC USE EVERY 2 WEEKS 2 each 5    Probiotic Product (PROBIOTIC DAILY PO) Take 1 tablet by mouth daily       Current Facility-Administered Medications Medication Dose Route Frequency Provider Last Rate Last Dose    testosterone cypionate (DEPOTESTOTERONE CYPIONATE) injection 150 mg  150 mg Intramuscular Once Roxanna Hester MD           Review of Systems  Constitutional: Negative for weight loss and malaise/fatigue. Negative for fever and chills. HENT: Negative for hearing loss, ear pain, nosebleeds, neck pain and tinnitus. Eyes: Negative for blurred vision. Negative for double vision, photophobia and pain. Respiratory: Negative for cough and sputum production. Cardiovascular: Negative for chest pain, palpitations and leg swelling. Gastrointestinal: Negative for nausea, vomiting and abdominal pain. Genitourinary: Negative for dysuria, urgency and frequency. Musculoskeletal: Negative for back pain. No joint pain  Skin: Negative for itching and rash. Neurological: Negative for dizziness. Negative for tingling, tremors, focal weakness and headaches. Endo/Heme/Allergies: see HPI  Psychiatric/Behavioral: Negative for depression and substance abuse. PHYSICAL EXAMINATION:  [ INSTRUCTIONS:  \"[x]\" Indicates a positive item  \"[]\" Indicates a negative item  -- DELETE ALL ITEMS NOT EXAMINED]  Vital Signs: (As obtained by patient/caregiver or practitioner observation)    Blood pressure-  Heart rate-    Respiratory rate-    Temperature-  Pulse oximetry-     Constitutional Appears well-developed and well-nourished No apparent distress        Mental status  Alert and awake  Oriented to person/place/time  Able to follow commands      Eyes:  EOM    [x]  Normal    Sclera  [x]  Normal           Discharge [x]  None visible      HENT:   [x] Normocephalic, atraumatic.     [x] Mouth/Throat: Mucous membranes are moist.     External Ears [x] Normal  no discharge    Neck: [x] No visualized mass  no swelling    Pulmonary/Chest: [x] Respiratory effort normal.  [x] No visualized signs of difficulty breathing or respiratory distress             Musculoskeletal: [x] Normal gait with no signs of ataxia         [x] Normal range of motion of neck          Head and neck stable, appears normal ROM, strength good    Neurological:        [x] No Facial Asymmetry (Cranial nerve 7 motor function) (limited exam to video visit)          [x] No gaze palsy                Skin:        [x] No significant exanthematous lesions or discoloration noted on facial skin                 Psychiatric:       [x] Normal Affect [x] No Hallucinations            Other pertinent observable physical exam findings-     Due to this being a TeleHealth encounter, evaluation of the following organ systems is limited: Vitals/Constitutional/EENT/Resp/CV/GI//MS/Neuro/Skin/Heme-Lymph-Imm. Services were provided through a video synchronous discussion virtually to substitute for in-person clinic visit. Lab Reviewed       Assessment: 1. Low Testosterone: Symptomatic, levels were low normal, clomid did not help, on low dose testosterone injection given previous history. Discussed side effect of replacement therapy. He felt better, symptoms improved, levels improved. Discussed if plans to continue or hold depending on symptoms, held. Level normal, report lack of motivation, advised unlikely due to low testosterone, continue to hold  2. ED: Improved with cialis  3. Fatigue/Lack of motivation:  higher dose of testosterone did not help, advised unlikely cause. Relaxation techniques, sleep hygiene, meditation advised. Advised sleep evaluation  He saw therapist    Plan: 1. Hold testosterone  2. Monitor levels

## 2020-09-29 ENCOUNTER — TELEPHONE (OUTPATIENT)
Dept: FAMILY MEDICINE CLINIC | Age: 45
End: 2020-09-29

## 2020-09-29 NOTE — TELEPHONE ENCOUNTER
----- Message from Jacinto Reilly sent at 9/29/2020 11:33 AM EDT -----  Subject: Message to Provider    QUESTIONS  Information for Provider? Pt called to get blood type and to see if the   office conducted ant body testing   please return call  ---------------------------------------------------------------------------  --------------  [address]  What is the best way for the office to contact you? OK to leave message on   voicemail  Preferred Call Back Phone Number? [phone]  ---------------------------------------------------------------------------  --------------  SCRIPT ANSWERS  Relationship to Patient?  Self

## 2020-09-29 NOTE — TELEPHONE ENCOUNTER
Blood typing is generally only done when giving or receiving blood. It is not something that is necessary otherwise. I do not have his blood type or antibodies.

## 2020-10-13 DIAGNOSIS — Z20.822 EXPOSURE TO COVID-19 VIRUS: ICD-10-CM

## 2020-10-13 DIAGNOSIS — R79.89 LOW TESTOSTERONE: ICD-10-CM

## 2020-10-13 LAB — SARS-COV-2 ANTIBODY, TOTAL: NEGATIVE

## 2020-10-15 LAB
SEX HORMONE BINDING GLOBULIN: 29 NMOL/L (ref 11–80)
TESTOSTERONE FREE-NONMALE: 92.5 PG/ML (ref 47–244)
TESTOSTERONE TOTAL: 420 NG/DL (ref 220–1000)

## 2020-10-19 ENCOUNTER — TELEPHONE (OUTPATIENT)
Dept: FAMILY MEDICINE CLINIC | Age: 45
End: 2020-10-19

## 2020-10-19 NOTE — TELEPHONE ENCOUNTER
----- Message from [facility] sent at 10/19/2020  1:38 PM EDT -----  Subject: Results Request    QUESTIONS  Which lab or imaging result is the patient calling about? blood work   Which provider ordered the test? Pedro Pablo Rubi   At what location was the test performed? [facility]  Date the test was performed? 2020-10-13  Additional Information for Provider? Patient is calling to get the results   of this blood test  ---------------------------------------------------------------------------  --------------  CALL BACK INFO  What is the best way for the office to contact you? OK to leave message on   voicemail  Preferred Call Back Phone Number?  [phone]

## 2020-10-23 ENCOUNTER — OFFICE VISIT (OUTPATIENT)
Dept: PRIMARY CARE CLINIC | Age: 45
End: 2020-10-23
Payer: COMMERCIAL

## 2020-10-23 PROCEDURE — 99211 OFF/OP EST MAY X REQ PHY/QHP: CPT | Performed by: NURSE PRACTITIONER

## 2020-10-23 NOTE — PROGRESS NOTES
Janice Albrecht received a viral test for COVID-19. They were educated on isolation and quarantine as appropriate. For any symptoms, they were directed to seek care from their PCP, given contact information to establish with a doctor, directed to an urgent care or the emergency room. =

## 2020-10-26 LAB — SARS-COV-2, NAA: NOT DETECTED

## 2020-10-26 NOTE — RESULT ENCOUNTER NOTE

## 2020-11-11 RX ORDER — SILDENAFIL CITRATE 20 MG/1
20-40 TABLET ORAL DAILY PRN
Qty: 20 TABLET | Refills: 0 | Status: SHIPPED | OUTPATIENT
Start: 2020-11-11 | End: 2021-03-01

## 2020-11-17 ENCOUNTER — OFFICE VISIT (OUTPATIENT)
Dept: PRIMARY CARE CLINIC | Age: 45
End: 2020-11-17
Payer: COMMERCIAL

## 2020-11-17 PROCEDURE — 99211 OFF/OP EST MAY X REQ PHY/QHP: CPT | Performed by: NURSE PRACTITIONER

## 2020-11-17 NOTE — PROGRESS NOTES
Renee Level received a viral test for COVID-19. They were educated on isolation and quarantine as appropriate. For any symptoms, they were directed to seek care from their PCP, given contact information to establish with a doctor, directed to an urgent care or the emergency room.

## 2020-11-20 LAB — SARS-COV-2, NAA: NOT DETECTED

## 2021-02-24 ENCOUNTER — TELEPHONE (OUTPATIENT)
Dept: FAMILY MEDICINE CLINIC | Age: 46
End: 2021-02-24

## 2021-02-24 DIAGNOSIS — M79.602 LEFT ARM PAIN: Primary | ICD-10-CM

## 2021-02-24 NOTE — TELEPHONE ENCOUNTER
----- Message from Virgen Goldstein sent at 2/24/2021  8:22 AM EST -----  Subject: Referral Request    QUESTIONS   Reason for referral request? Pt believes he hyper-extended arm and is   requesting a referral to an orthopedic surgeon he has seen in the past-   [facility] at [address]. Has the physician seen you for this condition before? No   Preferred Specialist (if applicable)? Whit Mendosa  Do you already have an appointment scheduled? No  Additional Information for Provider?   ---------------------------------------------------------------------------  --------------  CALL BACK INFO  What is the best way for the office to contact you? OK to leave message on   voicemail  Preferred Call Back Phone Number?  [phone]

## 2021-03-01 ENCOUNTER — OFFICE VISIT (OUTPATIENT)
Dept: ORTHOPEDIC SURGERY | Age: 46
End: 2021-03-01
Payer: COMMERCIAL

## 2021-03-01 VITALS
HEART RATE: 65 BPM | BODY MASS INDEX: 26.14 KG/M2 | DIASTOLIC BLOOD PRESSURE: 81 MMHG | HEIGHT: 72 IN | WEIGHT: 193 LBS | TEMPERATURE: 97.5 F | SYSTOLIC BLOOD PRESSURE: 121 MMHG

## 2021-03-01 DIAGNOSIS — M25.532 LEFT WRIST PAIN: ICD-10-CM

## 2021-03-01 DIAGNOSIS — M25.522 LEFT ELBOW PAIN: Primary | ICD-10-CM

## 2021-03-01 DIAGNOSIS — M77.12 LEFT LATERAL EPICONDYLITIS: ICD-10-CM

## 2021-03-01 DIAGNOSIS — S63.502A LEFT WRIST SPRAIN, INITIAL ENCOUNTER: ICD-10-CM

## 2021-03-01 PROCEDURE — 99214 OFFICE O/P EST MOD 30 MIN: CPT | Performed by: ORTHOPAEDIC SURGERY

## 2021-03-01 ASSESSMENT — ENCOUNTER SYMPTOMS
ALLERGIC/IMMUNOLOGIC NEGATIVE: 1
GASTROINTESTINAL NEGATIVE: 1
EYES NEGATIVE: 1
RESPIRATORY NEGATIVE: 1

## 2021-03-01 NOTE — PROGRESS NOTES
Subjective:      Patient ID: Varun Galloway is a 41 y.o. male. HPI  Varun Galloway seen today for evaluation of his left arm. On February 19 he was doing a martial arts workout when he was training partner fell on his back and hyperextended his left arm and wrist.  Initially he had more significant pain. It has been improving. Has been using an Ace bandage as well as ice and ibuprofen. He now currently complains of discomfort principally in the ulnar aspect of the left wrist with occasional elbow pain. He has pain with pressing and pulling movements. He denies left elbow problems in the past.  He had a cyst on his wrist removed when he was at high school. I have seen him in the past for meniscus injury. 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 Review of Systems   Constitutional: Negative. HENT: Negative. Eyes: Negative. Respiratory: Negative. Cardiovascular: Negative. Gastrointestinal: Negative. Endocrine: Negative. Genitourinary: Negative. Musculoskeletal: Positive for arthralgias. Left elbow pain    Skin: Negative. Allergic/Immunologic: Negative. Neurological: Negative. Hematological: Negative. Psychiatric/Behavioral: Negative. Objective:   Physical Exam  History: Patient's relevant past family, medical, and social history are reviewed as part of today's visit. ROS of pertinent positives and negatives as above; otherwise negative. General Exam:    Vitals: Blood pressure 121/81, pulse 65, temperature 97.5 °F (36.4 °C), temperature source Temporal, height 6' (1.829 m), weight 193 lb (87.5 kg). Constitutional: Patient is adequately groomed with no evidence of malnutrition  Mental Status: The patient is oriented to time, place and person. The patient's mood and affect are appropriate. Gait:  Patient walks with normal gait and station.   Lymphatic: The lymphatic examination bilaterally reveals all areas to be without enlargement or induration. Vascular: Examination reveals no swelling or calf tenderness. Peripheral pulses are palpable and 2+. Neurological: The patient has good coordination. There is no weakness or sensory deficit. Skin:    Head/Neck: inspection reveals no rashes, ulcerations or lesions. Trunk:  inspection reveals no rashes, ulcerations or lesions. Right Lower Extremity: inspection reveals no rashes, ulcerations or lesions. Left Lower Extremity: inspection reveals no rashes, ulcerations or lesions. Right elbow exam is normal.  He has no tenderness. Is full range of motion. He has good  strength and normal stability. Right wrist exam is normal.  He has no tenderness. He has full range of motion and normal stability with a normal neurovascular exam.    Left wrist has trace tenderness along the flexor aspect of the ulnar side. He has normal strength and motion with no crepitation. He has mild discomfort loading the TFCC. He has mild discomfort with wrist extension and wrist flexion. Left elbow has no tenderness over the medial lateral epicondyle and full range of motion with a normal neurovascular exam.    Left wrist x-rays obtained today AP, lateral, and oblique views demonstrate: No bony abnormalities. There is trace degenerative change at the radiocarpal joint. Assessment:      Left elbow and wrist hyperextension that is improving      Plan: At this point we will treat this with observation. If he has recurrent or worsening symptoms in 3 to 4 weeks we would consider an MRI scan. He agrees with this plan. We discussed that at length. This note was created using voice recognition software. It has been proofread, but occasionally errors remain. Please disregard these errors. They will be corrected as they are noted.

## 2021-04-24 ENCOUNTER — HOSPITAL ENCOUNTER (EMERGENCY)
Age: 46
Discharge: HOME | End: 2021-04-24
Payer: COMMERCIAL

## 2021-04-24 ENCOUNTER — APPOINTMENT (OUTPATIENT)
Dept: GENERAL RADIOLOGY | Age: 46
End: 2021-04-24
Payer: COMMERCIAL

## 2021-04-24 VITALS
DIASTOLIC BLOOD PRESSURE: 83 MMHG | OXYGEN SATURATION: 98 % | SYSTOLIC BLOOD PRESSURE: 140 MMHG | HEART RATE: 82 BPM | RESPIRATION RATE: 20 BRPM | TEMPERATURE: 98.3 F

## 2021-04-24 DIAGNOSIS — R03.0 ELEVATED BLOOD PRESSURE READING: ICD-10-CM

## 2021-04-24 DIAGNOSIS — M25.562 ACUTE PAIN OF LEFT KNEE: Primary | ICD-10-CM

## 2021-04-24 PROCEDURE — 73560 X-RAY EXAM OF KNEE 1 OR 2: CPT

## 2021-04-24 PROCEDURE — 99284 EMERGENCY DEPT VISIT MOD MDM: CPT

## 2021-04-24 ASSESSMENT — PAIN DESCRIPTION - PAIN TYPE: TYPE: ACUTE PAIN

## 2021-04-24 ASSESSMENT — PAIN DESCRIPTION - FREQUENCY: FREQUENCY: CONTINUOUS

## 2021-04-24 ASSESSMENT — PAIN DESCRIPTION - DESCRIPTORS: DESCRIPTORS: SORE

## 2021-04-24 ASSESSMENT — PAIN DESCRIPTION - ORIENTATION: ORIENTATION: LEFT

## 2021-04-24 NOTE — ED NOTES
Discharge and education instructions reviewed. Patient verbalized understanding, teach-back successful. Patient denied questions at this time. No acute distress noted. Patient instructed to follow-up as noted - return to emergency department if symptoms worsen. Patient verbalized understanding. Discharged per EDMD with discharge instructions.           Chiquis Pérez RN  04/24/21 6265

## 2021-04-24 NOTE — ED TRIAGE NOTES
Patient with left knee pain after working out today, states he can put weight on it when the knee is slightly bent, but unable to straighten the knee.

## 2021-04-24 NOTE — ED PROVIDER NOTES
[facility]        Pt Name: Jovi Hanley  MRN: [id number]  [place] 1975  Date of evaluation: 4/24/2021  Provider: NIMCO Jay    This patient was not seen and evaluated by the attending physician No att. providers found. CHIEF COMPLAINT       Chief Complaint   Patient presents with    Knee Injury     pt states knee popped during jujitzu today. pain on medial side, sore to the touch. pain worse with ambulation          HISTORY OF PRESENT ILLNESS  (Location/Symptom, Timing/Onset, Context/Setting, Quality, Duration,Modifying Factors, Severity.)   Jovi Hanley is a 41 y.o. male who presents to the emergencydepartment for knee injury that occurred earlier today while doing a jujitsu move. He was laying supine on the ground with his knees flexed and feet on the floor. During this move, the other person was on top of him and then patient lifted his torso up and to the side, using he legs which caused a twisting movement of his knee. Has immediate pain on the medial aspect. Took ibuprofen prior to arrival.  denies fever chills nausea vomiting. Nursing Notes were reviewed and I agree. OF SYSTEMS    (2-9 systems for level 4, 10 or more for level 5)     Review of Systems   Constitutional: Negative for chills and fever. Gastrointestinal: Negative for nausea and vomiting. Musculoskeletal: Positive for arthralgias. Except as noted above the remainder of the review of systems was reviewed and negative.        PAST MEDICAL HISTORY         Diagnosis Date    Anxiety     Carpal tunnel syndrome, right 11/7/2018    Chronic seasonal allergic rhinitis due to pollen     Condyloma acuminata     Erectile dysfunction of organic origin     Herpes dermatitis     Nostril    Irritable bowel syndrome with diarrhea     Major depression single episode, in partial remission ([facility] [address]      no meds    Prolonged emergence from general anesthesia     family hx-father    Testosterone deficiency        SURGICAL HISTORY         Procedure Laterality Date    CARPAL TUNNEL RELEASE Right 12/20/2018    RIGHT CARPAL TUNNEL RELEASE performed by Freeman Slaughter MD at [address]  2008    ELBOW FRACTURE SURGERY Left Age 7 or 8    HAND SURGERY Right 2010    Thumb ORIF    PENIS SURGERY      Condyloma destruction with laser.  [place] REPAIR MAJOR PERIPHERAL NERVE Right 12/20/2018    RIGHT ULNAR NERVE DECOMPRESSION AT ELBOW performed by Freeman Slaughter MD at [facility]    Right    WRIST SURGERY  2009    cyst removed    WRIST SURGERY Left 2012    Cyst removal       CURRENT MEDICATIONS       Discharge Medication List as of 4/24/2021  6:23 PM      CONTINUE these medications which have NOT CHANGED    Details   Probiotic Product (PROBIOTIC DAILY PO) Take 1 tablet by mouth dailyHistorical Med             ALLERGIES     Patient has no known allergies. FAMILY HISTORY           Problem Relation Age of Onset    No Known Problems Mother     High Blood Pressure Father     Heart Disease Maternal Grandfather         MI    Cancer Paternal Grandmother         Colon    No Known Problems Sister     No Known Problems Brother     No Known Problems Maternal Uncle     Cancer Paternal Aunt         Colon    No Known Problems Paternal Uncle     No Known Problems Maternal Grandmother     Stroke Paternal Grandfather     No Known Problems Other      Family Status   Relation Name Status    Mother  Alive    Father  Alive    MGF  (Not Specified)    PGM  (Not Specified)   [place] Sister  (Not Specified)    Brother  (Not Specified)    MUnc  (Not Specified)    PAunt  (Not Specified)    PUnc  (Not Specified)    MGM  (Not Specified)    PGF  (Not Specified)    Other  (Not Specified)        SOCIAL HISTORY      reports that he has never smoked. He has never used smokeless tobacco. He reports current alcohol use.  He reports that he does not use drugs. PHYSICAL EXAM    (up to 7 for level 4, 8 or more for level 5)     ED Triage Vitals [04/24/21 1645]   BP Temp Temp Source Pulse Resp SpO2 Height Weight   (!) 140/83 98.3 °F (36.8 °C) Oral 82 20 98 % -- --       Physical Exam  Constitutional:       General: He is not in acute distress. Appearance: Normal appearance. He is not ill-appearing, toxic-appearing or diaphoretic. HENT:      Head: Normocephalic and atraumatic. Neck:      Musculoskeletal: Normal range of motion and neck supple. Pulmonary:      Effort: Pulmonary effort is normal. No respiratory distress. Musculoskeletal:      Comments: Mild TTP of the medial aspect of the left knee. Mild amount of edema noted on superior and medial aspect of the knee. No erythema. Knee has mildly decreased ROM due to pain. Thigh and ankle nontender. PT pulse 2+. Skin:     General: Skin is warm. Neurological:      Mental Status: He is alert. Psychiatric:         Mood and Affect: Mood normal.         Behavior: Behavior normal.         Thought Content: Thought content normal.         Judgment: Judgment normal.         DIFFERENTIAL DIAGNOSIS   Fracture, dislocation, soft tissue injury      DIAGNOSTICRESULTS         RADIOLOGY:   Non-plain film images such as CT, Ultrasound and MRI are read by the radiologist. Plain radiographic images are visualized and preliminarily interpreted by NIMCO Sanchez with the below findings:      Interpretation per the Radiologist below, if available at the time of this note:    XR KNEE LEFT (1-2 VIEWS)   Final Result   No acute osseous abnormality identified. LABS:  No results found for this visit on 04/24/21. All other labs were within normal range or not returned as of this dictation.     EMERGENCY DEPARTMENT COURSE and DIFFERENTIALDIAGNOSIS/MDM:   Vitals:    Vitals:    04/24/21 1645   BP: (!) 140/83   Pulse: 82   Resp: 20   Temp: 98.3 °F (36.8 °C)   TempSrc: Oral   SpO2: 98% Patient wasnontoxic, well appearing, afebrile with normal vital signs with the exception of HTN. Saturating well on room air. Leg is neurovascularly intact. Xray negative  Discussed concern for meniscus injury. Discussed will likely need outpatient MRI. He already has an ortho that he sees who he can contact for FU. Offered crutches but he is concerned about cost and will try to find them else cheaper. Instructed to continue ibuprofen, ice. Return for worsening. He agreed and understood. PROCEDURES:  None    FINAL IMPRESSION      1. Acute pain of left knee    2.  Elevated blood pressure reading        DISPOSITION/PLAN   DISPOSITION Decision To Discharge 04/24/2021 06:14:48 PM      PATIENT REFERRED TO:  Sheng Krause MD  [address] [place] [address]  [phone]    Schedule an appointment as soon as possible for a visit in 2 days  for reevaluation    [facility] Emergency Department  [address] [place] [address]  [phone]    As needed, If symptoms worsen      DISCHARGE MEDICATIONS:  Discharge Medication List as of 4/24/2021  6:23 PM          (Please note that portions ofthis note were completed with a voice recognition program.  Efforts were made to edit the dictations but occasionally words are mis-transcribed.)    Hector Tovar, [address] [place] [address]  04/25/21 3671

## 2021-04-25 ASSESSMENT — ENCOUNTER SYMPTOMS
VOMITING: 0
NAUSEA: 0

## 2021-04-26 ENCOUNTER — OFFICE VISIT (OUTPATIENT)
Dept: ORTHOPEDIC SURGERY | Age: 46
End: 2021-04-26
Payer: COMMERCIAL

## 2021-04-26 VITALS
BODY MASS INDEX: 26.14 KG/M2 | RESPIRATION RATE: 12 BRPM | DIASTOLIC BLOOD PRESSURE: 75 MMHG | SYSTOLIC BLOOD PRESSURE: 133 MMHG | WEIGHT: 193 LBS | HEIGHT: 72 IN | HEART RATE: 79 BPM

## 2021-04-26 DIAGNOSIS — M25.562 ACUTE PAIN OF LEFT KNEE: Primary | ICD-10-CM

## 2021-04-26 DIAGNOSIS — M23.92 INTERNAL DERANGEMENT OF LEFT KNEE: ICD-10-CM

## 2021-04-26 PROCEDURE — 99214 OFFICE O/P EST MOD 30 MIN: CPT | Performed by: ORTHOPAEDIC SURGERY

## 2021-04-26 ASSESSMENT — ENCOUNTER SYMPTOMS
GASTROINTESTINAL NEGATIVE: 1
RESPIRATORY NEGATIVE: 1
EYES NEGATIVE: 1
ALLERGIC/IMMUNOLOGIC NEGATIVE: 1

## 2021-04-26 NOTE — PROGRESS NOTES
Subjective:      Patient ID: Rodney Arambula is a 41 y.o. male. YAN Arambula seen today for an injury that occurred doing martial arts on [address]. He was throwing someone often. He felt a pop in the medial left knee. He now says pain is 7 out of 10 with weightbearing. He was seen in the ER. He has been using crutches and using ibuprofen and ice. I have seen him in the past for right knee issues. Review of Systems   Constitutional: Negative. HENT: Negative. Eyes: Negative. Respiratory: Negative. Cardiovascular: Negative. Gastrointestinal: Negative. Endocrine: Negative. Genitourinary: Negative. Musculoskeletal: Negative. Skin: Negative. Allergic/Immunologic: Negative. Neurological: Negative. Hematological: Negative. Psychiatric/Behavioral: Negative. Objective:   Physical Exam  History: Patient's relevant past family, medical, and social history are reviewed as part of today's visit. ROS of pertinent positives and negatives as above; otherwise negative. General Exam:    Vitals: Blood pressure 133/75, pulse 79, resp. rate 12, height 6' (1.829 m), weight 193 lb (87.5 kg). Constitutional: Patient is adequately groomed with no evidence of malnutrition  Mental Status: The patient is oriented to time, place and person. The patient's mood and affect are appropriate. Gait:  Patient walks with a flexed knee and crutches lymphatic: The lymphatic examination bilaterally reveals all areas to be without enlargement or induration. Vascular: Examination reveals no swelling or calf tenderness. Peripheral pulses are palpable and 2+. Neurological: The patient has good coordination. There is no weakness or sensory deficit. Skin:    Head/Neck: inspection reveals no rashes, ulcerations or lesions. Trunk:  inspection reveals no rashes, ulcerations or lesions. Right Lower Extremity: inspection reveals no rashes, ulcerations or lesions.   Left Lower Extremity: inspection reveals no rashes, ulcerations or lesions. Examination of the bilateral hips reveals normal flexion and extension. There is no restriction in rotation. There is no tenderness to palpation anteriorly posteriorly or laterally. Right knee is benign today. Left knee has mild swelling. He has tenderness to palpation over the tibial plateau and medial joint line as well as the MCL. He has no opening on stressing the MCL. Lachman is stable. Posterior drawer is normal.  Lateral side is normal.  Range of motion is 10 to 90 degrees. 2 view left knee x-rays from the emergency department are reviewed and interpreted and these are normal.        Assessment:      Left knee MCL versus medial meniscus tear      Plan:      MRI scan left knee. Follow-up with me after the scan. This note was created using voice recognition software. It has been proofread, but occasionally errors remain. Please disregard these errors. They will be corrected as they are noted.

## 2021-04-27 ENCOUNTER — OFFICE VISIT (OUTPATIENT)
Dept: ORTHOPEDIC SURGERY | Age: 46
End: 2021-04-27
Payer: COMMERCIAL

## 2021-04-27 ENCOUNTER — OFFICE VISIT (OUTPATIENT)
Dept: FAMILY MEDICINE CLINIC | Age: 46
End: 2021-04-27
Payer: COMMERCIAL

## 2021-04-27 ENCOUNTER — TELEPHONE (OUTPATIENT)
Dept: ORTHOPEDIC SURGERY | Age: 46
End: 2021-04-27

## 2021-04-27 ENCOUNTER — ANESTHESIA EVENT (OUTPATIENT)
Dept: OPERATING ROOM | Age: 46
End: 2021-04-27
Payer: COMMERCIAL

## 2021-04-27 VITALS
SYSTOLIC BLOOD PRESSURE: 114 MMHG | WEIGHT: 187 LBS | BODY MASS INDEX: 25.33 KG/M2 | DIASTOLIC BLOOD PRESSURE: 74 MMHG | HEIGHT: 72 IN

## 2021-04-27 VITALS
SYSTOLIC BLOOD PRESSURE: 119 MMHG | BODY MASS INDEX: 25.33 KG/M2 | WEIGHT: 187 LBS | HEIGHT: 72 IN | HEART RATE: 68 BPM | DIASTOLIC BLOOD PRESSURE: 78 MMHG

## 2021-04-27 DIAGNOSIS — M25.562 ACUTE PAIN OF LEFT KNEE: Primary | ICD-10-CM

## 2021-04-27 DIAGNOSIS — Z01.818 PREOPERATIVE GENERAL PHYSICAL EXAMINATION: Primary | ICD-10-CM

## 2021-04-27 DIAGNOSIS — F41.9 ANXIETY: ICD-10-CM

## 2021-04-27 DIAGNOSIS — S83.207A ACUTE MENISCAL TEAR OF LEFT KNEE, INITIAL ENCOUNTER: ICD-10-CM

## 2021-04-27 DIAGNOSIS — S83.212A BUCKET-HANDLE TEAR OF MEDIAL MENISCUS OF LEFT KNEE AS CURRENT INJURY, INITIAL ENCOUNTER: ICD-10-CM

## 2021-04-27 PROCEDURE — 99214 OFFICE O/P EST MOD 30 MIN: CPT | Performed by: ORTHOPAEDIC SURGERY

## 2021-04-27 PROCEDURE — 99242 OFF/OP CONSLTJ NEW/EST SF 20: CPT | Performed by: FAMILY MEDICINE

## 2021-04-27 RX ORDER — PROMETHAZINE HYDROCHLORIDE 25 MG/1
25 TABLET ORAL EVERY 6 HOURS PRN
Qty: 30 TABLET | Refills: 0 | Status: SHIPPED | OUTPATIENT
Start: 2021-04-28 | End: 2021-04-27

## 2021-04-27 RX ORDER — ALPRAZOLAM 0.5 MG/1
0.5 TABLET ORAL 3 TIMES DAILY PRN
Qty: 21 TABLET | Refills: 0 | Status: SHIPPED | OUTPATIENT
Start: 2021-04-27 | End: 2021-12-20 | Stop reason: SDUPTHER

## 2021-04-27 RX ORDER — DOCUSATE SODIUM 100 MG/1
100 CAPSULE, LIQUID FILLED ORAL 2 TIMES DAILY
Qty: 60 CAPSULE | Refills: 0 | Status: SHIPPED | OUTPATIENT
Start: 2021-04-28 | End: 2021-04-27

## 2021-04-27 RX ORDER — IBUPROFEN 200 MG
400 TABLET ORAL PRN
COMMUNITY
End: 2022-03-03

## 2021-04-27 RX ORDER — MULTIVIT-MIN/IRON/FOLIC ACID/K 18-600-40
1 CAPSULE ORAL DAILY
COMMUNITY
End: 2021-12-20

## 2021-04-27 RX ORDER — HYDROCODONE BITARTRATE AND ACETAMINOPHEN 5; 325 MG/1; MG/1
1 TABLET ORAL EVERY 6 HOURS PRN
Qty: 20 TABLET | Refills: 0 | Status: SHIPPED | OUTPATIENT
Start: 2021-04-27 | End: 2021-04-27

## 2021-04-27 ASSESSMENT — ENCOUNTER SYMPTOMS
SHORTNESS OF BREATH: 0
CONSTIPATION: 0
ABDOMINAL PAIN: 0
DIARRHEA: 0
VOMITING: 0
WHEEZING: 0
NAUSEA: 0
SORE THROAT: 0
RHINORRHEA: 0
BLOOD IN STOOL: 0
COUGH: 0

## 2021-04-27 NOTE — PROGRESS NOTES
[facility] time__0920__________        Surgery time___1120_________    Take the following medications with a sip of water: Follow your MD/Surgeons pre-procedure instructions regarding your medications    Do not eat or drink anything after 12:00 midnight prior to your surgery. This includes water chewing gum, mints and ice chips. You may brush your teeth and gargle the morning of your surgery, but do not swallow the water     Please see your family doctor/pediatrician for a history and physical and/or concerning medications. Bring any test results/reports from your physicians office. If you are under the care of a heart doctor or specialist doctor, please be aware that you may be asked to them for clearance    You may be asked to stop blood thinners such as Coumadin, Plavix, Fragmin, Lovenox, etc., or any anti-inflammatories such as:  Aspirin, Ibuprofen, Advil, Naproxen prior to your surgery. We also ask that you stop any OTC medications such as fish oil, vitamin E, glucosamine, garlic, Multivitamins, COQ 10, etc. MAY TAKE TYLENOL    We ask that you do not smoke 24 hours prior to surgery  We ask that you do not  drink any alcoholic beverages 24 hours prior to surgery     You must make arrangements for a responsible adult to take you home after your surgery. For your safety you will not be allowed to leave alone or drive yourself home. Your surgery will be cancelled if you do not have a ride home. Also for your safety, it is strongly suggested that someone stay with you the first 24 hours after your surgery. A parent or legal guardian must accompany a child scheduled for surgery and plan to stay at the hospital until the child is discharged. Please do not bring other children with you. For your comfort, please wear simple loose fitting clothing to the hospital.  Please do not bring valuables.     Do not wear any make-up or nail polish on your surgery. Remember. [employer]anna [employer]ighbours [employer]ighb[employer] Safety First! Call before you Fall Remember

## 2021-04-27 NOTE — PROGRESS NOTES
C-Difficile admission screening and protocol:     * Admitted with diarrhea? NO     *Prior history of C-Diff. In last 3 months? NO     *Antibiotic use in the past 6-8 weeks? NO     *Prior hospitalization or nursing home in the last month?      NO

## 2021-04-27 NOTE — PROGRESS NOTES
Subjective:      Patient ID: Jovi Hanley is a 41 y.o. male. HPI  PREOPERATIVE PHYSICAL:    Patient was sent by Dr. Eleazar Su for preoperative clearance to have Left Knee Arthroscopy done at [facility] on 4-28-21. His Ibuprofen is on hold for the surgery. His last dose was on 4-26-21. No Known Allergies  Current Outpatient Medications   Medication Sig Dispense Refill    ibuprofen (ADVIL;MOTRIN) 200 MG tablet Take 400 mg by mouth as needed for Pain       Ascorbic Acid (VITAMIN C) 500 MG CAPS Take 1 tablet by mouth daily      Cholecalciferol (VITAMIN D3) 125 MCG (5000 UT) TABS Take 1 tablet by mouth daily      Probiotic Product (PROBIOTIC DAILY PO) Take 1 tablet by mouth as needed        No current facility-administered medications for this visit. Past Medical History:   Diagnosis Date    Anxiety     Chronic seasonal allergic rhinitis due to pollen     Condyloma acuminata     Erectile dysfunction of organic origin     Herpes dermatitis     Nostril    Irritable bowel syndrome with diarrhea     Major depression single episode, in partial remission ([facility] [address]      no meds    Testosterone deficiency      Past Surgical History:   Procedure Laterality Date    CARPAL TUNNEL RELEASE Right 12/20/2018    RIGHT CARPAL TUNNEL RELEASE performed by Jerome Vera MD at [address] [place] [address]  2008   [address] Left Age 7 or 8    HAND SURGERY Right 2010    Thumb ORIF    PENIS SURGERY      Condyloma destruction with laser.      [place] REPAIR MAJOR PERIPHERAL NERVE Right 12/20/2018    RIGHT ULNAR NERVE DECOMPRESSION AT ELBOW performed by Jerome Vera MD at [facility] Right 2000    WRIST SURGERY Right 2009    cyst removed    WRIST SURGERY Left 2012    Cyst removal     Social History     Tobacco Use    Smoking status: Never Smoker    Smokeless tobacco: Never Used   Substance Use Topics    Alcohol use: Yes     Comment: Occasional    Drug use: Never     Comment: Tenderness: There is no abdominal tenderness. There is no guarding or rebound. Hernia: No hernia is present. Lymphadenopathy:      Cervical: No cervical adenopathy. Neurological:      Mental Status: He is alert and oriented to person, place, and time. Assessment:      Preoperative Physical  Left Knee Meniscal Tear       Plan:      Patient is cleared for surgery.    RTO as needed         [address],

## 2021-04-27 NOTE — PROGRESS NOTES
Taylor Garcia returns today for his left knee. He still really struggling to get it straight and still has pain that is about 5 or 6 out of 10. I have reviewed the patient's medical history in detail and updated the computerized patient record. General Exam:    Vitals: Blood pressure 119/78, pulse 68, height 6' (1.829 m), weight 187 lb (84.8 kg). Constitutional: Patient is adequately groomed with no evidence of malnutrition  Mental Status: The patient is oriented to time, place and person. The patient's mood and affect are appropriate. Left knee has a moderate effusion. He has intense pain medially. Range of motion is 5 to about 95 degrees. Calf is soft. MRI scan left knee is reviewed. It demonstrates:                FINDINGS:  A bucket-handle tear of the medial meniscus is present with a displaced medial    meniscal bucket-handle fragment within the medial aspect of the intercondylar notch (coronal PD    SPIR series 401, images 14 through 20).  Silvio Minus is no evidence of lateral meniscal tear.       Mild subchondral stress reaction is present within the medial aspect of the medial tibial    plateau without stress fracture (coronal PD SPIR series 401, images 16 and 17) which can be    seen as a secondary sign of medial meniscal tears.       The ACL, PCL, medial and lateral collateral ligaments and extensor mechanism are intact.       A small chondral fissure is present within the patellofemoral compartment involving the medial    patellar facet (axial T2 series 301, image 12).       No chondromalacia is apparent within the medial or lateral compartments.       A small knee effusion is present.       A partially dehisced/ruptured Baker's cyst is present within the posteromedial aspect of the    knee with associated fluid tracking within the posteromedial aspect of the inferior to the    Baker's cyst.       CONCLUSION:   1. A bundle tear of the medial meniscus with a displaced medial meniscal window from their procedure. The patient was made aware that luis angel Covid-19  may worsen their prognosis for recovering from their procedure  and lend to a higher morbidity and/or mortality risk. All material risks, benefits, and reasonable alternatives including postponing the procedure were discussed. The patient does wish to proceed with the procedure at this time. Medical decision making today was moderate. This note was created using voice recognition software. It has been proofread, but occasionally errors remain. Please disregard these errors. They will be corrected as they are noted.

## 2021-04-27 NOTE — LETTER
[facility]  Surgery Precert & Billing Form:    DEMOGRAPHICS:                                                                                                       Patient Name:  Jaleesa Lehman  Patient :  89   Patient SS#:      Patient Phone:  [phone] (home)  Alt. Patient Phone:    Patient Address:  [address] [place] [address] [address]  [place] [address]    PCP:  Nikko Morelos DO  Insurance: [address] [place] [address] Street:        [address] [place] [address]  SCOPE MEDIAL MENISCECTOMY VS REPAIR                                                                               Diagnosis: MMT  Operation: left KNEE    SURGERY  INFORMATION  Date of Surgery:   21  Location:    [facility]   Type:    OUTPATIENT  Surgeon:          Faye Crawford MD         21     BILLING INFORMATION:                                                                                                Physician Procedure                                            CPT Codes                      PA, or Fellow Procedure                                      CPT Codes                      Precert information:  Per Yas Sun at [facility] no prior authorization needed for [id number] [phone]. Reference # [id number].

## 2021-04-27 NOTE — TELEPHONE ENCOUNTER
Called insurance regarding surgery. Per Seema at . [facility] no prior authorization needed for [id number] and [phone]. Reference # [id number].

## 2021-04-28 ENCOUNTER — ANESTHESIA (OUTPATIENT)
Dept: OPERATING ROOM | Age: 46
End: 2021-04-28
Payer: COMMERCIAL

## 2021-04-28 ENCOUNTER — HOSPITAL ENCOUNTER (OUTPATIENT)
Age: 46
Setting detail: OUTPATIENT SURGERY
Discharge: HOME | End: 2021-04-28
Attending: ORTHOPAEDIC SURGERY | Admitting: ORTHOPAEDIC SURGERY
Payer: COMMERCIAL

## 2021-04-28 VITALS
TEMPERATURE: 98 F | OXYGEN SATURATION: 99 % | BODY MASS INDEX: 25.23 KG/M2 | HEIGHT: 72 IN | DIASTOLIC BLOOD PRESSURE: 70 MMHG | RESPIRATION RATE: 12 BRPM | SYSTOLIC BLOOD PRESSURE: 136 MMHG | WEIGHT: 186.29 LBS | HEART RATE: 60 BPM

## 2021-04-28 VITALS
SYSTOLIC BLOOD PRESSURE: 102 MMHG | OXYGEN SATURATION: 99 % | RESPIRATION RATE: 7 BRPM | DIASTOLIC BLOOD PRESSURE: 59 MMHG

## 2021-04-28 LAB — SARS-COV-2, NAAT: NOT DETECTED

## 2021-04-28 PROCEDURE — 7100000010 HC PHASE II RECOVERY - FIRST 15 MIN: Performed by: ORTHOPAEDIC SURGERY

## 2021-04-28 PROCEDURE — 2709999900 HC NON-CHARGEABLE SUPPLY: Performed by: ORTHOPAEDIC SURGERY

## 2021-04-28 PROCEDURE — 6360000002 HC RX W HCPCS: Performed by: NURSE ANESTHETIST, CERTIFIED REGISTERED

## 2021-04-28 PROCEDURE — 2500000003 HC RX 250 WO HCPCS: Performed by: ORTHOPAEDIC SURGERY

## 2021-04-28 PROCEDURE — 2580000003 HC RX 258: Performed by: ANESTHESIOLOGY

## 2021-04-28 PROCEDURE — 6360000002 HC RX W HCPCS: Performed by: ORTHOPAEDIC SURGERY

## 2021-04-28 PROCEDURE — 3600000014 HC SURGERY LEVEL 4 ADDTL 15MIN: Performed by: ORTHOPAEDIC SURGERY

## 2021-04-28 PROCEDURE — 3700000001 HC ADD 15 MINUTES (ANESTHESIA): Performed by: ORTHOPAEDIC SURGERY

## 2021-04-28 PROCEDURE — 3600000004 HC SURGERY LEVEL 4 BASE: Performed by: ORTHOPAEDIC SURGERY

## 2021-04-28 PROCEDURE — 2500000003 HC RX 250 WO HCPCS: Performed by: NURSE ANESTHETIST, CERTIFIED REGISTERED

## 2021-04-28 PROCEDURE — 7100000011 HC PHASE II RECOVERY - ADDTL 15 MIN: Performed by: ORTHOPAEDIC SURGERY

## 2021-04-28 PROCEDURE — 3700000000 HC ANESTHESIA ATTENDED CARE: Performed by: ORTHOPAEDIC SURGERY

## 2021-04-28 PROCEDURE — 2720000010 HC SURG SUPPLY STERILE: Performed by: ORTHOPAEDIC SURGERY

## 2021-04-28 PROCEDURE — 2580000003 HC RX 258: Performed by: ORTHOPAEDIC SURGERY

## 2021-04-28 PROCEDURE — 7100000001 HC PACU RECOVERY - ADDTL 15 MIN: Performed by: ORTHOPAEDIC SURGERY

## 2021-04-28 PROCEDURE — 7100000000 HC PACU RECOVERY - FIRST 15 MIN: Performed by: ORTHOPAEDIC SURGERY

## 2021-04-28 PROCEDURE — 87635 SARS-COV-2 COVID-19 AMP PRB: CPT

## 2021-04-28 RX ORDER — ONDANSETRON 2 MG/ML
INJECTION INTRAMUSCULAR; INTRAVENOUS PRN
Status: DISCONTINUED | OUTPATIENT
Start: 2021-04-28 | End: 2021-04-28 | Stop reason: SDUPTHER

## 2021-04-28 RX ORDER — GLYCOPYRROLATE 0.2 MG/ML
INJECTION INTRAMUSCULAR; INTRAVENOUS PRN
Status: DISCONTINUED | OUTPATIENT
Start: 2021-04-28 | End: 2021-04-28 | Stop reason: SDUPTHER

## 2021-04-28 RX ORDER — SODIUM CHLORIDE, SODIUM LACTATE, POTASSIUM CHLORIDE, CALCIUM CHLORIDE 600; 310; 30; 20 MG/100ML; MG/100ML; MG/100ML; MG/100ML
INJECTION, SOLUTION INTRAVENOUS CONTINUOUS PRN
Status: COMPLETED | OUTPATIENT
Start: 2021-04-28 | End: 2021-04-28

## 2021-04-28 RX ORDER — LIDOCAINE HYDROCHLORIDE 20 MG/ML
INJECTION, SOLUTION EPIDURAL; INFILTRATION; INTRACAUDAL; PERINEURAL PRN
Status: DISCONTINUED | OUTPATIENT
Start: 2021-04-28 | End: 2021-04-28 | Stop reason: SDUPTHER

## 2021-04-28 RX ORDER — SODIUM CHLORIDE 9 MG/ML
25 INJECTION, SOLUTION INTRAVENOUS PRN
Status: DISCONTINUED | OUTPATIENT
Start: 2021-04-28 | End: 2021-04-28 | Stop reason: HOSPADM

## 2021-04-28 RX ORDER — FENTANYL CITRATE 50 UG/ML
25 INJECTION, SOLUTION INTRAMUSCULAR; INTRAVENOUS EVERY 5 MIN PRN
Status: DISCONTINUED | OUTPATIENT
Start: 2021-04-28 | End: 2021-04-28 | Stop reason: HOSPADM

## 2021-04-28 RX ORDER — DEXAMETHASONE SODIUM PHOSPHATE 4 MG/ML
INJECTION, SOLUTION INTRA-ARTICULAR; INTRALESIONAL; INTRAMUSCULAR; INTRAVENOUS; SOFT TISSUE PRN
Status: DISCONTINUED | OUTPATIENT
Start: 2021-04-28 | End: 2021-04-28 | Stop reason: SDUPTHER

## 2021-04-28 RX ORDER — KETOROLAC TROMETHAMINE 30 MG/ML
INJECTION, SOLUTION INTRAMUSCULAR; INTRAVENOUS PRN
Status: DISCONTINUED | OUTPATIENT
Start: 2021-04-28 | End: 2021-04-28 | Stop reason: SDUPTHER

## 2021-04-28 RX ORDER — BUPIVACAINE HYDROCHLORIDE AND EPINEPHRINE 2.5; 5 MG/ML; UG/ML
INJECTION, SOLUTION EPIDURAL; INFILTRATION; INTRACAUDAL; PERINEURAL
Status: COMPLETED | OUTPATIENT
Start: 2021-04-28 | End: 2021-04-28

## 2021-04-28 RX ORDER — SODIUM CHLORIDE 9 MG/ML
INJECTION, SOLUTION INTRAVENOUS CONTINUOUS
Status: DISCONTINUED | OUTPATIENT
Start: 2021-04-28 | End: 2021-04-28 | Stop reason: HOSPADM

## 2021-04-28 RX ORDER — OXYCODONE HYDROCHLORIDE AND ACETAMINOPHEN 5; 325 MG/1; MG/1
1 TABLET ORAL PRN
Status: DISCONTINUED | OUTPATIENT
Start: 2021-04-28 | End: 2021-04-28 | Stop reason: HOSPADM

## 2021-04-28 RX ORDER — OXYCODONE HYDROCHLORIDE AND ACETAMINOPHEN 5; 325 MG/1; MG/1
2 TABLET ORAL PRN
Status: DISCONTINUED | OUTPATIENT
Start: 2021-04-28 | End: 2021-04-28 | Stop reason: HOSPADM

## 2021-04-28 RX ORDER — PROPOFOL 10 MG/ML
INJECTION, EMULSION INTRAVENOUS PRN
Status: DISCONTINUED | OUTPATIENT
Start: 2021-04-28 | End: 2021-04-28 | Stop reason: SDUPTHER

## 2021-04-28 RX ORDER — MIDAZOLAM HYDROCHLORIDE 1 MG/ML
INJECTION INTRAMUSCULAR; INTRAVENOUS PRN
Status: DISCONTINUED | OUTPATIENT
Start: 2021-04-28 | End: 2021-04-28 | Stop reason: SDUPTHER

## 2021-04-28 RX ORDER — ONDANSETRON 2 MG/ML
4 INJECTION INTRAMUSCULAR; INTRAVENOUS
Status: DISCONTINUED | OUTPATIENT
Start: 2021-04-28 | End: 2021-04-28 | Stop reason: HOSPADM

## 2021-04-28 RX ORDER — SODIUM CHLORIDE 0.9 % (FLUSH) 0.9 %
10 SYRINGE (ML) INJECTION PRN
Status: DISCONTINUED | OUTPATIENT
Start: 2021-04-28 | End: 2021-04-28 | Stop reason: HOSPADM

## 2021-04-28 RX ORDER — SODIUM CHLORIDE 0.9 % (FLUSH) 0.9 %
10 SYRINGE (ML) INJECTION EVERY 12 HOURS SCHEDULED
Status: DISCONTINUED | OUTPATIENT
Start: 2021-04-28 | End: 2021-04-28 | Stop reason: HOSPADM

## 2021-04-28 RX ORDER — FENTANYL CITRATE 50 UG/ML
INJECTION, SOLUTION INTRAMUSCULAR; INTRAVENOUS PRN
Status: DISCONTINUED | OUTPATIENT
Start: 2021-04-28 | End: 2021-04-28 | Stop reason: SDUPTHER

## 2021-04-28 RX ADMIN — KETOROLAC TROMETHAMINE 30 MG: 30 INJECTION, SOLUTION INTRAMUSCULAR at 11:15

## 2021-04-28 RX ADMIN — SODIUM CHLORIDE: 9 INJECTION, SOLUTION INTRAVENOUS at 11:08

## 2021-04-28 RX ADMIN — GLYCOPYRROLATE 0.1 MG: 0.2 INJECTION, SOLUTION INTRAMUSCULAR; INTRAVENOUS at 11:08

## 2021-04-28 RX ADMIN — CEFAZOLIN SODIUM 2 G: 10 INJECTION, POWDER, FOR SOLUTION INTRAVENOUS at 11:08

## 2021-04-28 RX ADMIN — ONDANSETRON 4 MG: 2 INJECTION INTRAMUSCULAR; INTRAVENOUS at 11:15

## 2021-04-28 RX ADMIN — FENTANYL CITRATE 50 MCG: 50 INJECTION INTRAMUSCULAR; INTRAVENOUS at 11:12

## 2021-04-28 RX ADMIN — FENTANYL CITRATE 50 MCG: 50 INJECTION INTRAMUSCULAR; INTRAVENOUS at 11:08

## 2021-04-28 RX ADMIN — MIDAZOLAM 1 MG: 1 INJECTION INTRAMUSCULAR; INTRAVENOUS at 11:12

## 2021-04-28 RX ADMIN — MIDAZOLAM 1 MG: 1 INJECTION INTRAMUSCULAR; INTRAVENOUS at 11:08

## 2021-04-28 RX ADMIN — PROPOFOL 200 MG: 10 INJECTION, EMULSION INTRAVENOUS at 11:12

## 2021-04-28 RX ADMIN — LIDOCAINE HYDROCHLORIDE 5 ML: 20 INJECTION, SOLUTION EPIDURAL; INFILTRATION; INTRACAUDAL; PERINEURAL at 11:12

## 2021-04-28 RX ADMIN — DEXAMETHASONE SODIUM PHOSPHATE 10 MG: 4 INJECTION, SOLUTION INTRAMUSCULAR; INTRAVENOUS at 11:15

## 2021-04-28 RX ADMIN — SODIUM CHLORIDE: 9 INJECTION, SOLUTION INTRAVENOUS at 09:37

## 2021-04-28 ASSESSMENT — PULMONARY FUNCTION TESTS
PIF_VALUE: 2
PIF_VALUE: 3
PIF_VALUE: 11
PIF_VALUE: 2
PIF_VALUE: 13
PIF_VALUE: 12
PIF_VALUE: 3
PIF_VALUE: 13
PIF_VALUE: 13
PIF_VALUE: 11
PIF_VALUE: 11
PIF_VALUE: 2
PIF_VALUE: 12
PIF_VALUE: 13
PIF_VALUE: 12
PIF_VALUE: 11
PIF_VALUE: 17
PIF_VALUE: 10
PIF_VALUE: 11
PIF_VALUE: 2
PIF_VALUE: 12
PIF_VALUE: 11
PIF_VALUE: 12
PIF_VALUE: 12

## 2021-04-28 ASSESSMENT — PAIN SCALES - GENERAL
PAINLEVEL_OUTOF10: 0

## 2021-04-28 ASSESSMENT — ENCOUNTER SYMPTOMS: SHORTNESS OF BREATH: 0

## 2021-04-28 ASSESSMENT — LIFESTYLE VARIABLES: SMOKING_STATUS: 0

## 2021-04-28 NOTE — ANESTHESIA POSTPROCEDURE EVALUATION
Department of Anesthesiology  Postprocedure Note    Patient: Xiang Rasmussen  MRN: [id number]  YOB: 1975  Date of evaluation: 4/28/2021  Time:  12:38 PM     Procedure Summary     Date: 04/28/21 Room / Location: [address] [place] [address]    Anesthesia Start: 1108 Anesthesia Stop: 2560    Procedure: LEFT KNEE ARTHROSCOPY, MEDIAL MENISCECTOMY (Left Knee) Diagnosis:       Bucket-handle tear of medial meniscus of left knee as current injury, initial encounter      (LEFT KNEE MEDIAL MENISCUS TEAR)    Surgeons: Sheng Krause MD Responsible Provider: Jorge Alberto Alves MD    Anesthesia Type: general ASA Status: 2          Anesthesia Type: general    Kelli Phase I: Kelli Score: 9    Kelli Phase II: Kelli Score: 10    Last vitals: Reviewed and per EMR flowsheets.        Anesthesia Post Evaluation    Patient location during evaluation: PACU  Patient participation: complete - patient participated  Level of consciousness: awake and alert  Airway patency: patent  Nausea & Vomiting: no nausea and no vomiting  Complications: no  Cardiovascular status: hemodynamically stable  Respiratory status: acceptable  Hydration status: stable

## 2021-04-28 NOTE — BRIEF OP NOTE
Brief Postoperative Note      Patient: Paddy Arreola  YOB: 1975  MRN: [id number]    Date of Procedure: 4/28/2021    Pre-Op Diagnosis: LEFT KNEE MEDIAL MENISCUS TEAR    Post-Op Diagnosis: Same       Procedure(s):  LEFT KNEE ARTHROSCOPY, MEDIAL MENISCECTOMY    Surgeon(s):  Cuhn Seay MD    Assistant:  Surgical Assistant: Nikolas August    Anesthesia: General    Estimated Blood Loss (mL): Minimal    Complications: None    Specimens:   * No specimens in log *    Implants:  * No implants in log *      Drains: * No LDAs found *    Findings: medial meniscus tear    Electronically signed by Tino Smart MD on 4/28/2021 at 11:48 AM

## 2021-04-28 NOTE — ANESTHESIA PRE PROCEDURE
Department of Anesthesiology  Preprocedure Note       Name:  Mariano Aragon   Age:  41 y.o.  :  1975                                          MRN:  [id number]         Date:  2021      Surgeon: Bettye Prakash):  Gretchen Duvall MD    Procedure: Procedure(s):  LEFT KNEE ARTHROSCOPY, MEDIAL MENISCECTOMY VERSUS REPAIR    Medications prior to admission:   Prior to Admission medications    Medication Sig Start Date End Date Taking? Authorizing Provider   ibuprofen (ADVIL;MOTRIN) 200 MG tablet Take 400 mg by mouth as needed for Pain    Yes Historical Provider, MD   Ascorbic Acid (VITAMIN C) 500 MG CAPS Take 1 tablet by mouth daily   Yes Historical Provider, MD   Cholecalciferol (VITAMIN D3) 125 MCG (5000 UT) TABS Take 1 tablet by mouth daily   Yes Historical Provider, MD   ALPRAZolam (XANAX) 0.5 MG tablet Take 1 tablet by mouth 3 times daily as needed for Anxiety for up to 30 days.  21  Luz Paget, DO   Probiotic Product (PROBIOTIC DAILY PO) Take 1 tablet by mouth as needed     Historical Provider, MD       Current medications:    Current Facility-Administered Medications   Medication Dose Route Frequency Provider Last Rate Last Admin    ceFAZolin (ANCEF) 2000 mg in dextrose 5 % 100 mL IVPB  2,000 mg Intravenous Once Gretchen Duvall MD        0.9 % sodium chloride infusion   Intravenous Continuous Gertrudis Lopez  mL/hr at 21 0937 New Bag at 21 0937    sodium chloride flush 0.9 % injection 10 mL  10 mL Intravenous 2 times per day Gertrudis Lopez MD        sodium chloride flush 0.9 % injection 10 mL  10 mL Intravenous PRN Gertrudis Lopez MD        0.9 % sodium chloride infusion  25 mL Intravenous PRN Gertrudis Lopez MD           Allergies:  No Known Allergies    Problem List:    Patient Active Problem List   Diagnosis Code    Condyloma acuminata A63.0    DDD (degenerative disc disease), lumbar M51.36    Anxiety F41.9    Major depression single episode, in partial remission liquid consumption: 1800                        Time of last solid consumption: 1800                        Date of last liquid consumption: 04/27/21                        Date of last solid food consumption: 04/27/21    BMI:   Wt Readings from Last 3 Encounters:   04/28/21 186 lb 4.6 oz (84.5 kg)   04/27/21 187 lb (84.8 kg)   04/27/21 187 lb (84.8 kg)     Body mass index is 25.27 kg/m². CBC:   Lab Results   Component Value Date    WBC 5.4 07/21/2020    RBC 4.52 07/21/2020    HGB 15.1 07/21/2020    HCT 44.6 07/21/2020    MCV 98.5 07/21/2020    RDW 13.0 07/21/2020     07/21/2020       CMP:   Lab Results   Component Value Date     07/28/2017    K 4.7 07/28/2017     07/28/2017    CO2 28 07/28/2017    BUN 23 07/28/2017    CREATININE 0.9 07/28/2017    GFRAA >60 07/28/2017    AGRATIO 1.7 07/28/2017    LABGLOM >60 07/28/2017    GLUCOSE 76 07/28/2017    PROT 6.9 07/28/2017    CALCIUM 9.6 07/28/2017    BILITOT 0.8 07/28/2017    ALKPHOS 61 07/28/2017    AST 22 07/28/2017    ALT 28 07/28/2017       POC Tests: No results for input(s): POCGLU, POCNA, POCK, POCCL, POCBUN, POCHEMO, POCHCT in the last 72 hours.     Coags: No results found for: PROTIME, INR, APTT    HCG (If Applicable): No results found for: PREGTESTUR, PREGSERUM, HCG, HCGQUANT     ABGs: No results found for: PHART, PO2ART, EST9DES, UDD5XUA, BEART, X3UJFBLN     Type & Screen (If Applicable):  No results found for: LABABO, LABRH    Drug/Infectious Status (If Applicable):  No results found for: HIV, HEPCAB    COVID-19 Screening (If Applicable):   Lab Results   Component Value Date    COVID19 Not Detected 04/28/2021    COVID19 NOT DETECTED 11/17/2020           Anesthesia Evaluation  Patient summary reviewed no history of anesthetic complications:   Airway: Mallampati: II  TM distance: >3 FB   Neck ROM: full  Mouth opening: > = 3 FB Dental:      Comment: No loose teeth    Pulmonary: breath sounds clear to auscultation      (-) COPD, asthma,

## 2021-04-28 NOTE — H&P
Saima España was seen and examined. Left knee marked. All questions answered. No interval changes. Ready for surgery.

## 2021-04-28 NOTE — PROGRESS NOTES
Pt arrived to PACU from OR. Pt awake on room air. Denies c/o pain. Left leg with ace wrap C/D/I. +pulses noted. Ice pack applied.

## 2021-04-28 NOTE — PROGRESS NOTES
CLINICAL PHARMACY NOTE: MEDS TO [address] Select Patient?: No  Total # of Prescriptions Filled: 3   The following medications were delivered to the patient:  · Promethazine 25mg  · Hydrocodone/APAP 5/325  · Docusate 100mg  Total # of Interventions Completed: 0  Time Spent (min): 30    Additional Documentation:

## 2021-04-29 ENCOUNTER — OFFICE VISIT (OUTPATIENT)
Dept: ORTHOPEDIC SURGERY | Age: 46
End: 2021-04-29

## 2021-04-29 ENCOUNTER — HOSPITAL ENCOUNTER (OUTPATIENT)
Dept: PHYSICAL THERAPY | Age: 46
Setting detail: THERAPIES SERIES
Discharge: HOME | End: 2021-04-29
Payer: COMMERCIAL

## 2021-04-29 DIAGNOSIS — M25.562 ACUTE PAIN OF LEFT KNEE: Primary | ICD-10-CM

## 2021-04-29 DIAGNOSIS — S83.212D BUCKET-HANDLE TEAR OF MEDIAL MENISCUS OF LEFT KNEE AS CURRENT INJURY, SUBSEQUENT ENCOUNTER: ICD-10-CM

## 2021-04-29 PROCEDURE — 97530 THERAPEUTIC ACTIVITIES: CPT | Performed by: PHYSICAL THERAPIST

## 2021-04-29 PROCEDURE — 97016 VASOPNEUMATIC DEVICE THERAPY: CPT | Performed by: PHYSICAL THERAPIST

## 2021-04-29 PROCEDURE — 97110 THERAPEUTIC EXERCISES: CPT | Performed by: PHYSICAL THERAPIST

## 2021-04-29 PROCEDURE — 99024 POSTOP FOLLOW-UP VISIT: CPT | Performed by: ORTHOPAEDIC SURGERY

## 2021-04-29 PROCEDURE — 97161 PT EVAL LOW COMPLEX 20 MIN: CPT | Performed by: PHYSICAL THERAPIST

## 2021-04-29 NOTE — PLAN OF CARE
[address], [address] [place], [address]     Phone: [phone]   Fax: [phone]                                                           Physical Therapy Certification    Dear Referring Practitioner: Gasper Mayorga,    We had the pleasure of evaluating the following patient for physical therapy services at [address] [place] [address]. A summary of our findings can be found in the initial assessment below. This includes our plan of care. If you have any questions or concerns regarding these findings, please do not hesitate to contact me at the office phone number checked above. Thank you for the referral.       Physician Signature:_______________________________Date:__________________  By signing above (or electronic signature), therapists plan is approved by physician      Patient: Driss Mckeon   :    MRN: [id number]  Referring Physician: Referring Practitioner: Deondre      Evaluation Date: 2021      Medical Diagnosis Information:  Diagnosis: s/p left knee medial meniscectomy; left knee pain M25.562   Treatment Diagnosis: left knee pain- M25.562                                           Precautions/ Contra-indications: WBAT  Latex Allergy:  [x]NO      []YES  Preferred Language for Healthcare:   [x]English       []other:    SUBJECTIVE: Patient stated complaint:  Pt reports having knee pain for about 1 year. He did have some tx with PRP. However, recently, he was doing some martial arts on 21. He was throwing someone often and felt a pop. He did go to the ED. X-rays were normal.  He then f/u with ortho. He was seen in the OR for surgery yesterday. He presents today day one post op. He hasn't been taking the hydrocodone.      Relevant Medical History: broken right wrist, right thumb and left elbow  Functional Scale:   LEFS-45%    Pain Scale: 0/10  Easing factors: NA surgery yesterday  Provocative factors: NA surgery yesterday    Type: []Constant   []Intermittent  []Radiating []Localized []other:     Numbness/Tingling: none    Functional Limitations/Impairments: []Sitting [x]Standing [x]Walking    [x]Squatting/bending  [x]Stairs           [x]ADL's  [x]Transfers [x]Sports/Recreation []Other:    Occupation/School: - working remotely from home currently. Living Status/Prior Level of Function: Independent with ADLs and IADLs, jui tisu 4-5 x/wk and golf on weekends. (insert highest prior level of function)    OBJECTIVE:     Joint mobility:    []Normal    [x]Hypo   []Hyper    Palpation: deferred due to surgery, but not in calf. Functional Mobility/Transfers: limited as listed above    Posture: slight fwd head    Bandages/Dressings/Incisions: Pt's dressings were removed. No S&S of infection were noted. Pt was ed on S&S of infection, DVT, wound care, and showering.       Gait: (include devices/WB status) Pt presents NWB using bilateral crutches        Flexibility L R Comment   Hamstring      Gastroc      ITB      Quad                ROM PROM AROM Overpressure Comment    L R L R L R    Flexion 93   147      Extension   0 0                              Strength L R Comment   Quad      Hamstring      Gastroc      Hip flexor      Hip ABD                    Orthopedic Special Tests:   Special Test Results/Comment   Meniscal Click    Crepitus    Flexion Test    Valgus Laxity    Varus Laxity    Lachmans    Drop Back    Homans            Girth L R   Mid Patella     Suprapatellar     5cm above     15cm above       Wells Clinical Decision Rule for DVT    Date:  Clinical Presentation  Possible Score  Clients Score    Active cancer (within 6 months of diagnosis or receiving palliative care)  1     Paralysis, paresis, or recent immobilization of lower extremity  1     Bedridden for more than 3 days or major surgery in the last 4 weeks  1     Localized tenderness in the center of the posterior calf, the conditions   []Constipation (N02.15)   Metabolic conditions   []Morbid obesity (E66.01)  []Diabetes type 1(E10.65) or 2 (E11.65)   []Neuropathy (G60.9)     Pulmonary conditions   []Asthma (J45)  []Coughing   []COPD (J44.9)   Psychological Disorders  [x]Anxiety (F41.9)  []Depression (F32.9)   []Other:   []Other:          Barriers to/and or personal factors that will affect rehab potential:              []Age  []Sex              []Motivation/Lack of Motivation                        []Co-Morbidities              []Cognitive Function, education/learning barriers              []Environmental, home barriers              [x]profession/work barriers  []past PT/medical experience  []other:  Justification: Pt is a very active individual.  He is fit. He is busy with his job. His fitness should help with his recovery. He does live by himself, and he does have some help if he needs it and has prepared for surgery by shopping in advance and doing laundry. Pt has been tx before for this injury. Falls Risk Assessment (30 days):   [x]Falls Risk assessed and no intervention required.   [] Falls Risk assessed and Patient requires intervention due to being higher risk   TUG score (>12s at risk):     [] Falls education provided, including           ASSESSMENT:   Functional Impairments:     [x]Noted lumbar/proximal hip/LE joint hypomobility   [x]Decreased LE functional ROM   [x]Decreased core/proximal hip strength and neuromuscular control   [x]Decreased LE functional strength   [x]Reduced balance/proprioceptive control   []other:      Functional Activity Limitations (from functional questionnaire and intake)   [x]Reduced ability to tolerate prolonged functional positions   [x]Reduced ability or difficulty with changes of positions or transfers between positions   [x]Reduced ability to maintain good posture and demonstrate good body mechanics with sitting, bending, and lifting   []Reduced ability to sleep   [x]Reduced ability or tolerance with driving and/or computer work   [x]Reduced ability to perform lifting, carrying tasks   [x]Reduced ability to squat   []Reduced ability to forward bend   [x]Reduced ability to ambulate prolonged functional periods/distances/surfaces   [x]Reduced ability to ascend/descend stairs   [x]Reduced ability to run, hop, cut or jump   []other:    Participation Restrictions   [x]Reduced participation in self care activities   [x]Reduced participation in home management activities   [x]Reduced participation in work activities   [x]Reduced participation in social activities. [x]Reduced participation in sport/recreation activities. Classification :    []Signs/symptoms consistent with post-surgical status including decreased ROM, strength and function. []Signs/symptoms consistent with joint sprain/strain   []Signs/symptoms consistent with patella-femoral syndrome   []Signs/symptoms consistent with knee OA/hip OA   []Signs/symptoms consistent with internal derangement of knee/Hip   []Signs/symptoms consistent with functional hip weakness/NMR control      []Signs/symptoms consistent with tendinitis/tendinosis    []signs/symptoms consistent with pathology which may benefit from Dry needling      [x]other: Pt is a 42 y/o male presenting s/p left knee medial meniscectomy from the MD.  Clinically, the pt presents with decreased ROM, decreased strength, decreased function, and increased pain consistent with the MD diagnosis. The pt would benefit from skilled PT to return to PLOF. Pt is a very active individual.  He is fit. He is busy with his job. His fitness should help with his recovery. He does live by himself, and he does have some help if he needs it and has prepared for surgery by shopping in advance and doing laundry. Pt has been tx before for this injury. Pt's benefits were reviewed. All questions were answered. Pt is agreeable.       Prognosis/Rehab Potential: []Excellent   [x]Good    []Fair   []Poor    Tolerance of evaluation/treatment:    []Excellent   [x]Good    []Fair   []Poor    PLAN  Frequency/Duration:  1-2 days per week for 4-6 Weeks:  Interventions:  [x]  Therapeutic exercise including: strength training, ROM, for Lower extremity and core   [x]  NMR activation and proprioception for LE, Glutes and Core   [x]  Manual therapy as indicated for LE, Hip and spine to include: Dry Needling/IASTM, STM, PROM, Gr I-IV mobilizations, manipulation. [x] Modalities as needed that may include: thermal agents, E-stim, Biofeedback, US, iontophoresis as indicated  [x] Patient education on joint protection, postural re-education, activity modification, progression of HEP. HEP instruction: (see scanned forms)      GOALS:   Patient stated goal: be back to 100% and back to training    [] Progressing: [] Met: [] Not Met: [] Adjusted    Therapist goals for Patient:   Short Term Goals: To be achieved in: 2 weeks  1. Independent in HEP and progression per patient tolerance, in order to prevent re-injury. [] Progressing: [] Met: [] Not Met: [] Adjusted   2. Patient will have a decrease in pain of 3/10 at worst to facilitate improvement in movement, function, and ADLs as indicated by functional deficits. [] Progressing: [] Met: [] Not Met: [] Adjusted    Long Term Goals: To be achieved in: 6 weeks  1. Pt will demo a LEFS score of 90% or better to assist with reaching prior level of function. [] Progressing: [] Met: [] Not Met: [] Adjusted  2. Patient will demonstrate increased AROM to knee flexion to greater than or equal to 140 and knee ext to 0 to allow for proper joint functioning as indicated by patients functional deficits. [] Progressing: [] Met: [] Not Met: [] Adjusted  3. Patient will demonstrate an increase in strength of hip flex, ABD, and knee flex/ext to 4+/5 to allow for proper functional mobility as indicated by patients functional deficits.    [] Progressing: [] Met: [] Not Met: [] Adjusted  4. Patient will return to amb in the community including up/down stairs with reciprocal gt without increased symptoms or restriction. [] Progressing: [] Met: [] Not Met: [] Adjusted  5. Pt will return to working out without c/o pain/limitiations. [] Progressing: [] Met: [] Not Met: [] Adjusted         Physical Therapy Evaluation Complexity Justification  [x] A history of present problem with:  [] no personal factors and/or comorbidities that impact the plan of care;  [x]1-2 personal factors and/or comorbidities that impact the plan of care  []3 personal factors and/or comorbidities that impact the plan of care  [x] An examination of body systems using standardized tests and measures addressing any of the following: body structures and functions (impairments), activity limitations, and/or participation restrictions;:  [] a total of 1-2 or more elements   []a total of 3 or more elements   [x]a total of 4 or more elements   [x] A clinical presentation with:  [x] stable and/or uncomplicated characteristics   [] evolving clinical presentation with changing characteristics  [] unstable and unpredictable characteristics;   [x] Clinical decision making of [] low, [] moderate, [] high complexity using standardized patient assessment instrument and/or measurable assessment of functional outcome.     []EVAL (LOW) 30926 (typically 20 minutes face-to-face)  []EVAL (MOD) 75029 (typically 30 minutes face-to-face)  []EVAL (HIGH) 52528 (typically 45 minutes face-to-face)  []RE-EVAL 30338    Electronically signed by:  Stacey Wiley, PT, DPT [id number]

## 2021-04-29 NOTE — FLOWSHEET NOTE
[address], [facility] [address]Z                                                     Physical Therapy Treatment Note/ Progress Report:           Date:  2021    Patient Name:  Catrina Gonzalez    :    MRN: [id number]  Restrictions/Precautions:    Medical/Treatment Diagnosis Information:  · Diagnosis: s/p left knee medial meniscectomy; left knee pain M25.562.   Surgery on   · Treatment Diagnosis: left knee pain- I67.204  Insurance/Certification information:  PT Insurance Information: Jo-Ann  Physician Information:  Referring Practitioner: Deondre  Has the plan of care been signed (Y/N):        []  Yes  [x]  No     Date of Patient follow up with Physician:       Is this a Progress Report:     []  Yes  [x]  No        If Yes:  Date Range for reporting period:  Beginning   Ending    Progress report will be due (10 Rx or 30 days whichever is less): visit 10 or 26 May 21      Recertification will be due (POC Duration  / 90 days whichever is less): see above         Visit # Insurance Allowable Auth Required   1 60 []  Yes []  No        Functional Scale: LEFS-45%   Date assessed: 2021    Latex Allergy:  [x]NO      []YES  Preferred Language for Healthcare:   [x]English       []other:      Pain level:  See eval     SUBJECTIVE:  See eval    OBJECTIVE: See eval   Observation:    Test measurements:      Flexibility L R Comment   Hamstring      Gastroc      ITB      Quad                ROM PROM AROM Overpressure Comment    L R L R L R    Flexion          Extension                                  Strength L R Comment   Quad      Hamstring      Gastroc      Hip flexor      Hip ABD                      Special Test Results/Comment   Meniscal Click    Crepitus    Flexion Test    Valgus Laxity    Varus Laxity    Lachmans    Drop Back    Homans            Girth L R   Mid Patella     Suprapatellar     5cm above     15cm above       Marco Clinical Decision Rule for DVT    Clinical Presentation  Possible Score  Clients Score    Active cancer (within 6 months of diagnosis or receiving palliative care)  1     Paralysis, paresis, or recent immobilization of lower extremity  1     Bedridden for more than 3 days or major surgery in the last 4 weeks  1     Localized tenderness in the center of the posterior calf, the popliteal space, or along the femoral vein in the anterior thigh/groin  1     Entire lower extremity swelling  1     Unilateral calf swelling (more than 3 cm larger than uninvolved side)  1     Unilateral pitting edema  1     Collateral superficial veins (nonvaricose)  1     An alternative diagnosis is as likely (or more likely) than DVT (e.g., cellulitis, postoperative swelling, calf strain)  -2     Total Points   -2     Key  · -2 to 0 Low probability of DVT 3% (95% confidence interval [CI] 1.7%-5.9%)  · 1 to 2 Moderate probability of DVT 17% (95% confidence interval [CI] 12%-23%)  · 3 or more High probability of DVT 75% (95% confidence interval [CI] 63%-84%)    Medical consultation is advised in the presence of low probability  Medical referral is required with moderate or high score. Marco PS, Ramses DR, Ayan J, et al: Value of assessment of pretest probability of deep-vein thrombosis in clinical management, Lancet 455:8185-4797, 1997.       RESTRICTIONS/PRECAUTIONS: WBAT    Exercises/Interventions:     Exercise/Equipment Repetitions/Resistance Other comments   Stretching     Hamstring 5x:30    Hip Flexion     ITB- Rope     Grion     Quad     Inclined Calf     Towel Pull 5x:30    Piriformis                    SLR     Supine 3x10    Prone     Abduction 3x10    Adducton     SLR+          Isometrics     Quad sets 10x:10    Ball Squeezes 10x:10    Patellar Glides     Medial     Superior     Inferior          ROM     Passive     Active     Weight Shift     Hang Weights     Sheet Pulls 10x:10    Ankle LE with self care and ADLs  [x] (93482) Gait Re-education- Provided training and instruction to the patient for proper LE, core and proximal hip recruitment and positioning and eccentric body weight control with ambulation re-education including up and down stairs     Home Exercise Program:    [x] (17830) Reviewed/Progressed HEP activities related to strengthening, flexibility, endurance, ROM of core, proximal hip and LE for functional self-care, mobility, lifting and ambulation/stair navigation   [x] (35672)Reviewed/Progressed HEP activities related to improving balance, coordination, kinesthetic sense, posture, motor skill, proprioception of core, proximal hip and LE for self care, mobility, lifting, and ambulation/stair navigation      Manual Treatments:  PROM / STM / Oscillations-Mobs:  G-I, II, III, IV (PA's, Inf., Post.)  [x](37024) Provided manual therapy to mobilize LE, proximal hip and/or LS spine soft tissue/joints for the purpose of modulating pain, promoting relaxation,  increasing ROM, reducing/eliminating soft tissue swelling/inflammation/restriction, improving soft tissue extensibility and allowing for proper ROM for normal function with self care, mobility, lifting and ambulation. Other:  Patient education on PT and plan of care including diagnosis, prognosis, treatment goals and options. Patient agrees with discussed POC and treatment and is aware of rehab process. Pt was also educated on clinic layout and use of modalities. PT gave pt business card to call if any questions. Pt was ed on WB, AD use, showering, wound care, and protocol. Pt was also ed on S&S of DVT and infection and what to do if these are experienced.         Modalities:  Vaso medium pressure-15'    Charges:   Timed Code Treatment Minutes: 25'   Total Treatment Minutes: 72'       [x]EVAL (LOW) 455 1011   [] EVAL (MOD) 37468   []  EVAL (HIGH) 02139   []  RE-EVAL   [x]  DB(16496) x1     []IONTO  []  NMR (14058) x     [x]  VASO  [] Manual (67612) x      [] Other: DN not covered  [x]  TA x  1    [] Mech Traction (77435)  []  ES(attended) (40920)      []ES (un) (09478):     GOALS:   Patient stated goal: be back to 100% and back to training    []? Progressing: []? Met: []? Not Met: []? Adjusted     Therapist goals for Patient:   Short Term Goals: To be achieved in: 2 weeks  1. Independent in HEP and progression per patient tolerance, in order to prevent re-injury. []? Progressing: []? Met: []? Not Met: []? Adjusted   2. Patient will have a decrease in pain of 3/10 at worst to facilitate improvement in movement, function, and ADLs as indicated by functional deficits. []? Progressing: []? Met: []? Not Met: []? Adjusted     Long Term Goals: To be achieved in: 6 weeks  1. Pt will demo a LEFS score of 90% or better to assist with reaching prior level of function. []? Progressing: []? Met: []? Not Met: []? Adjusted  2. Patient will demonstrate increased AROM to knee flexion to greater than or equal to 140 and knee ext to 0 to allow for proper joint functioning as indicated by patients functional deficits. []? Progressing: []? Met: []? Not Met: []? Adjusted  3. Patient will demonstrate an increase in strength of hip flex, ABD, and knee flex/ext to 4+/5 to allow for proper functional mobility as indicated by patients functional deficits. []? Progressing: []? Met: []? Not Met: []? Adjusted  4. Patient will return to amb in the community including up/down stairs with reciprocal gt without increased symptoms or restriction. []? Progressing: []? Met: []? Not Met: []? Adjusted  5. Pt will return to working out without c/o pain/limitiations. []? Progressing: []? Met: []? Not Met: []? Adjusted          Overall Progression Towards Functional goals/ Treatment Progress Update:  [] Patient is progressing as expected towards functional goals listed. [] Progression is slowed due to complexities/Impairments listed.   [] Progression has been slowed due to co-morbidities. [x] Plan just implemented, too soon to assess goals progression <30days   [] Goals require adjustment due to lack of progress  [] Patient is not progressing as expected and requires additional follow up with physician  [] Other    Prognosis for POC: [x] Good [] Fair  [] Poor      Patient requires continued skilled intervention: [x] Yes  [] No    Treatment/Activity Tolerance:  [x] Patient able to complete treatment  [] Patient limited by fatigue  [] Patient limited by pain     [] Patient limited by other medical complications  [] Other: Pt is a 42 y/o male presenting s/p left knee medial meniscectomy from the MD.  Clinically, the pt presents with decreased ROM, decreased strength, decreased function, and increased pain consistent with the MD diagnosis. The pt would benefit from skilled PT to return to PLOF. Pt is a very active individual.  He is fit. He is busy with his job. His fitness should help with his recovery. He does live by himself, and he does have some help if he needs it and has prepared for surgery by shopping in advance and doing laundry. Pt has been tx before for this injury. Pt's benefits were reviewed. All questions were answered. Pt is agreeable. PLAN: If pt doesn't return, this note can be considered a D/C note.   See eval  [] Continue per plan of care [] Alter current plan (see comments above)  [x] Plan of care initiated [] Hold pending MD visit [] Discharge  Electronically signed by: Zoila Singer DPT [id number]

## 2021-04-29 NOTE — PROGRESS NOTES
Isabella Dennis returns today 1 day status post left knee arthroscopy with partial medial meniscectomy for a bucket-handle tear. It was irreparable. He is doing well. He has noticed significant improvement already. Today, incisions look good without infection. Calf is soft without DVT. Changed his bandages and placed new Steri-Strips and Tegaderm. He will continue with rehab and follow-up with me in a week for suture removal.        I have reviewed the patient's medical history in detail and updated the computerized patient record. This note was created using voice recognition software. It has been proofread, but occasionally errors remain. Please disregard these errors. They will be corrected as they are noted.

## 2021-05-03 ENCOUNTER — HOSPITAL ENCOUNTER (OUTPATIENT)
Dept: PHYSICAL THERAPY | Age: 46
Setting detail: THERAPIES SERIES
Discharge: HOME | End: 2021-05-03
Payer: COMMERCIAL

## 2021-05-03 PROCEDURE — 97110 THERAPEUTIC EXERCISES: CPT | Performed by: PHYSICAL THERAPIST

## 2021-05-03 PROCEDURE — 97112 NEUROMUSCULAR REEDUCATION: CPT | Performed by: PHYSICAL THERAPIST

## 2021-05-03 PROCEDURE — 97530 THERAPEUTIC ACTIVITIES: CPT | Performed by: PHYSICAL THERAPIST

## 2021-05-03 NOTE — FLOWSHEET NOTE
[address] [facility],[address], [facility] [address]                     Physical Therapy Treatment Note/ Progress Report:         Date:  5/3/2021    Patient Name:  Xiang Rasmussen    :    MRN: [id number]  Restrictions/Precautions:    Medical/Treatment Diagnosis Information:  · Diagnosis: s/p left knee medial meniscectomy; left knee pain M25.562. Surgery on   · Treatment Diagnosis: left knee pain- Z18.836  Insurance/Certification information:  PT Insurance Information: Jo-Ann  Physician Information:  Referring Practitioner: Cortez Bolaños  Has the plan of care been signed (Y/N):        []  Yes  [x]  No     Date of Patient follow up with Physician:       Is this a Progress Report:     []  Yes  [x]  No      Progress report/ Recertification will be due (10 Rx or 30 days whichever is less): visit 10 or 26 May 21        Visit # Insurance Allowable Auth Required   2 60 []  Yes []  No        Functional Scale: LEFS-45%   Date assessed: 2021    Latex Allergy:  [x]NO      []YES  Preferred Language for Healthcare:   [x]English       []other:      Pain level: 2/10 5/3    SUBJECTIVE:  States he didn't have pain sleeping. He states he rested a lot and did the exercises. He feels like he can put more weight on it. He states he feels like the exercises are getting better.  3    OBJECTIVE: See eval   Observation:    Test measurements:      ROM 5/3  L knee flex - 130 deg   L knee ext - 0 deg     RESTRICTIONS/PRECAUTIONS: WBAT    Exercises/Interventions:     Exercise/Equipment Repetitions/Resistance Other comments   Stretching     Hamstring 5x:30    Hip Flexion     ITB- Rope     Grion     Quad     Inclined Calf     Towel Pull 5x:30    Piriformis                    SLR     Supine 3x10 1# ^5/3   Prone 3 x 10 1# ^5/3   Abduction 3x10 1# ^5/3   Adducton 3 x 10 1# ^5/3   SLR+ 10 sec x 10  Added 5/3        Isometrics     Quad sets 10x:10    Severiano Cocks Squeezes          Patellar Glides     Medial     Superior     Inferior          ROM     Weight Shift     Hang Weights     Sheet Pulls 10x:10    Ankle Pumps          CKC     Calf raises 10 x 3  Added 5/3   Wall sits     Step ups     1 leg stand 30 sec x 5  Added 5/3   Squatting     CC TKE     Balance     Monster Walks     Bridging     Triple threats     Stool Scoots     Biodex - weight shift 100% LLE, 4 min Added 5/3        PRE     Extension  RANGE:   Flexion  RANGE:        Cable Column          Leg Press  RANGE:        Bike     Treadmill          Gait Cone walking 10' x 6 with one crutch  35' x 4 one crutch   35' x 4 independent   8'           Access Code: [email or web address]  URL: [email or web address]  Date: 04/29/2021  Prepared by: Kylee Stuart    Exercises  Seated Table Hamstring Stretch - 2 x daily - 7 x weekly - 5 sets - 30 hold  Seated Gastroc Stretch with Strap - 2 x daily - 7 x weekly - 5 sets - 30 hold  Supine Quadricep Sets - 10 x daily - 7 x weekly - 10 sets - 1 reps - 10 hold  Supine Ankle Pumps - 10 x daily - 7 x weekly - 3 sets - 10 reps  Supine Straight Leg Raises - 2 x daily - 7 x weekly - 3 sets - 10 reps  Sidelying Hip Abduction - 2 x daily - 7 x weekly - 3 sets - 10 reps  Supine Hip Adduction Isometric with Ball - 1 x daily - 7 x weekly - 10 sets - 10 hold  Supine Heel Slide with Strap - 2 x daily - 7 x weekly - 10 sets - 10 hold    Therapeutic Exercise and NMR EXR  [x](08996) Provided verbal/tactile cueing for activities related to strengthening, flexibility, endurance, ROM for improvements in LE, proximal hip, and core control with self care, mobility, lifting, ambulation. [x] (86774) Provided verbal/tactile cueing for activities related to improving balance, coordination, kinesthetic sense, posture, motor skill, proprioception  to assist with LE, proximal hip, and core control in self care, mobility, lifting, ambulation and eccentric single leg control.      NMR and Therapeutic Activities: 5/3    Charges:   Timed Code Treatment Minutes: 39'    Total Treatment Minutes: 50'        []EVAL (LOW) 455 1011   [] EVAL (MOD) 83040   []  EVAL (HIGH) 85045   []  RE-EVAL   [x]  CI(79842) x1     []IONTO  [x]  NMR (59863) x1     []  VASO  []  Manual (34913) x      [] Other: DN not covered  [x]  TA x  1    [] Mech Traction (51085)  []  ES(attended) (17082)      []ES (un) (09638):     GOALS:   Patient stated goal: be back to 100% and back to training    []? Progressing: []? Met: []? Not Met: []? Adjusted     Therapist goals for Patient:   Short Term Goals: To be achieved in: 2 weeks  1. Independent in HEP and progression per patient tolerance, in order to prevent re-injury. []? Progressing: []? Met: []? Not Met: []? Adjusted   2. Patient will have a decrease in pain of 3/10 at worst to facilitate improvement in movement, function, and ADLs as indicated by functional deficits. []? Progressing: []? Met: []? Not Met: []? Adjusted     Long Term Goals: To be achieved in: 6 weeks  1. Pt will demo a LEFS score of 90% or better to assist with reaching prior level of function. []? Progressing: []? Met: []? Not Met: []? Adjusted  2. Patient will demonstrate increased AROM to knee flexion to greater than or equal to 140 and knee ext to 0 to allow for proper joint functioning as indicated by patients functional deficits. []? Progressing: []? Met: []? Not Met: []? Adjusted  3. Patient will demonstrate an increase in strength of hip flex, ABD, and knee flex/ext to 4+/5 to allow for proper functional mobility as indicated by patients functional deficits. []? Progressing: []? Met: []? Not Met: []? Adjusted  4. Patient will return to amb in the community including up/down stairs with reciprocal gt without increased symptoms or restriction. []? Progressing: []? Met: []? Not Met: []? Adjusted  5. Pt will return to working out without c/o pain/limitiations. []? Progressing: []? Met: []? Not Met: []?  Adjusted          Overall Progression Towards Functional goals/ Treatment Progress Update:  [x] Patient is progressing as expected towards functional goals listed. [] Progression is slowed due to complexities/Impairments listed. [] Progression has been slowed due to co-morbidities. [] Plan just implemented, too soon to assess goals progression <30days   [] Goals require adjustment due to lack of progress  [] Patient is not progressing as expected and requires additional follow up with physician  [] Other    Prognosis for POC: [x] Good [] Fair  [] Poor      Patient requires continued skilled intervention: [x] Yes  [] No    Treatment/Activity Tolerance:  [x] Patient able to complete treatment  [] Patient limited by fatigue  [] Patient limited by pain     [] Patient limited by other medical complications  [] Other: Pt grace session well. He demonstrated improved knee flex ROM. He grace progression of SLRs. Progressed pt to 100% WB pain free. Pt was lacking full knee ext at contact with ambulation, which was improved with cone walking. Pt was educated to ambulate independently at home, but to continue using one crutch for community ambulation. Pt will follow-up with primary PT at new location next appt. 5/3    PLAN: If pt doesn't return, this note can be considered a D/C note.   See eval  [] Continue per plan of care [] Alter current plan (see comments above)  [x] Plan of care initiated [] Hold pending MD visit [] Discharge    Electronically signed by: Char Costa PT, DPT

## 2021-05-07 ENCOUNTER — OFFICE VISIT (OUTPATIENT)
Dept: ORTHOPEDIC SURGERY | Age: 46
End: 2021-05-07

## 2021-05-07 ENCOUNTER — HOSPITAL ENCOUNTER (OUTPATIENT)
Dept: PHYSICAL THERAPY | Age: 46
Setting detail: THERAPIES SERIES
Discharge: HOME | End: 2021-05-07
Payer: COMMERCIAL

## 2021-05-07 DIAGNOSIS — S83.212D BUCKET-HANDLE TEAR OF MEDIAL MENISCUS OF LEFT KNEE AS CURRENT INJURY, SUBSEQUENT ENCOUNTER: Primary | ICD-10-CM

## 2021-05-07 PROCEDURE — 97530 THERAPEUTIC ACTIVITIES: CPT | Performed by: PHYSICAL THERAPIST

## 2021-05-07 PROCEDURE — 99024 POSTOP FOLLOW-UP VISIT: CPT | Performed by: ORTHOPAEDIC SURGERY

## 2021-05-07 PROCEDURE — 97112 NEUROMUSCULAR REEDUCATION: CPT | Performed by: PHYSICAL THERAPIST

## 2021-05-07 PROCEDURE — 97110 THERAPEUTIC EXERCISES: CPT | Performed by: PHYSICAL THERAPIST

## 2021-05-07 NOTE — FLOWSHEET NOTE
100 [facility] a Department of [address], [address]  Office: [phone]  Fax:  [phone]          Date:  2021    Patient Name:  Jaleesa Lehman    :  [id number]  MRN: [id number]  Restrictions/Precautions:    Medical/Treatment Diagnosis Information:  · Diagnosis: s/p left knee medial meniscectomy; left knee pain M25.562. Surgery on   · Treatment Diagnosis: left knee pain- Q86.643  Insurance/Certification information:  PT Insurance Information: Jo-Ann  Physician Information:  Referring Practitioner: Kalin Alcala  Has the plan of care been signed (Y/N):        []  Yes  [x]  No     Date of Patient follow up with Physician:       Is this a Progress Report:     []  Yes  [x]  No      Progress report/ Recertification will be due (10 Rx or 30 days whichever is less): visit 10 or 26 May 21        Visit # Insurance Allowable Auth Required   3 60 []  Yes []  No        Functional Scale: LEFS-45%   Date assessed: 2021    Latex Allergy:  [x]NO      []YES  Preferred Language for Healthcare:   [x]English       []other:      Pain level: 0/10    SUBJECTIVE:  He is getting around the house fine. He is getting in/out of the shower fine. He is able to balance without c/o.      OBJECTIVE: 7 May 21    Observation:    Test measurements:      ROM PROM AROM Overpressure Comment     L R L R L R     Flexion      136          Extension      0                                                    Strength L R Comment   Quad        Hamstring        Gastroc        Hip flexor        Hip ABD                                      RESTRICTIONS/PRECAUTIONS: WBAT    Exercises/Interventions:     Exercise/Equipment Repetitions/Resistance Other comments   Stretching     Hamstring 5x:30    Hip Flexion     ITB- Rope     Grion     Quad Attempted but caused some pain    Inclined Calf 5x:30    Towel Pull     Piriformis SLR     Supine 3x10 1#    Prone 3x10 1#    Abduction 3x10 1#    Adducton 3x10 1#    SLR+ 5x:15    clams 3x10 4#    SLR + ABD 3x:30         Isometrics     Quad sets     Ball Squeezes          Patellar Glides     Medial     Superior     Inferior          ROM     Weight Shift     Hang Weights     Sheet Pulls 10x:10    Ankle Pumps          CKC     Calf raises 3x10 SL    Wall sits     Step ups     1 leg stand 30 sec x 5     Squatting     CC TKE     Balance     Monster Walks     Bridging     Triple threats     Stool Scoots     Biodex - weight shift      PRE     Extension  RANGE:   Flexion 3x10  RANGE: standing. Progress NV. Cable Column          Leg Press 10x:10  RANGE: 70 deg        Bike 5' L1    Treadmill          Gait In clinic 120'  Working on [employer] Code: [email or web address]  URL: [email or web address]  Date: 04/29/2021  Prepared by: Myrna Stuart    Exercises  Seated Table Hamstring Stretch - 2 x daily - 7 x weekly - 5 sets - 30 hold  Seated Gastroc Stretch with Strap - 2 x daily - 7 x weekly - 5 sets - 30 hold  Supine Quadricep Sets - 10 x daily - 7 x weekly - 10 sets - 1 reps - 10 hold  Supine Ankle Pumps - 10 x daily - 7 x weekly - 3 sets - 10 reps  Supine Straight Leg Raises - 2 x daily - 7 x weekly - 3 sets - 10 reps  Sidelying Hip Abduction - 2 x daily - 7 x weekly - 3 sets - 10 reps  Supine Hip Adduction Isometric with Ball - 1 x daily - 7 x weekly - 10 sets - 10 hold  Supine Heel Slide with Strap - 2 x daily - 7 x weekly - 10 sets - 10 hold    Therapeutic Exercise and NMR EXR  [x](06314) Provided verbal/tactile cueing for activities related to strengthening, flexibility, endurance, ROM for improvements in LE, proximal hip, and core control with self care, mobility, lifting, ambulation.   [x] (05977) Provided verbal/tactile cueing for activities related to improving balance, coordination, kinesthetic sense, posture, motor skill, proprioception  to assist with LE, proximal hip, and core control in self care, mobility, lifting, ambulation and eccentric single leg control. NMR and Therapeutic Activities:    [x] (71664 or 56087) Provided verbal/tactile cueing for activities related to improving balance, coordination, kinesthetic sense, posture, motor skill, proprioception and motor activation to allow for proper function of core, proximal hip and LE with self care and ADLs  [x] (04948) Gait Re-education- Provided training and instruction to the patient for proper LE, core and proximal hip recruitment and positioning and eccentric body weight control with ambulation re-education including up and down stairs     Home Exercise Program:    [x] (01712) Reviewed/Progressed HEP activities related to strengthening, flexibility, endurance, ROM of core, proximal hip and LE for functional self-care, mobility, lifting and ambulation/stair navigation   [x] (56822)Reviewed/Progressed HEP activities related to improving balance, coordination, kinesthetic sense, posture, motor skill, proprioception of core, proximal hip and LE for self care, mobility, lifting, and ambulation/stair navigation      Manual Treatments:  PROM / STM / Oscillations-Mobs:  G-I, II, III, IV (PA's, Inf., Post.)  [x](27823) Provided manual therapy to mobilize LE, proximal hip and/or LS spine soft tissue/joints for the purpose of modulating pain, promoting relaxation,  increasing ROM, reducing/eliminating soft tissue swelling/inflammation/restriction, improving soft tissue extensibility and allowing for proper ROM for normal function with self care, mobility, lifting and ambulation.      Other:        Modalities:  Decline    Charges:   Timed Code Treatment Minutes: 39'    Total Treatment Minutes: 54'        []EVAL (LOW) 455 1011   [] EVAL (MOD) 84587   []  EVAL (HIGH) 52178   []  RE-EVAL   [x]  XE(22184) x1     []IONTO  [x]  NMR (73491) x1     []  VASO  []  Manual (11538) x      [] Other: DN not covered  [x]  TA x  1    [] Mech Traction (83726)  []  ES(attended) (75914)      []ES (un) (05669):     GOALS:   Patient stated goal: be back to 100% and back to training    []? Progressing: []? Met: []? Not Met: []? Adjusted     Therapist goals for Patient:   Short Term Goals: To be achieved in: 2 weeks  1. Independent in HEP and progression per patient tolerance, in order to prevent re-injury. []? Progressing: []? Met: []? Not Met: []? Adjusted   2. Patient will have a decrease in pain of 3/10 at worst to facilitate improvement in movement, function, and ADLs as indicated by functional deficits. []? Progressing: []? Met: []? Not Met: []? Adjusted     Long Term Goals: To be achieved in: 6 weeks  1. Pt will demo a LEFS score of 90% or better to assist with reaching prior level of function. []? Progressing: []? Met: []? Not Met: []? Adjusted  2. Patient will demonstrate increased AROM to knee flexion to greater than or equal to 140 and knee ext to 0 to allow for proper joint functioning as indicated by patients functional deficits. []? Progressing: []? Met: []? Not Met: []? Adjusted  3. Patient will demonstrate an increase in strength of hip flex, ABD, and knee flex/ext to 4+/5 to allow for proper functional mobility as indicated by patients functional deficits. []? Progressing: []? Met: []? Not Met: []? Adjusted  4. Patient will return to amb in the community including up/down stairs with reciprocal gt without increased symptoms or restriction. []? Progressing: []? Met: []? Not Met: []? Adjusted  5. Pt will return to working out without c/o pain/limitiations. []? Progressing: []? Met: []? Not Met: []? Adjusted          Overall Progression Towards Functional goals/ Treatment Progress Update:  [x] Patient is progressing as expected towards functional goals listed. [] Progression is slowed due to complexities/Impairments listed. [] Progression has been slowed due to co-morbidities.   [] Plan just implemented, too soon to assess goals progression <30days   [] Goals require adjustment due to lack of progress  [] Patient is not progressing as expected and requires additional follow up with physician  [] Other    Prognosis for POC: [x] Good [] Fair  [] Poor      Patient requires continued skilled intervention: [x] Yes  [] No    Treatment/Activity Tolerance:  [x] Patient able to complete treatment  [] Patient limited by fatigue  [] Patient limited by pain     [] Patient limited by other medical complications  [] Other: Pt grace session well. We did progress several exercises, which went well. He did have some pain with quad stretch so we stayed with the sheet pulls. I told him that he could go without the crutch unless he has pain, limping, or swelling. If he is walking for prolonged distances, he may want to use the one crutch. Pt would continue to benefit from skilled PT to improve strength, ROM, and function. PLAN: If pt doesn't return, this note can be considered a D/C note. Consider hamstring curls, progressing leg press, progressing SLR.   [x] Continue per plan of care [] Alter current plan (see comments above)  [] Plan of care initiated [] Hold pending MD visit [] Discharge    Electronically signed by: Eli Etienne DPT [id number]

## 2021-05-07 NOTE — PROGRESS NOTES
Mariano Aragon returns today a week out from partial medial meniscectomy for bucket-handle tear of the left knee. He is doing well and reports no pain. He is making good progress in therapy. Today, left knee shows no effusion. Her incisions are healing nicely. I removed his sutures and placed new Steri-Strips and Tegaderm. Left calf is soft. He will continue with his rehab follow-up with me in 3 weeks. I have reviewed the patient's medical history in detail and updated the computerized patient record. This note was created using voice recognition software. It has been proofread, but occasionally errors remain. Please disregard these errors. They will be corrected as they are noted.

## 2021-05-11 ENCOUNTER — HOSPITAL ENCOUNTER (OUTPATIENT)
Dept: PHYSICAL THERAPY | Age: 46
Setting detail: THERAPIES SERIES
Discharge: HOME | End: 2021-05-11
Payer: COMMERCIAL

## 2021-05-11 PROCEDURE — 97530 THERAPEUTIC ACTIVITIES: CPT | Performed by: PHYSICAL THERAPIST

## 2021-05-11 PROCEDURE — 97110 THERAPEUTIC EXERCISES: CPT | Performed by: PHYSICAL THERAPIST

## 2021-05-11 PROCEDURE — 97112 NEUROMUSCULAR REEDUCATION: CPT | Performed by: PHYSICAL THERAPIST

## 2021-05-11 NOTE — FLOWSHEET NOTE
100 [facility] a Department of [address]  [address], [address]  Office: [phone]  Fax:  [phone]          Date:  2021    Patient Name:  Xiang Rasmussen    :  [id number]  MRN: [id number]  Restrictions/Precautions:    Medical/Treatment Diagnosis Information:  · Diagnosis: s/p left knee medial meniscectomy; left knee pain M25.562. Surgery on   · Treatment Diagnosis: left knee pain- T89.867  Insurance/Certification information:  PT Insurance Information: Jo-Ann  Physician Information:  Referring Practitioner: Cortez Bolaños  Has the plan of care been signed (Y/N):        [x]  Yes  []  No     Date of Patient follow up with Physician:       Is this a Progress Report:     []  Yes  [x]  No      Progress report/ Recertification will be due (10 Rx or 30 days whichever is less): visit 10 or 26 May 21        Visit # Insurance Allowable Auth Required   4 60 []  Yes []  No        Functional Scale: LEFS-45%   Date assessed: 2021    Latex Allergy:  [x]NO      []YES  Preferred Language for Healthcare:   [x]English       []other:      Pain level: 0/10    SUBJECTIVE:  He is feeling pretty good. He has been going to the gym and working out.       OBJECTIVE: 11 May 21    Observation:    Test measurements:      ROM PROM AROM Overpressure Comment     L R L R L R     Flexion      134          Extension                                                          Strength L R Comment   Quad        Hamstring        Gastroc        Hip flexor        Hip ABD                                      RESTRICTIONS/PRECAUTIONS: WBAT    Exercises/Interventions:     Exercise/Equipment Repetitions/Resistance Other comments   Stretching     Hamstring 5x:30    Hip Flexion     ITB- Rope     Grion     Quad     Inclined Calf 5x:30    Towel Pull     Piriformis                    SLR     Supine 3x10 2#    Prone 3x10 2#    Abduction 3x10 2# Adducton 3x10 2#    SLR+ 5x:20    clams 3x10 6# Progress NV   SLR + ABD 3x:30         Isometrics     Quad sets     Ball Squeezes          Patellar Glides     Medial     Superior     Inferior          ROM     Weight Shift     Hang Weights     Sheet Pulls 10x:10    Ankle Pumps          CKC     Calf raises 3x10 SL    Wall sits 5x:30    Step ups     1 leg stand 30 sec x 5 Airex    Squatting 3x10    CC TKE     Balance     Monster Walks     Bridging     Triple threats     Stool Scoots     Biodex - weight shift NV? PRE     Extension  RANGE:   Flexion 3x10 25# + wt RANGE: standing. Progress NV. Cable Column          Leg Press 3x10 90# RANGE: 70 deg-10 deg. Progress NV        Bike 7' L1    Treadmill          Gait In clinic 120'  Working on [employer]           Access Code: [email or web address]  URL: [email or web address]  Date: 04/29/2021  Prepared by: Tatiana Stuart    Exercises  Seated Table Hamstring Stretch - 2 x daily - 7 x weekly - 5 sets - 30 hold  Seated Gastroc Stretch with Strap - 2 x daily - 7 x weekly - 5 sets - 30 hold  Supine Quadricep Sets - 10 x daily - 7 x weekly - 10 sets - 1 reps - 10 hold  Supine Ankle Pumps - 10 x daily - 7 x weekly - 3 sets - 10 reps  Supine Straight Leg Raises - 2 x daily - 7 x weekly - 3 sets - 10 reps  Sidelying Hip Abduction - 2 x daily - 7 x weekly - 3 sets - 10 reps  Supine Hip Adduction Isometric with Ball - 1 x daily - 7 x weekly - 10 sets - 10 hold  Supine Heel Slide with Strap - 2 x daily - 7 x weekly - 10 sets - 10 hold    Therapeutic Exercise and NMR EXR  [x](00719) Provided verbal/tactile cueing for activities related to strengthening, flexibility, endurance, ROM for improvements in LE, proximal hip, and core control with self care, mobility, lifting, ambulation.   [x] (17314) Provided verbal/tactile cueing for activities related to improving balance, coordination, kinesthetic sense, posture, motor skill, proprioception  to assist with LE, proximal hip, and core control in self care, mobility, lifting, ambulation and eccentric single leg control. NMR and Therapeutic Activities:    [x] (74302 or 05201) Provided verbal/tactile cueing for activities related to improving balance, coordination, kinesthetic sense, posture, motor skill, proprioception and motor activation to allow for proper function of core, proximal hip and LE with self care and ADLs  [x] (94219) Gait Re-education- Provided training and instruction to the patient for proper LE, core and proximal hip recruitment and positioning and eccentric body weight control with ambulation re-education including up and down stairs     Home Exercise Program:    [x] (11833) Reviewed/Progressed HEP activities related to strengthening, flexibility, endurance, ROM of core, proximal hip and LE for functional self-care, mobility, lifting and ambulation/stair navigation   [x] (58313)Reviewed/Progressed HEP activities related to improving balance, coordination, kinesthetic sense, posture, motor skill, proprioception of core, proximal hip and LE for self care, mobility, lifting, and ambulation/stair navigation      Manual Treatments:  PROM / STM / Oscillations-Mobs:  G-I, II, III, IV (PA's, Inf., Post.)  [x](10493) Provided manual therapy to mobilize LE, proximal hip and/or LS spine soft tissue/joints for the purpose of modulating pain, promoting relaxation,  increasing ROM, reducing/eliminating soft tissue swelling/inflammation/restriction, improving soft tissue extensibility and allowing for proper ROM for normal function with self care, mobility, lifting and ambulation.      Other:        Modalities:  Decline    Charges:   Timed Code Treatment Minutes: 40'    Total Treatment Minutes: 50'        []EVAL (LOW) 455 1011   [] EVAL (MOD) 17732   []  EVAL (HIGH) 37041   []  RE-EVAL   [x]  AJ(99125) x1     []IONTO  [x]  NMR (21615) x1     []  VASO  []  Manual (06027) x      [] Other: DN not covered  [x]  TA x  1    [] Mech Traction (84924)  []  ES(attended) (01333)      []ES (un) (55748):     GOALS:   Patient stated goal: be back to 100% and back to training    []? Progressing: []? Met: []? Not Met: []? Adjusted     Therapist goals for Patient:   Short Term Goals: To be achieved in: 2 weeks  1. Independent in HEP and progression per patient tolerance, in order to prevent re-injury. []? Progressing: []? Met: []? Not Met: []? Adjusted   2. Patient will have a decrease in pain of 3/10 at worst to facilitate improvement in movement, function, and ADLs as indicated by functional deficits. []? Progressing: []? Met: []? Not Met: []? Adjusted     Long Term Goals: To be achieved in: 6 weeks  1. Pt will demo a LEFS score of 90% or better to assist with reaching prior level of function. []? Progressing: []? Met: []? Not Met: []? Adjusted  2. Patient will demonstrate increased AROM to knee flexion to greater than or equal to 140 and knee ext to 0 to allow for proper joint functioning as indicated by patients functional deficits. []? Progressing: []? Met: []? Not Met: []? Adjusted  3. Patient will demonstrate an increase in strength of hip flex, ABD, and knee flex/ext to 4+/5 to allow for proper functional mobility as indicated by patients functional deficits. []? Progressing: []? Met: []? Not Met: []? Adjusted  4. Patient will return to amb in the community including up/down stairs with reciprocal gt without increased symptoms or restriction. []? Progressing: []? Met: []? Not Met: []? Adjusted  5. Pt will return to working out without c/o pain/limitiations. []? Progressing: []? Met: []? Not Met: []? Adjusted          Overall Progression Towards Functional goals/ Treatment Progress Update:  [x] Patient is progressing as expected towards functional goals listed. [] Progression is slowed due to complexities/Impairments listed. [] Progression has been slowed due to co-morbidities.   [] Plan just implemented, too soon to assess goals progression <30days   [] Goals require adjustment due to lack of progress  [] Patient is not progressing as expected and requires additional follow up with physician  [] Other    Prognosis for POC: [x] Good [] Fair  [] Poor      Patient requires continued skilled intervention: [x] Yes  [] No    Treatment/Activity Tolerance:  [x] Patient able to complete treatment  [] Patient limited by fatigue  [] Patient limited by pain     [] Patient limited by other medical complications  [] Other: Pt grace session well. He is progressing appropriately. He was able to progress PREs well without c/o. He was challenged by the wall sits. He demo good form with the minisquats. Pt would continue to benefit from skilled PT to improve strength, ROM, and function. PLAN: If pt doesn't return, this note can be considered a D/C note. Consider lateral band walking and minisquats on biodex.   [x] Continue per plan of care [] Alter current plan (see comments above)  [] Plan of care initiated [] Hold pending MD visit [] Discharge    Electronically signed by: Chrystal Nuñez DPT [id number]

## 2021-05-14 ENCOUNTER — HOSPITAL ENCOUNTER (OUTPATIENT)
Dept: PHYSICAL THERAPY | Age: 46
Setting detail: THERAPIES SERIES
Discharge: HOME | End: 2021-05-14
Payer: COMMERCIAL

## 2021-05-14 PROCEDURE — 97530 THERAPEUTIC ACTIVITIES: CPT | Performed by: PHYSICAL THERAPIST

## 2021-05-14 PROCEDURE — 97110 THERAPEUTIC EXERCISES: CPT | Performed by: PHYSICAL THERAPIST

## 2021-05-14 PROCEDURE — 97112 NEUROMUSCULAR REEDUCATION: CPT | Performed by: PHYSICAL THERAPIST

## 2021-05-14 NOTE — FLOWSHEET NOTE
100 [facility] a Department of [address]  [facility] [address]  Office: [phone]  Fax:  [phone]          Date:  2021    Patient Name:  Driss Mckeon    :  [age]  MRN: [id number]  Restrictions/Precautions:    Medical/Treatment Diagnosis Information:  · Diagnosis: s/p left knee medial meniscectomy; left knee pain M25.562. Surgery on   · Treatment Diagnosis: left knee pain- K88.319  Insurance/Certification information:  PT Insurance Information: Jo-Ann  Physician Information:  Referring Practitioner: Gasper Mayorga  Has the plan of care been signed (Y/N):        [x]  Yes  []  No     Date of Patient follow up with Physician:       Is this a Progress Report:     []  Yes  [x]  No      Progress report/ Recertification will be due (10 Rx or 30 days whichever is less): visit 10 or 26 May 21        Visit # Insurance Allowable Auth Required   5 60 []  Yes []  No        Functional Scale: LEFS-45%   Date assessed: 2021    Latex Allergy:  [x]NO      []YES  Preferred Language for Healthcare:   [x]English       []other:      Pain level: 0/10    SUBJECTIVE:  He is feeling pretty good. Every day he feels a little better. He is doing well overall.       OBJECTIVE: 11 May 21    Observation:    Test measurements:      ROM PROM AROM Overpressure Comment     L R L R L R     Flexion      134          Extension                                                          Strength L R Comment   Quad        Hamstring        Gastroc        Hip flexor        Hip ABD                                      RESTRICTIONS/PRECAUTIONS: WBAT    Exercises/Interventions:     Exercise/Equipment Repetitions/Resistance Other comments   Stretching     Hamstring 5x:30    Hip Flexion     ITB- Rope     Grion     Quad 3x:20    Inclined Calf 5x:30    Towel Pull     Piriformis                    SLR     Supine 3x10 2.5#    Prone 3x10 []IONTO  [x]  NMR (55495) x1     []  VASO  []  Manual (87592) x      [] Other: DN not covered  [x]  TA x  1    [] Mech Traction (17706)  []  ES(attended) (32480)      []ES (un) (43705):     GOALS:   Patient stated goal: be back to 100% and back to training    []? Progressing: []? Met: []? Not Met: []? Adjusted     Therapist goals for Patient:   Short Term Goals: To be achieved in: 2 weeks  1. Independent in HEP and progression per patient tolerance, in order to prevent re-injury. []? Progressing: []? Met: []? Not Met: []? Adjusted   2. Patient will have a decrease in pain of 3/10 at worst to facilitate improvement in movement, function, and ADLs as indicated by functional deficits. []? Progressing: []? Met: []? Not Met: []? Adjusted     Long Term Goals: To be achieved in: 6 weeks  1. Pt will demo a LEFS score of 90% or better to assist with reaching prior level of function. []? Progressing: []? Met: []? Not Met: []? Adjusted  2. Patient will demonstrate increased AROM to knee flexion to greater than or equal to 140 and knee ext to 0 to allow for proper joint functioning as indicated by patients functional deficits. []? Progressing: []? Met: []? Not Met: []? Adjusted  3. Patient will demonstrate an increase in strength of hip flex, ABD, and knee flex/ext to 4+/5 to allow for proper functional mobility as indicated by patients functional deficits. []? Progressing: []? Met: []? Not Met: []? Adjusted  4. Patient will return to amb in the community including up/down stairs with reciprocal gt without increased symptoms or restriction. []? Progressing: []? Met: []? Not Met: []? Adjusted  5. Pt will return to working out without c/o pain/limitiations. []? Progressing: []? Met: []? Not Met: []? Adjusted          Overall Progression Towards Functional goals/ Treatment Progress Update:  [x] Patient is progressing as expected towards functional goals listed.     [] Progression is slowed due to complexities/Impairments listed. [] Progression has been slowed due to co-morbidities. [] Plan just implemented, too soon to assess goals progression <30days   [] Goals require adjustment due to lack of progress  [] Patient is not progressing as expected and requires additional follow up with physician  [] Other    Prognosis for POC: [x] Good [] Fair  [] Poor      Patient requires continued skilled intervention: [x] Yes  [] No    Treatment/Activity Tolerance:  [x] Patient able to complete treatment  [] Patient limited by fatigue  [] Patient limited by pain     [] Patient limited by other medical complications  [] Other: Pt grace session well. He was able to grace progressions without c/o pain. He was fatigued after tx, but he did not have c/o pain. He was challenged by RDL and was a little nervous with bridging as this is how he felt he hurt his knee. Pt would continue to benefit from skilled PT to improve strength, ROM, and function. PLAN: If pt doesn't return, this note can be considered a D/C note. Consider lateral band walking.   [x] Continue per plan of care [] Alter current plan (see comments above)  [] Plan of care initiated [] Hold pending MD visit [] Discharge    Electronically signed by: Julio Jaimes DPT [id number]

## 2021-05-18 ENCOUNTER — HOSPITAL ENCOUNTER (OUTPATIENT)
Dept: PHYSICAL THERAPY | Age: 46
Setting detail: THERAPIES SERIES
Discharge: HOME | End: 2021-05-18
Payer: COMMERCIAL

## 2021-05-18 PROCEDURE — 97112 NEUROMUSCULAR REEDUCATION: CPT | Performed by: PHYSICAL THERAPIST

## 2021-05-18 PROCEDURE — 97110 THERAPEUTIC EXERCISES: CPT | Performed by: PHYSICAL THERAPIST

## 2021-05-18 PROCEDURE — 97530 THERAPEUTIC ACTIVITIES: CPT | Performed by: PHYSICAL THERAPIST

## 2021-05-18 NOTE — FLOWSHEET NOTE
100 [facility] a Department of [address]  Faiza Omar [place] [address], [address]  Office: [phone]  Fax:  [phone]          Date:  2021    Patient Name:  Rodney Arambula    :    MRN: [id number]  Restrictions/Precautions:    Medical/Treatment Diagnosis Information:  · Diagnosis: s/p left knee medial meniscectomy; left knee pain M25.562. Surgery on   · Treatment Diagnosis: left knee pain- L65.404  Insurance/Certification information:  PT Insurance Information: Jo-Ann  Physician Information:  Referring Practitioner: Radha Clements  Has the plan of care been signed (Y/N):        [x]  Yes  []  No     Date of Patient follow up with Physician:       Is this a Progress Report:     []  Yes  [x]  No      Progress report/ Recertification will be due (10 Rx or 30 days whichever is less): visit 10 or 26 May 21        Visit # Insurance Allowable Auth Required   6 60 []  Yes []  No        Functional Scale: LEFS-45%   Date assessed: 2021    Latex Allergy:  [x]NO      []YES  Preferred Language for Healthcare:   [x]English       []other:      Pain level: 0/10    SUBJECTIVE:  He had muscle soreness like a workout after last visit. He had no lasting pain. He feels more nimble today.      OBJECTIVE: 18 May 21    Observation:    Test measurements:      ROM PROM AROM Overpressure Comment     L R L R L R     Flexion      136          Extension                                                          Strength L R Comment   Quad        Hamstring        Gastroc        Hip flexor        Hip ABD                                      RESTRICTIONS/PRECAUTIONS: WBAT    Exercises/Interventions:     Exercise/Equipment Repetitions/Resistance Other comments   Stretching     Hamstring 5x:30    Hip Flexion     ITB- Rope     Grion     Quad 5x:30    Inclined Calf 5x:30    Towel Pull     Piriformis                    SLR     Supine 3x10 3# Prone 3x10 3#    Abduction 3x10 3#    Adducton 3x10 3#    SLR+ 5x:30    clams 3x10 8# Progress to 10#   SLR + ABD 3x:30         Isometrics     Quad sets     Ball Squeezes          Patellar Glides     Medial     Superior     Inferior          ROM     Weight Shift     Hang Weights     Sheet Pulls     Ankle Pumps          CKC     Calf raises 3x10 SL    Wall sits 5x:30 with body blade flex    Step ups     1 leg stand 30 sec x 5 Airex    Squatting 3x10 on BOSU    CC TKE     Balance      [place] x2 red band    Bridging     Triple threats     Stool Scoots     Biodex - weight shift   Ball Maze L4x3 in gamesSB bridge 3x10              RDL 3x10         PRE     Extension BLE 3x10 25# + wt RANGE: 90/40   Flexion BLE 3x10 40# + wt  RANGE: standing. Progress NV. Cable Column          Leg Press 3x10 160# RANGE: 70 deg-10 deg. Progress NV        Bike 8' L3  3' warm up; :30 up tempo/:30 recovery for the lastt 5'    Treadmill          Gait            Access Code: [email or web address]  URL: [email or web address]  Date: 04/29/2021  Prepared by: Kylee Stuart    Exercises  Seated Table Hamstring Stretch - 2 x daily - 7 x weekly - 5 sets - 30 hold  Seated Gastroc Stretch with Strap - 2 x daily - 7 x weekly - 5 sets - 30 hold  Supine Quadricep Sets - 10 x daily - 7 x weekly - 10 sets - 1 reps - 10 hold  Supine Ankle Pumps - 10 x daily - 7 x weekly - 3 sets - 10 reps  Supine Straight Leg Raises - 2 x daily - 7 x weekly - 3 sets - 10 reps  Sidelying Hip Abduction - 2 x daily - 7 x weekly - 3 sets - 10 reps  Supine Hip Adduction Isometric with Ball - 1 x daily - 7 x weekly - 10 sets - 10 hold  Supine Heel Slide with Strap - 2 x daily - 7 x weekly - 10 sets - 10 hold    Therapeutic Exercise and NMR EXR  [x](04683) Provided verbal/tactile cueing for activities related to strengthening, flexibility, endurance, ROM for improvements in LE, proximal hip, and core control with self care, mobility, lifting, ambulation.   [x] (83838) Provided verbal/tactile cueing for activities related to improving balance, coordination, kinesthetic sense, posture, motor skill, proprioception  to assist with LE, proximal hip, and core control in self care, mobility, lifting, ambulation and eccentric single leg control. NMR and Therapeutic Activities:    [x] (40654 or 31995) Provided verbal/tactile cueing for activities related to improving balance, coordination, kinesthetic sense, posture, motor skill, proprioception and motor activation to allow for proper function of core, proximal hip and LE with self care and ADLs  [x] (00451) Gait Re-education- Provided training and instruction to the patient for proper LE, core and proximal hip recruitment and positioning and eccentric body weight control with ambulation re-education including up and down stairs     Home Exercise Program:    [x] (94712) Reviewed/Progressed HEP activities related to strengthening, flexibility, endurance, ROM of core, proximal hip and LE for functional self-care, mobility, lifting and ambulation/stair navigation   [x] (00513)Reviewed/Progressed HEP activities related to improving balance, coordination, kinesthetic sense, posture, motor skill, proprioception of core, proximal hip and LE for self care, mobility, lifting, and ambulation/stair navigation      Manual Treatments:  PROM / STM / Oscillations-Mobs:  G-I, II, III, IV (PA's, Inf., Post.)  [x](85480) Provided manual therapy to mobilize LE, proximal hip and/or LS spine soft tissue/joints for the purpose of modulating pain, promoting relaxation,  increasing ROM, reducing/eliminating soft tissue swelling/inflammation/restriction, improving soft tissue extensibility and allowing for proper ROM for normal function with self care, mobility, lifting and ambulation.      Other:        Modalities:  Decline    Charges:  Timed Code Treatment Minutes: 47'    Total Treatment Minutes: 79'        []EVAL 26 [id number]   [] EVAL (MOD) 69227 []  EVAL (HIGH) 57200   []  RE-EVAL   [x]  FY(74918) x2     []IONTO  [x]  NMR (03311) x1     []  VASO  []  Manual (49298) x      [] Other: DN not covered  [x]  TA x  1    [] Mech Traction (18841)  []  ES(attended) (80489)      []ES (un) (88603):     GOALS:   Patient stated goal: be back to 100% and back to training    []? Progressing: []? Met: []? Not Met: []? Adjusted     Therapist goals for Patient:   Short Term Goals: To be achieved in: 2 weeks  1. Independent in HEP and progression per patient tolerance, in order to prevent re-injury. []? Progressing: []? Met: []? Not Met: []? Adjusted   2. Patient will have a decrease in pain of 3/10 at worst to facilitate improvement in movement, function, and ADLs as indicated by functional deficits. []? Progressing: []? Met: []? Not Met: []? Adjusted     Long Term Goals: To be achieved in: 6 weeks  1. Pt will demo a LEFS score of 90% or better to assist with reaching prior level of function. []? Progressing: []? Met: []? Not Met: []? Adjusted  2. Patient will demonstrate increased AROM to knee flexion to greater than or equal to 140 and knee ext to 0 to allow for proper joint functioning as indicated by patients functional deficits. []? Progressing: []? Met: []? Not Met: []? Adjusted  3. Patient will demonstrate an increase in strength of hip flex, ABD, and knee flex/ext to 4+/5 to allow for proper functional mobility as indicated by patients functional deficits. []? Progressing: []? Met: []? Not Met: []? Adjusted  4. Patient will return to amb in the community including up/down stairs with reciprocal gt without increased symptoms or restriction. []? Progressing: []? Met: []? Not Met: []? Adjusted  5. Pt will return to working out without c/o pain/limitiations. []? Progressing: []? Met: []? Not Met: []?  Adjusted          Overall Progression Towards Functional goals/ Treatment Progress Update:  [x] Patient is progressing as expected towards functional goals listed. [] Progression is slowed due to complexities/Impairments listed. [] Progression has been slowed due to co-morbidities. [] Plan just implemented, too soon to assess goals progression <30days   [] Goals require adjustment due to lack of progress  [] Patient is not progressing as expected and requires additional follow up with physician  [] Other    Prognosis for POC: [x] Good [] Fair  [] Poor      Patient requires continued skilled intervention: [x] Yes  [] No    Treatment/Activity Tolerance:  [x] Patient able to complete treatment  [] Patient limited by fatigue  [] Patient limited by pain     [] Patient limited by other medical complications  [] Other: Pt grace session well. He reports calf raises are getting easier. He is becoming more comfortable and confident in his knee. He was challenged with keeping the body blade moving with wall sits. Overall, pt continues to make good progress. Pt would continue to benefit from skilled PT to improve strength, ROM, and function. PLAN: If pt doesn't return, this note can be considered a D/C note. Progress strength per pt grace. Progress calf raises.    [x] Continue per plan of care [] Alter current plan (see comments above)  [] Plan of care initiated [] Hold pending MD visit [] Discharge    Electronically signed by: Omero Garcia PT PT, DPT [id number]

## 2021-05-21 ENCOUNTER — HOSPITAL ENCOUNTER (OUTPATIENT)
Dept: PHYSICAL THERAPY | Age: 46
Setting detail: THERAPIES SERIES
Discharge: HOME | End: 2021-05-21
Payer: COMMERCIAL

## 2021-05-21 PROCEDURE — 97112 NEUROMUSCULAR REEDUCATION: CPT | Performed by: PHYSICAL THERAPIST

## 2021-05-21 PROCEDURE — 97530 THERAPEUTIC ACTIVITIES: CPT | Performed by: PHYSICAL THERAPIST

## 2021-05-21 PROCEDURE — 97110 THERAPEUTIC EXERCISES: CPT | Performed by: PHYSICAL THERAPIST

## 2021-05-21 NOTE — FLOWSHEET NOTE
100 [facility] a Department of [address]  Maddie Nelson [place] [address], [address]  Office: [phone]  Fax:  [phone]          Date:  2021    Patient Name:  Mariano Aragon    :  [birth year]  MRN: [id number]  Restrictions/Precautions:    Medical/Treatment Diagnosis Information:  · Diagnosis: s/p left knee medial meniscectomy; left knee pain M25.562. Surgery on   · Treatment Diagnosis: left knee pain- X19.745  Insurance/Certification information:  PT Insurance Information: Jo-Ann  Physician Information:  Referring Practitioner: Anabel Ragland  Has the plan of care been signed (Y/N):        [x]  Yes  []  No     Date of Patient follow up with Physician:       Is this a Progress Report:     []  Yes  [x]  No      Progress report/ Recertification will be due (10 Rx or 30 days whichever is less): visit 10 or 26 May 21        Visit # Insurance Allowable Auth Required   7 60 []  Yes []  No        Functional Scale: LEFS-45%   Date assessed: 2021    Latex Allergy:  [x]NO      []YES  Preferred Language for Healthcare:   [x]English       []other:      Pain level: 0/10    SUBJECTIVE:  He feels muscle soreness after his tx. He feels okay overall though. He is doing stairs now without thinking about it. He gets OOB without problems in the morning. He does run a little for a warm up.      OBJECTIVE: 18 May 21    Observation:    Test measurements:      ROM PROM AROM Overpressure Comment     L R L R L R     Flexion      136          Extension                                                          Strength L R Comment   Quad        Hamstring        Gastroc        Hip flexor        Hip ABD                                      RESTRICTIONS/PRECAUTIONS: WBAT    Exercises/Interventions:     Exercise/Equipment Repetitions/Resistance Other comments   Stretching     Hamstring 5x:30    Hip Flexion     ITB- Rope     Grion     Quad 5x:30 Inclined Calf 5x:30    Towel Pull     Piriformis                    SLR     Supine 3x10 4#    Prone 3x10 4#    Abduction 3x10 4#    Adducton 3x10 4#    SLR+ 5x:30    clams 3x10 10# Progress NV   SLR + ABD 3x:30         Isometrics     Quad sets     Ball Squeezes          Patellar Glides     Medial     Superior     Inferior          ROM     Weight Shift     Hang Weights     Sheet Pulls     Ankle Pumps          CKC     Calf raises 3x10 SL off step    Wall sits 5x:20-:30 single leg    Step ups 3x10 on BOSU    1 leg stand 30 sec x 5 Airex    Squatting 3x10 on BOSU    CC TKE     Balance      [place] [address] [place] x2 red band    Bridging     Triple threats     Stool Scoots     Biodex - weight shift   Ball Maze L4x3 in gamesSB bridge 3x10    SB roll out with hip ext into ABD 3x5 bilaterally         RDL 3x10 7.5#         Bear crawl     Lateral bear crawl                         PRE     Extension SLE 3x10 25#  RANGE: 90/40   Flexion SLE 3x10 40# RANGE: 0/90        Cable Column          Leg Press 10 x 80#, 2x10 100# SL RANGE: 70 deg-10 deg. Progress NV        Bike 8' L4  3' warm up; :30 up tempo/:30 recovery for the lastt 5'    Treadmill          Gait            Access Code: [email or web address]  URL: [email or web address]  Date: 04/29/2021  Prepared by: Myrna Stuart    Exercises  Seated Table Hamstring Stretch - 2 x daily - 7 x weekly - 5 sets - 30 hold  Seated Gastroc Stretch with Strap - 2 x daily - 7 x weekly - 5 sets - 30 hold  Supine Quadricep Sets - 10 x daily - 7 x weekly - 10 sets - 1 reps - 10 hold  Supine Ankle Pumps - 10 x daily - 7 x weekly - 3 sets - 10 reps  Supine Straight Leg Raises - 2 x daily - 7 x weekly - 3 sets - 10 reps  Sidelying Hip Abduction - 2 x daily - 7 x weekly - 3 sets - 10 reps  Supine Hip Adduction Isometric with Ball - 1 x daily - 7 x weekly - 10 sets - 10 hold  Supine Heel Slide with Strap - 2 x daily - 7 x weekly - 10 sets - 10 hold    Therapeutic Exercise and NMR EXR  [x](46644) Provided verbal/tactile cueing for activities related to strengthening, flexibility, endurance, ROM for improvements in LE, proximal hip, and core control with self care, mobility, lifting, ambulation. [x] (52769) Provided verbal/tactile cueing for activities related to improving balance, coordination, kinesthetic sense, posture, motor skill, proprioception  to assist with LE, proximal hip, and core control in self care, mobility, lifting, ambulation and eccentric single leg control.      NMR and Therapeutic Activities:    [x] (26739 or 71470) Provided verbal/tactile cueing for activities related to improving balance, coordination, kinesthetic sense, posture, motor skill, proprioception and motor activation to allow for proper function of core, proximal hip and LE with self care and ADLs  [x] (83202) Gait Re-education- Provided training and instruction to the patient for proper LE, core and proximal hip recruitment and positioning and eccentric body weight control with ambulation re-education including up and down stairs     Home Exercise Program:    [x] (07455) Reviewed/Progressed HEP activities related to strengthening, flexibility, endurance, ROM of core, proximal hip and LE for functional self-care, mobility, lifting and ambulation/stair navigation   [x] (25756)Reviewed/Progressed HEP activities related to improving balance, coordination, kinesthetic sense, posture, motor skill, proprioception of core, proximal hip and LE for self care, mobility, lifting, and ambulation/stair navigation      Manual Treatments:  PROM / STM / Oscillations-Mobs:  G-I, II, III, IV (PA's, Inf., Post.)  [x](17076) Provided manual therapy to mobilize LE, proximal hip and/or LS spine soft tissue/joints for the purpose of modulating pain, promoting relaxation,  increasing ROM, reducing/eliminating soft tissue swelling/inflammation/restriction, improving soft tissue extensibility and allowing for proper ROM for normal function with self

## 2021-05-25 ENCOUNTER — HOSPITAL ENCOUNTER (OUTPATIENT)
Dept: PHYSICAL THERAPY | Age: 46
Setting detail: THERAPIES SERIES
Discharge: HOME | End: 2021-05-25
Payer: COMMERCIAL

## 2021-05-25 PROCEDURE — 97530 THERAPEUTIC ACTIVITIES: CPT | Performed by: PHYSICAL THERAPIST

## 2021-05-25 PROCEDURE — 97110 THERAPEUTIC EXERCISES: CPT | Performed by: PHYSICAL THERAPIST

## 2021-05-25 PROCEDURE — 97112 NEUROMUSCULAR REEDUCATION: CPT | Performed by: PHYSICAL THERAPIST

## 2021-05-25 NOTE — PLAN OF CARE
100 [facility] Performance and Rehabilitation a Department of [address]  [address], [address]  Office: [phone]  Fax:  [phone]   Physical Therapy Re-Certification Plan of Care    Dear Dr. Radha Clements,    We had the pleasure of treating the following patient for physical therapy services at [address] [place] [address]. A summary of our findings can be found in the updated assessment below. This includes our plan of care. If you have any questions or concerns regarding these findings, please do not hesitate to contact me at the office phone number checked above. Thank you for the referral.     Physician Signature:________________________________Date:__________________  By signing above (or electronic signature), therapists plan is approved by physician    Date Range Of Visits: 4/29/21-5/25/2021  Total Visits to Date: 8  Overall Response to Treatment:   [] Patient is responding well to treatment and improvement is noted with regards to goals   [] Patient should continue to improve in reasonable time if they continue HEP   [] Patient has plateaued and is no longer responding to skilled PT intervention    [] Patient is getting worse and would benefit from return to referring MD   [] Patient unable to adhere to initial POC   [x] Other:  Pt grace session well. He was able to grace running on the [employer]. He did have slightly smoother gt with landing and stance on the right compared to the left. He is progressing appropriately. I did ask him to continue to work on stretching his quad in order to get into his [employer] workout. Pt would continue to benefit from skilled PT to improve strength, ROM, and function. Anticipate weaning to HEP over the next couple weeks and continuing tx 1-2x/wk. Pt does not plan on returning to [facility] until July, which will allow for a gradual return.                    Date:  5/25/2021    Patient Name:  Maria C Glides     Medial     Superior     Inferior          ROM     Weight Shift     Hang Weights     Sheet Pulls     Ankle Pumps          CKC     Calf raises 3x10 SL off step    Wall sits 5x with red band  :30 wall sit with red band  1 square band walk     Step ups 3x10 on BOSU    1 leg stand 30 sec x 5 Airex    Squatting 3x10 on BOSU    CC TKE     Balance      Monster Walks     Bridging     Triple threats     Stool Scoots     Biodex - weight shift Ball Maze L4x3 in gamesSB bridge with HS curl 3x8    SB roll out with hip ext into ABD 3x5 bilaterally         RDL 3x10 10#                                     PRE     Extension SLE 3x10 40#  RANGE: 90/40   Flexion SLE 3x10 40# RANGE: 0/90        Cable Column          Leg Press 3x10 100# SL RANGE: 70 deg-10 deg. Progress NV        Bike 8' L4  3' warm up; :30 up tempo/:30 recovery for the lastt 5'    Treadmill     AlterG 3' walk at 70%  1' run/1' walk x 3 at 70%  1' run/1' walk x2 at 80%  1' run/1' walk x1 at 90%  1' run/1' walk x1 at 100%              Gait            Access Code: [email or web address]  URL: [email or web address]  Date: 04/29/2021  Prepared by: Cristi Sweeney Cessdidi    Exercises  Seated Table Hamstring Stretch - 2 x daily - 7 x weekly - 5 sets - 30 hold  Seated Gastroc Stretch with Strap - 2 x daily - 7 x weekly - 5 sets - 30 hold  Supine Quadricep Sets - 10 x daily - 7 x weekly - 10 sets - 1 reps - 10 hold  Supine Ankle Pumps - 10 x daily - 7 x weekly - 3 sets - 10 reps  Supine Straight Leg Raises - 2 x daily - 7 x weekly - 3 sets - 10 reps  Sidelying Hip Abduction - 2 x daily - 7 x weekly - 3 sets - 10 reps  Supine Hip Adduction Isometric with Ball - 1 x daily - 7 x weekly - 10 sets - 10 hold  Supine Heel Slide with Strap - 2 x daily - 7 x weekly - 10 sets - 10 hold    Therapeutic Exercise and NMR EXR  [x](82151) Provided verbal/tactile cueing for activities related to strengthening, flexibility, endurance, ROM for improvements in LE, proximal hip, and core control 100'        []EVAL (LOW) 74545   [] EVAL (MOD) 89406   []  EVAL (HIGH) 24462   []  RE-EVAL   [x]  GENEVA(37259) x2     []IONTO  [x]  NMR (56085) x1     []  VASO  []  Manual (30670) x      [] Other: DN not covered  [x]  TA x  2    [] Mech Traction (01048)  []  ES(attended) (66609)      []ES (un) (12584):     GOALS:   Patient stated goal: be back to 100% and back to training    [x]? Progressing: []? Met: []? Not Met: []? Adjusted     Therapist goals for Patient:   Short Term Goals: To be achieved in: 2 weeks  1. Independent in HEP and progression per patient tolerance, in order to prevent re-injury. []? Progressing: [x]? Met: []? Not Met: []? Adjusted   2. Patient will have a decrease in pain of 3/10 at worst to facilitate improvement in movement, function, and ADLs as indicated by functional deficits. []? Progressing: [x]? Met: []? Not Met: []? Adjusted     Long Term Goals: To be achieved in: 6 weeks  1. Pt will demo a LEFS score of 90% or better to assist with reaching prior level of function. [x]? Progressing: []? Met: []? Not Met: []? Adjusted   2. Patient will demonstrate increased AROM to knee flexion to greater than or equal to 140 and knee ext to 0 to allow for proper joint functioning as indicated by patients functional deficits. []? Progressing: [x]? Met: []? Not Met: []? Adjusted  3. Patient will demonstrate an increase in strength of hip flex, ABD, and knee flex/ext to 4+/5 to allow for proper functional mobility as indicated by patients functional deficits. [x]? Progressing: []? Met: []? Not Met: []? Adjusted  4. Patient will return to amb in the community including up/down stairs with reciprocal gt without increased symptoms or restriction. []? Progressing: [x]? Met: []? Not Met: []? Adjusted  5. Pt will return to working out without c/o pain/limitiations. [x]? Progressing: []? Met: []? Not Met: []?  Adjusted          Overall Progression Towards Functional goals/ Treatment Progress Update:  [x] Patient is progressing as expected towards functional goals listed. [] Progression is slowed due to complexities/Impairments listed. [] Progression has been slowed due to co-morbidities. [] Plan just implemented, too soon to assess goals progression <30days   [] Goals require adjustment due to lack of progress  [] Patient is not progressing as expected and requires additional follow up with physician  [] Other    Prognosis for POC: [x] Good [] Fair  [] Poor      Patient requires continued skilled intervention: [x] Yes  [] No    Treatment/Activity Tolerance:  [x] Patient able to complete treatment  [] Patient limited by fatigue  [] Patient limited by pain     [] Patient limited by other medical complications  [] Other: Pt grace session well. He was able to grace running on the [employer]. He did have slightly smoother gt with landing and stance on the right compared to the left. He is progressing appropriately. I did ask him to continue to work on stretching his quad in order to get into his [employer] workout. Pt would continue to benefit from skilled PT to improve strength, ROM, and function. Anticipate weaning to HEP over the next couple weeks and continuing tx 1-2x/wk. Pt does not plan on returning to [facility] until July, which will allow for a gradual return. PLAN: If pt doesn't return, this note can be considered a D/C note. Progress strength per pt grace. Consider more sport specific activities. Consider more Jujutsu drill type of exercises.     [x] Continue per plan of care [] Alter current plan (see comments above)  [] Plan of care initiated [] Hold pending MD visit [] Discharge    Electronically signed by: Griselda Boyd, PT PT, DPT [id number]

## 2021-05-28 ENCOUNTER — OFFICE VISIT (OUTPATIENT)
Dept: ORTHOPEDIC SURGERY | Age: 46
End: 2021-05-28

## 2021-05-28 ENCOUNTER — HOSPITAL ENCOUNTER (OUTPATIENT)
Dept: PHYSICAL THERAPY | Age: 46
Setting detail: THERAPIES SERIES
Discharge: HOME | End: 2021-05-28
Payer: COMMERCIAL

## 2021-05-28 VITALS — HEIGHT: 72 IN | BODY MASS INDEX: 25.33 KG/M2 | WEIGHT: 187 LBS | RESPIRATION RATE: 12 BRPM

## 2021-05-28 DIAGNOSIS — S83.212D BUCKET-HANDLE TEAR OF MEDIAL MENISCUS OF LEFT KNEE AS CURRENT INJURY, SUBSEQUENT ENCOUNTER: Primary | ICD-10-CM

## 2021-05-28 PROCEDURE — 97530 THERAPEUTIC ACTIVITIES: CPT | Performed by: PHYSICAL THERAPIST

## 2021-05-28 PROCEDURE — 97110 THERAPEUTIC EXERCISES: CPT | Performed by: PHYSICAL THERAPIST

## 2021-05-28 PROCEDURE — 97112 NEUROMUSCULAR REEDUCATION: CPT | Performed by: PHYSICAL THERAPIST

## 2021-05-28 PROCEDURE — 99024 POSTOP FOLLOW-UP VISIT: CPT | Performed by: ORTHOPAEDIC SURGERY

## 2021-05-28 NOTE — FLOWSHEET NOTE
100 [facility] Performance and Rehabilitation a Department of [address]  Maddie Nelson [place] [address], [address]  Office: [phone]  Fax:  [phone]   Physical Therapy Re-Certification Plan of Care    Dear Dr. Mobley Woden,    We had the pleasure of treating the following patient for physical therapy services at [address] [place] [address]. A summary of our findings can be found in the updated assessment below. This includes our plan of care. If you have any questions or concerns regarding these findings, please do not hesitate to contact me at the office phone number checked above. Thank you for the referral.     Physician Signature:________________________________Date:__________________  By signing above (or electronic signature), therapists plan is approved by physician    Date Range Of Visits: 4/29/21-5/28/2021  Total Visits to Date: 8  Overall Response to Treatment:   [] Patient is responding well to treatment and improvement is noted with regards to goals   [] Patient should continue to improve in reasonable time if they continue HEP   [] Patient has plateaued and is no longer responding to skilled PT intervention    [] Patient is getting worse and would benefit from return to referring MD   [] Patient unable to adhere to initial POC   [x] Other:  Pt grace session well. He was able to grace running on the AlterG. He did have slightly smoother gt with landing and stance on the right compared to the left. He is progressing appropriately. I did ask him to continue to work on stretching his quad in order to get into his [employer] workout. Pt would continue to benefit from skilled PT to improve strength, ROM, and function. Anticipate weaning to HEP over the next couple weeks and continuing tx 1-2x/wk. Pt does not plan on returning to [facility] until July, which will allow for a gradual return.                    Date:  5/28/2021    Patient Name:  Maria C Paulina    :    MRN: [id number]  Restrictions/Precautions:    Medical/Treatment Diagnosis Information:  · Diagnosis: s/p left knee medial meniscectomy; left knee pain M25.562. Surgery on   · Treatment Diagnosis: left knee pain- K58.047  Insurance/Certification information:  PT Insurance Information: Jo-Ann  Physician Information:  Referring Practitioner: Ernesto Newberry  Has the plan of care been signed (Y/N):        [x]  Yes  []  No     Date of Patient follow up with Physician: 28 May 21      Is this a Progress Report:     []  Yes  [x]  No      Progress report/ Recertification will be due (10 Rx or 30 days whichever is less): visit 18 or         Visit # Insurance Allowable Auth Required   9 60 []  Yes []  No        Functional Scale: LEFS-80%   Date assessed: 2021    Latex Allergy:  [x]NO      []YES  Preferred Language for Healthcare:   [x]English       []other:      Pain level: 0/10    SUBJECTIVE:  He is feeling pretty good. His hamstrings were sore this morning. He used the gun on them, and they are better now. He plans on playing golf this weekend.       OBJECTIVE: 25 May 21    Observation:    Test measurements:                ROM PROM AROM Overpressure Comment     L R L R L R     Flexion      134          Extension      0                                                    Strength L R Comment   Quad 4+      Hamstring  4      Gastroc        Hip flexor  4+      Hip ABD  4                                    RESTRICTIONS/PRECAUTIONS: WBAT    Exercises/Interventions:     Exercise/Equipment Repetitions/Resistance Other comments   Stretching     Hamstring 5x:30    Hip Flexion     ITB- Rope     Grion     Quad 5x:30    Inclined Calf 5x:30    Towel Pull     Piriformis                    SLR     Supine 3x10 6#    Prone 3x10 6#    Abduction 3x10 6#    Adducton 3x10 6#    SLR+ 5x:30 +30 pumps    clams 3x10 6# reverse     SLR + ABD 3x:30 +30 pumps         Isometrics     [employer] Squeezes          Patellar Glides     Medial     Superior     Inferior          ROM     Weight Shift     Hang Weights     Sheet Pulls     Ankle Pumps          CKC     Calf raises     Wall sits 5x with red band  :30 wall sit with red band  1 square band walk   Bridges with red band    LSD 3x10 L1    Step ups     1 leg stand 30 sec x 5 Airex    Squatting     CC TKE     Balance      Monster Walks     Bridging     Triple threats     Stool Scoots     Biodex - weight shift SB knee tuck to side 3x10 ea         SB bridge with HS curl     SB roll out with hip ext into ABD 3x5 bilaterally         RDL 3x10 10#                 Drills 5'    Lunges bilaterally 3x10    Lateral lunge onto BOSU 3x10 bilaterally                   PRE     Extension SLE 10x 40#   8x 55#  6x 70# RANGE: 90/40   Flexion SLE 3x10 40# RANGE: 0/90        Cable Column          Leg Press 10x 100# SL   8x 80#  6x60# RANGE: 70 deg-10 deg. Progress NV? Bike     Treadmill     AlterG 3' walk at 70%  1' run/1' walk x3 at 80%  1' run/1' walk x2 at 90%  1' run/1' walk x1 at 100%              Gait            Access Code: [email or web address]  URL: [email or web address]  Date: 04/29/2021  Prepared by: Ajay Stuart    Exercises  Seated Table Hamstring Stretch - 2 x daily - 7 x weekly - 5 sets - 30 hold  Seated Gastroc Stretch with Strap - 2 x daily - 7 x weekly - 5 sets - 30 hold  Supine Quadricep Sets - 10 x daily - 7 x weekly - 10 sets - 1 reps - 10 hold  Supine Ankle Pumps - 10 x daily - 7 x weekly - 3 sets - 10 reps  Supine Straight Leg Raises - 2 x daily - 7 x weekly - 3 sets - 10 reps  Sidelying Hip Abduction - 2 x daily - 7 x weekly - 3 sets - 10 reps  Supine Hip Adduction Isometric with Ball - 1 x daily - 7 x weekly - 10 sets - 10 hold  Supine Heel Slide with Strap - 2 x daily - 7 x weekly - 10 sets - 10 hold    Therapeutic Exercise and NMR EXR  [x](99224) Provided verbal/tactile cueing for activities related to strengthening, flexibility, endurance, ROM for improvements in LE, proximal hip, and core control with self care, mobility, lifting, ambulation. [x] (51901) Provided verbal/tactile cueing for activities related to improving balance, coordination, kinesthetic sense, posture, motor skill, proprioception  to assist with LE, proximal hip, and core control in self care, mobility, lifting, ambulation and eccentric single leg control. NMR and Therapeutic Activities:    [x] (99628 or 42533) Provided verbal/tactile cueing for activities related to improving balance, coordination, kinesthetic sense, posture, motor skill, proprioception and motor activation to allow for proper function of core, proximal hip and LE with self care and ADLs  [x] (25269) Gait Re-education- Provided training and instruction to the patient for proper LE, core and proximal hip recruitment and positioning and eccentric body weight control with ambulation re-education including up and down stairs     Home Exercise Program:    [x] (62125) Reviewed/Progressed HEP activities related to strengthening, flexibility, endurance, ROM of core, proximal hip and LE for functional self-care, mobility, lifting and ambulation/stair navigation   [x] (33902)Reviewed/Progressed HEP activities related to improving balance, coordination, kinesthetic sense, posture, motor skill, proprioception of core, proximal hip and LE for self care, mobility, lifting, and ambulation/stair navigation      Manual Treatments:  PROM / STM / Oscillations-Mobs:  G-I, II, III, IV (PA's, Inf., Post.)  [x](83968) Provided manual therapy to mobilize LE, proximal hip and/or LS spine soft tissue/joints for the purpose of modulating pain, promoting relaxation,  increasing ROM, reducing/eliminating soft tissue swelling/inflammation/restriction, improving soft tissue extensibility and allowing for proper ROM for normal function with self care, mobility, lifting and ambulation.      Other:        Modalities:  Declined    Charges:   Timed Code Treatment Minutes: 71'    Total Treatment Minutes: 105'        []EVAL (LOW) 455 1011   [] EVAL (MOD) 54446   []  EVAL (HIGH) 01227   []  RE-EVAL   [x]  YH(67598) x2     []IONTO  [x]  NMR (61911) x1     []  VASO  []  Manual (03216) x      [] Other: DN not covered  [x]  TA x  2    [] Mech Traction (88092)  []  ES(attended) (61609)      []ES (un) (53869):     GOALS:   Patient stated goal: be back to 100% and back to training    [x]? Progressing: []? Met: []? Not Met: []? Adjusted     Therapist goals for Patient:   Short Term Goals: To be achieved in: 2 weeks  1. Independent in HEP and progression per patient tolerance, in order to prevent re-injury. []? Progressing: [x]? Met: []? Not Met: []? Adjusted   2. Patient will have a decrease in pain of 3/10 at worst to facilitate improvement in movement, function, and ADLs as indicated by functional deficits. []? Progressing: [x]? Met: []? Not Met: []? Adjusted     Long Term Goals: To be achieved in: 6 weeks  1. Pt will demo a LEFS score of 90% or better to assist with reaching prior level of function. [x]? Progressing: []? Met: []? Not Met: []? Adjusted   2. Patient will demonstrate increased AROM to knee flexion to greater than or equal to 140 and knee ext to 0 to allow for proper joint functioning as indicated by patients functional deficits. []? Progressing: [x]? Met: []? Not Met: []? Adjusted  3. Patient will demonstrate an increase in strength of hip flex, ABD, and knee flex/ext to 4+/5 to allow for proper functional mobility as indicated by patients functional deficits. [x]? Progressing: []? Met: []? Not Met: []? Adjusted  4. Patient will return to amb in the community including up/down stairs with reciprocal gt without increased symptoms or restriction. []? Progressing: [x]? Met: []? Not Met: []? Adjusted  5. Pt will return to working out without c/o pain/limitiations. [x]? Progressing: []? Met: []? Not Met: []?  Adjusted          Overall Progression Towards Functional goals/ Treatment Progress Update:  [x] Patient is progressing as expected towards functional goals listed. [] Progression is slowed due to complexities/Impairments listed. [] Progression has been slowed due to co-morbidities. [] Plan just implemented, too soon to assess goals progression <30days   [] Goals require adjustment due to lack of progress  [] Patient is not progressing as expected and requires additional follow up with physician  [] Other    Prognosis for POC: [x] Good [] Fair  [] Poor      Patient requires continued skilled intervention: [x] Yes  [] No    Treatment/Activity Tolerance:  [x] Patient able to complete treatment  [] Patient limited by fatigue  [] Patient limited by pain     [] Patient limited by other medical complications  [] Other: Pt grace session well. Today we did some Jujutsu type activities today on the floor on a mat. This went well. Pt does have some discomfort with deep knee flexion like with kneeling. We did discuss using a towel to try to help with this. He did report some pain with the 100% running, but he feels that this is normal for him. Pt to come 1x/wk for the next 2-3 wks to transition back to PLOF. PLAN: If pt doesn't return, this note can be considered a D/C note. Progress strength per pt grace. Consider more sport specific activities. Consider more Jujutsu drill type of exercises.     [x] Continue per plan of care [] Alter current plan (see comments above)  [] Plan of care initiated [] Hold pending MD visit [] Discharge    Electronically signed by: Tawana Urbano, PT PT, DPT [id number]

## 2021-06-01 ENCOUNTER — HOSPITAL ENCOUNTER (OUTPATIENT)
Dept: PHYSICAL THERAPY | Age: 46
Setting detail: THERAPIES SERIES
Discharge: HOME | End: 2021-06-01
Payer: COMMERCIAL

## 2021-06-01 PROCEDURE — 97530 THERAPEUTIC ACTIVITIES: CPT | Performed by: PHYSICAL THERAPIST

## 2021-06-01 PROCEDURE — 97110 THERAPEUTIC EXERCISES: CPT | Performed by: PHYSICAL THERAPIST

## 2021-06-01 PROCEDURE — 97112 NEUROMUSCULAR REEDUCATION: CPT | Performed by: PHYSICAL THERAPIST

## 2021-06-11 ENCOUNTER — HOSPITAL ENCOUNTER (OUTPATIENT)
Dept: PHYSICAL THERAPY | Age: 46
Setting detail: THERAPIES SERIES
Discharge: HOME | End: 2021-06-11
Payer: COMMERCIAL

## 2021-06-11 PROCEDURE — 97110 THERAPEUTIC EXERCISES: CPT | Performed by: PHYSICAL THERAPIST

## 2021-06-11 PROCEDURE — 97112 NEUROMUSCULAR REEDUCATION: CPT | Performed by: PHYSICAL THERAPIST

## 2021-06-11 PROCEDURE — 97530 THERAPEUTIC ACTIVITIES: CPT | Performed by: PHYSICAL THERAPIST

## 2021-06-11 NOTE — FLOWSHEET NOTE
100 [facility] Performance and Rehabilitation a Department of [address]  [facility] [place] [address], [address]  Office: [phone]  Fax:  [phone]           Date:  2021    Patient Name:  Severiano Allen    :    MRN: [id number]  Restrictions/Precautions:    Medical/Treatment Diagnosis Information:  · Diagnosis: s/p left knee medial meniscectomy; left knee pain M25.562. Surgery on   · Treatment Diagnosis: left knee pain- M87.143  Insurance/Certification information:  PT Insurance Information: Jo-Ann  Physician Information:  Referring Practitioner: Marek Lake  Has the plan of care been signed (Y/N):        [x]  Yes  []  No     Date of Patient follow up with Physician: prn      Is this a Progress Report:     []  Yes  [x]  No      Progress report/ Recertification will be due (10 Rx or 30 days whichever is less): visit 18 or         Visit # Insurance Allowable Auth Required   11 60 []  Yes []  No        Functional Scale: LEFS-80%   Date assessed: 2021    Latex Allergy:  [x]NO      []YES  Preferred Language for Healthcare:   [x]English       []other:      Pain level: 0/10    SUBJECTIVE:  Yesterday, he had a little pain. The sleeve makes it feel better. He did play golf 3 days in a row without knee pain/problem.       OBJECTIVE:     Observation:    Test measurements:                ROM PROM AROM Overpressure Comment     L R L R L R     Flexion      141          Extension                                                          Strength L R Comment   Quad       Hamstring       Gastroc       Hip flexor       Hip ABD                                     RESTRICTIONS/PRECAUTIONS: WBAT    Exercises/Interventions:     Exercise/Equipment Repetitions/Resistance Other comments   Stretching     Hamstring 5x:30    Hip Flexion     ITB- Rope     Grion     Quad 5x:30    Inclined Calf 5x:30    Towel Pull     Piriformis                    SLR Supine 3x10 7#    Prone 3x10 7#    Abduction 3x10 7#    Adducton 3x10 7#    SLR+ 5x:30 +30 pumps    clams 3x10 7# reverse     SLR + ABD          Isometrics     Quad sets     Ball Squeezes          Patellar Glides     Medial     Superior     Inferior          ROM     Weight Shift     Hang Weights     Sheet Pulls     Ankle Pumps          CKC     Calf raises     Wall sits 5x with light purple band  :30 wall sit with light purple band  1 square band walk       LSD 3x10 L1    Step ups     1 leg stand 30 sec x 5 Airex EC   Squatting 3x10 on BOSU    CC TKE     Balance      Monster Walks     Bridging     Triple threats     Stool Scoots     Biodex - weight shift TRX knee tuck to side 3x8 ea side         SB bridge with HS curl 3x8    SB roll out with hip ext into ABD 3x8 bilaterally            Walking dead lunges    Bear crawl Fwd/bwd   Lateral bear crawl    Inch worm          Lateral lunge onto BOSU 2x10 bilaterally    Lateral step up/over on BOSU 10x ea              PRE     Extension SLE 10x 40#   8x 55#  6x 70#  RANGE: 90/40   Flexion SLE 10x 40#   8x 55#  6x 55#  RANGE: 0/90        Cable Column          Leg Press 10x 100# SL   8x 140#  6x 160# RANGE: 70 deg-10 deg. Bike     Treadmill 2' walk  1/ run/1/ walk x4  At 1% inncline    AlterG               Gait            Access Code: [email or web address]  URL: [email or web address]  Date: 04/29/2021  Prepared by: Tod Stuart    Exercises  Seated Table Hamstring Stretch - 2 x daily - 7 x weekly - 5 sets - 30 hold  Seated Gastroc Stretch with Strap - 2 x daily - 7 x weekly - 5 sets - 30 hold  Supine Quadricep Sets - 10 x daily - 7 x weekly - 10 sets - 1 reps - 10 hold  Supine Ankle Pumps - 10 x daily - 7 x weekly - 3 sets - 10 reps  Supine Straight Leg Raises - 2 x daily - 7 x weekly - 3 sets - 10 reps  Sidelying Hip Abduction - 2 x daily - 7 x weekly - 3 sets - 10 reps  Supine Hip Adduction Isometric with Ball - 1 x daily - 7 x weekly - 10 sets - 10 hold  Supine symptoms or restriction. []? Progressing: [x]? Met: []? Not Met: []? Adjusted  5. Pt will return to working out without c/o pain/limitiations. [x]? Progressing: []? Met: []? Not Met: []? Adjusted          Overall Progression Towards Functional goals/ Treatment Progress Update:  [x] Patient is progressing as expected towards functional goals listed. [] Progression is slowed due to complexities/Impairments listed. [] Progression has been slowed due to co-morbidities. [] Plan just implemented, too soon to assess goals progression <30days   [] Goals require adjustment due to lack of progress  [] Patient is not progressing as expected and requires additional follow up with physician  [] Other    Prognosis for POC: [x] Good [] Fair  [] Poor      Patient requires continued skilled intervention: [x] Yes  [] No    Treatment/Activity Tolerance:  [x] Patient able to complete treatment  [] Patient limited by fatigue  [] Patient limited by pain     [] Patient limited by other medical complications  [] Other: Pt grace session well. He was able to grace progressions well and without c/o. We did discuss weaning to HEP, and the pt does feel comfortable with this after next week. He grace running well, and his cardio is improving. He felt better with cardio on the treadmill today. Pt would continue to benefit from skilled PT to improve strength and return to PLOF. PLAN: If pt doesn't return, this note can be considered a D/C note. Consider more Jujutsu drill type of exercises. Anticipate weaning to HEP after next visit.    [x] Continue per plan of care [] Alter current plan (see comments above)  [] Plan of care initiated [] Hold pending MD visit [] Discharge    Electronically signed by: Kylah Ryder, PT PT, DPT [id number]

## 2021-06-15 ENCOUNTER — APPOINTMENT (OUTPATIENT)
Dept: PHYSICAL THERAPY | Age: 46
End: 2021-06-15
Payer: COMMERCIAL

## 2021-06-18 ENCOUNTER — HOSPITAL ENCOUNTER (OUTPATIENT)
Dept: PHYSICAL THERAPY | Age: 46
Setting detail: THERAPIES SERIES
Discharge: HOME | End: 2021-06-18
Payer: COMMERCIAL

## 2021-06-18 PROCEDURE — 97530 THERAPEUTIC ACTIVITIES: CPT | Performed by: PHYSICAL THERAPIST

## 2021-06-18 PROCEDURE — 97110 THERAPEUTIC EXERCISES: CPT | Performed by: PHYSICAL THERAPIST

## 2021-06-18 PROCEDURE — 97161 PT EVAL LOW COMPLEX 20 MIN: CPT | Performed by: PHYSICAL THERAPIST

## 2021-06-18 NOTE — PLAN OF CARE
[facility] Performance and Rehabilitation a Department of [address]  Maddie Nelson [place] [address], [address]  Office: [phone]  Fax:  [phone]      Physical Therapy Re-Certification Plan of Care    Dear Dr. Mylene Siemens,    We had the pleasure of treating the following patient for physical therapy services at [address] [place] [address]. A summary of our findings can be found in the updated assessment below. This includes our plan of care. If you have any questions or concerns regarding these findings, please do not hesitate to contact me at the office phone number checked above. Thank you for the referral.     Physician Signature:________________________________Date:__________________  By signing above (or electronic signature), therapists plan is approved by physician    Date Range Of Visits:   Total Visits to Date: 12  Overall Response to Treatment:   [] Patient is responding well to treatment and improvement is noted with regards to goals   [] Patient should continue to improve in reasonable time if they continue HEP   [] Patient has plateaued and is no longer responding to skilled PT intervention    [] Patient is getting worse and would benefit from return to referring MD   [] Patient unable to adhere to initial POC   [x] Other: Pt grace session well. He had no c/o t/o tx. We discussed how to progress to sport and which muscles to focus on (hamstrings). Pt does feel comfortable continuing on his own. Pt will continue strengthening and gradual return to [facility]. He can return prn and call with any questions. Date:  2021    Patient Name:  Courtney Ambriz    :    MRN: [id number]  Restrictions/Precautions:    Medical/Treatment Diagnosis Information:  · Diagnosis: s/p left knee medial meniscectomy; left knee pain M25.562.   Surgery on   · Treatment Diagnosis: left knee pain- D95.524  Insurance/Certification information:  PT Insurance Information: Jo-Ann  Physician Information:  Referring Practitioner: Brien Todd  Has the plan of care been signed (Y/N):        [x]  Yes  []  No     Date of Patient follow up with Physician: prn      Is this a Progress Report:     [x]  Yes  []  No      Progress report/ Recertification will be due (10 Rx or 30 days whichever is less): visit 18 or 23 Jun 21        Visit # Insurance Allowable Auth Required   12 60 []  Yes []  No        Functional Scale: LEFS-80%   Date assessed: 5/25/2021    Latex Allergy:  [x]NO      []YES  Preferred Language for Healthcare:   [x]English       []other:      Pain level: 0/10    SUBJECTIVE:  Pt reports that he feels 90% back to normal.  The only thing that is not back to normal is [employer]. He feels he will be more comfortable returning to this in July. He feels comfortable returning gradually to this. Golf has been going well.        OBJECTIVE: 18 Jun 21    Observation:    Test measurements:                ROM PROM AROM Overpressure Comment     L R L R L R     Flexion      140          Extension      0                                                    Strength L R Comment   Quad 4+      Hamstring 4      Gastroc       Hip flexor 4+      Hip ABD 4+                                    RESTRICTIONS/PRECAUTIONS: WBAT    Exercises/Interventions:     Exercise/Equipment Repetitions/Resistance Other comments   Stretching     Hamstring 5x:30    Hip Flexion     ITB- Rope     Grion     Quad 5x:30    Inclined Calf 5x:30    Towel Pull     Piriformis                    SLR     Supine 3x10 7#    Prone 3x10 7#    Abduction 3x10 7#    Adducton 3x10 7#    SLR+ 5x:30 +30 pumps    clams 3x10 7# reverse     SLR + ABD          Isometrics     Quad sets     Ball Squeezes          Patellar Glides     Medial     Superior     Inferior          ROM     Weight Shift     Hang Weights     Sheet Pulls     Ankle Pumps          CKC     Calf raises     Wall sits 5x with light purple band  :30 wall sit with light purple band  1 square band walk       LSD     Step ups     1 leg stand 30 sec x 5 Airex EC   Squatting 3x10 on BOSU    CC TKE     Balance      Monster Walks     Bridging     Triple threats     Stool Scoots     Biodex - weight shift TRX knee tuck to side 3x8 ea side    BOSU hamstring bridge heel tap 3x10 bilaterally      SB roll out with hip ext into ABD 3x8 bilaterally            Walking dead lunges    Bear crawl Fwd/bwd   Lateral bear crawl    Inch worm          Lateral lunge onto BOSU 2x10 bilaterally    Lateral step up/over on BOSU 3x10              PRE     Extension SLE 10x 40#   8x 55#  6x 70#  RANGE: 90/40   Flexion SLE 10x 40#   8x 55#  6x 55#  RANGE: 0/90        Cable Column          Leg Press 10x 100# SL   8x 140#  6x 160# RANGE: 70 deg-10 deg. Bike     Treadmill 2' walk  1/ run/1/ walk x4  At 1% incline  10' total Last run at 8 mph   AlterG               Gait            Access Code: [email or web address]  URL: [email or web address]  Date: 04/29/2021  Prepared by: Pham Mcdonald Cessdidi    Exercises  Seated Table Hamstring Stretch - 2 x daily - 7 x weekly - 5 sets - 30 hold  Seated Gastroc Stretch with Strap - 2 x daily - 7 x weekly - 5 sets - 30 hold  Supine Quadricep Sets - 10 x daily - 7 x weekly - 10 sets - 1 reps - 10 hold  Supine Ankle Pumps - 10 x daily - 7 x weekly - 3 sets - 10 reps  Supine Straight Leg Raises - 2 x daily - 7 x weekly - 3 sets - 10 reps  Sidelying Hip Abduction - 2 x daily - 7 x weekly - 3 sets - 10 reps  Supine Hip Adduction Isometric with Ball - 1 x daily - 7 x weekly - 10 sets - 10 hold  Supine Heel Slide with Strap - 2 x daily - 7 x weekly - 10 sets - 10 hold    Therapeutic Exercise and NMR EXR  [x](83009) Provided verbal/tactile cueing for activities related to strengthening, flexibility, endurance, ROM for improvements in LE, proximal hip, and core control with self care, mobility, lifting, ambulation.   [x] (21249) Provided verbal/tactile cueing for activities related to improving balance, coordination, kinesthetic sense, posture, motor skill, proprioception  to assist with LE, proximal hip, and core control in self care, mobility, lifting, ambulation and eccentric single leg control. NMR and Therapeutic Activities:    [x] (71770 or 06459) Provided verbal/tactile cueing for activities related to improving balance, coordination, kinesthetic sense, posture, motor skill, proprioception and motor activation to allow for proper function of core, proximal hip and LE with self care and ADLs  [x] (64976) Gait Re-education- Provided training and instruction to the patient for proper LE, core and proximal hip recruitment and positioning and eccentric body weight control with ambulation re-education including up and down stairs     Home Exercise Program:    [x] (99886) Reviewed/Progressed HEP activities related to strengthening, flexibility, endurance, ROM of core, proximal hip and LE for functional self-care, mobility, lifting and ambulation/stair navigation   [x] (07001)Reviewed/Progressed HEP activities related to improving balance, coordination, kinesthetic sense, posture, motor skill, proprioception of core, proximal hip and LE for self care, mobility, lifting, and ambulation/stair navigation      Manual Treatments:  PROM / STM / Oscillations-Mobs:  G-I, II, III, IV (PA's, Inf., Post.)  [x](67703) Provided manual therapy to mobilize LE, proximal hip and/or LS spine soft tissue/joints for the purpose of modulating pain, promoting relaxation,  increasing ROM, reducing/eliminating soft tissue swelling/inflammation/restriction, improving soft tissue extensibility and allowing for proper ROM for normal function with self care, mobility, lifting and ambulation.      Other:        Modalities:  Declined    Charges:   Timed Code Treatment Minutes: 40'    Total Treatment Minutes: 72'        []EVAL (LOW) 455 1011   [] EVAL (MOD) 86831   []  EVAL (HIGH) 08144   []  RE-EVAL   [x]  JJ(38493) x1     []IONTO  [x]  NMR (56899) x1     []  VASO  []  Manual (68911) x      [] Other: DN not covered  [x]  TA x  1    [] [facility] (07832)  []  ES(attended) (51253)      []ES (un) (02742):     GOALS:   Patient stated goal: be back to 100% and back to training    [x]? Progressing: []? Met: []? Not Met: []? Adjusted     Therapist goals for Patient:   Short Term Goals: To be achieved in: 2 weeks  1. Independent in HEP and progression per patient tolerance, in order to prevent re-injury. []? Progressing: [x]? Met: []? Not Met: []? Adjusted   2. Patient will have a decrease in pain of 3/10 at worst to facilitate improvement in movement, function, and ADLs as indicated by functional deficits. []? Progressing: [x]? Met: []? Not Met: []? Adjusted     Long Term Goals: To be achieved in: 6 weeks  1. Pt will demo a LEFS score of 90% or better to assist with reaching prior level of function. []? Progressing: [x]? Met: []? Not Met: []? Adjusted   2. Patient will demonstrate increased AROM to knee flexion to greater than or equal to 140 and knee ext to 0 to allow for proper joint functioning as indicated by patients functional deficits. []? Progressing: [x]? Met: []? Not Met: []? Adjusted  3. Patient will demonstrate an increase in strength of hip flex, ABD, and knee flex/ext to 4+/5 to allow for proper functional mobility as indicated by patients functional deficits. [x]? Progressing: []? Met: []? Not Met: []? Adjusted  4. Patient will return to amb in the community including up/down stairs with reciprocal gt without increased symptoms or restriction. []? Progressing: [x]? Met: []? Not Met: []? Adjusted  5. Pt will return to working out without c/o pain/limitiations. [x]? Progressing: []? Met: []? Not Met: []? Adjusted          Overall Progression Towards Functional goals/ Treatment Progress Update:  [x] Patient is progressing as expected towards functional goals listed. [] Progression is slowed due to complexities/Impairments listed. [] Progression has been slowed due to co-morbidities. [] Plan just implemented, too soon to assess goals progression <30days   [] Goals require adjustment due to lack of progress  [] Patient is not progressing as expected and requires additional follow up with physician  [] Other    Prognosis for POC: [x] Good [] Fair  [] Poor      Patient requires continued skilled intervention: [x] Yes  [] No    Treatment/Activity Tolerance:  [x] Patient able to complete treatment  [] Patient limited by fatigue  [] Patient limited by pain     [] Patient limited by other medical complications  [] Other: Pt grace session well. He had no c/o t/o tx. We discussed how to progress to sport and which muscles to focus on (hamstrings). Pt does feel comfortable continuing on his own. Pt will continue strengthening and gradual return to [employer]. He can return prn and call with any questions. PLAN:  Pt will continue as HEP.   Pt can return prn.   [] Continue per plan of care [] Alter current plan (see comments above)  [] Plan of care initiated [] Hold pending MD visit [x] Discharge    Electronically signed by: Vicky Torre, PT PT, DPT [id number]

## 2021-08-03 RX ORDER — SILDENAFIL CITRATE 20 MG/1
20-40 TABLET ORAL DAILY PRN
Qty: 20 TABLET | Refills: 0 | Status: SHIPPED | OUTPATIENT
Start: 2021-08-03 | End: 2021-10-05 | Stop reason: SDUPTHER

## 2021-08-16 ENCOUNTER — PATIENT MESSAGE (OUTPATIENT)
Dept: FAMILY MEDICINE CLINIC | Age: 46
End: 2021-08-16

## 2021-08-16 ENCOUNTER — TELEPHONE (OUTPATIENT)
Dept: FAMILY MEDICINE CLINIC | Age: 46
End: 2021-08-16

## 2021-08-16 RX ORDER — VALACYCLOVIR HYDROCHLORIDE 1 G/1
TABLET, FILM COATED ORAL
Qty: 4 TABLET | Refills: 0 | Status: SHIPPED | OUTPATIENT
Start: 2021-08-16

## 2021-08-16 NOTE — TELEPHONE ENCOUNTER
We have seen fully vaccinated people get the new delta variant, so it is likely that he has it. We do not do COVID testing at our office.   He can call around to the pharmacy clinics or Urgent Care to see who has the PCR test.

## 2021-08-16 NOTE — TELEPHONE ENCOUNTER
PT informed and verbalized understanding of message below. Pt states he has tested positive for Delta Variant.

## 2021-08-16 NOTE — TELEPHONE ENCOUNTER
----- Message from [employer] sent at 8/16/2021  9:32 AM EDT -----  Subject: Message to Provider    QUESTIONS  Information for Provider? Pt received an Positive Covid test result at   urgent care over weekend. PT had a rapid test done and want to have a PCR   test done instead. Pt believes he just has a sinus infection. PT has been   congested. No fever, chills, fatigue etc. Pt is Vaccinated w/ Moderna. ---------------------------------------------------------------------------  --------------  Yessenia BAPTISTE  What is the best way for the office to contact you? OK to respond with   electronic message via [employer] portal (only for patients who have   registered [employer] account)  Preferred Call Back Phone Number? [phone]  ---------------------------------------------------------------------------  --------------  SCRIPT ANSWERS  Relationship to Patient?  Self

## 2021-08-16 NOTE — TELEPHONE ENCOUNTER
From: Janice Albrecht  To: Elina Stearns DO  Sent: 8/16/2021 11:52 AM EDT  Subject: Prescription Question    Today I started to develop a cold sore on my lip. I was wondering if I could get a Valtrex prescription sent in.

## 2021-09-07 DIAGNOSIS — R79.89 LOW TESTOSTERONE: Primary | ICD-10-CM

## 2021-09-24 ENCOUNTER — VIRTUAL VISIT (OUTPATIENT)
Dept: ENDOCRINOLOGY | Age: 46
End: 2021-09-24

## 2021-09-24 DIAGNOSIS — R79.89 LOW TESTOSTERONE: Primary | ICD-10-CM

## 2021-09-28 ENCOUNTER — OFFICE VISIT (OUTPATIENT)
Dept: ENDOCRINOLOGY | Age: 46
End: 2021-09-28
Payer: COMMERCIAL

## 2021-09-28 VITALS
HEIGHT: 72 IN | WEIGHT: 191.2 LBS | SYSTOLIC BLOOD PRESSURE: 130 MMHG | DIASTOLIC BLOOD PRESSURE: 80 MMHG | HEART RATE: 79 BPM | OXYGEN SATURATION: 98 % | BODY MASS INDEX: 25.9 KG/M2

## 2021-09-28 DIAGNOSIS — R53.83 OTHER FATIGUE: ICD-10-CM

## 2021-09-28 DIAGNOSIS — R79.89 LOW TESTOSTERONE: Primary | ICD-10-CM

## 2021-09-28 PROCEDURE — 99214 OFFICE O/P EST MOD 30 MIN: CPT | Performed by: INTERNAL MEDICINE

## 2021-09-28 RX ORDER — TESTOSTERONE CYPIONATE 200 MG/ML
INJECTION INTRAMUSCULAR
Qty: 2 ML | Refills: 5 | Status: SHIPPED | OUTPATIENT
Start: 2021-09-28 | End: 2022-02-14

## 2021-09-28 NOTE — PROGRESS NOTES
43 Y/o WM seen for evaluation of hypogonadism      Interim:      2/20 testosterone stopped  C/o fatigue, sleepy, lack of motivated, loss of libido  Having a lot of caffeine    He has a h/o low testosterone for 2 years. Was seeing Dr. SEGOVIA [facility] [place]. He retired. Does not remember the cause for low testosterone    Mild, stable, controlled    He was getting testosterone injections weekly. felt a lot better  Main symptoms : Fatigue, lack of motivation, reduced libido, ED    Level: 588 on 12/14    Treatment was stopped 5/17 as MD retired, symptoms came back    He denies any change in shaving frequency, no change in testicular or penile size. No gynecomastia, denies any breast discharge. There is no previous h/o mumps, no h/u use of narcotics or anabolic steroids. No h/o iron disorders in the family. Children : None  - does not plan for children    No h/o chemotherapy, radiation therapy, excessive alcohol. No h/o anosmia    7/17 Testosterone 390 LH/FSH/ Prolactin, B12/Vitamin D nl  Discussed with patient, level is normal so would not recommend replacement. Will repeat one more. He does have ED, start cialis    9/17 Testosterone 377   trial of clomid,25mg three times a week, did not feel better.     4/18  Testosterone 800 HCT  42  Peak level  On 100mg every 2 weeks  10/18 Testosterone 397  4 days after injection  3/19 Testosterone 692  HCT 43.9 mid cycle  level   150mg every 2 weeks  9/19 Testosterone 865   Mid cycle  2/20 Testosterone 876  Trough level    7/20  Testosterone 438     off testosterone    9/21 Testosterone  87    Past Medical History:   Diagnosis Date    Anxiety     Chronic seasonal allergic rhinitis due to pollen     Condyloma acuminata     Erectile dysfunction of organic origin     Herpes dermatitis     Nostril    Irritable bowel syndrome with diarrhea     Major depression single episode, in partial remission ([facility] [address]      no meds    Testosterone deficiency      Past Surgical History:   Procedure Laterality Date    CARPAL TUNNEL RELEASE Right 12/20/2018    RIGHT CARPAL TUNNEL [facility]LEASE [facility] [address] [facility] [facility]  [facility]    ELBOW FRACTURE SURGERY Left Age 7 or 8    HAND SURGERY Right 2010    Thumb ORIF    KNEE ARTHROSCOPY Left 4/28/2021    LEFT KNEE ARTHROSCOPY, MEDIAL MENISCECTOMY [facility].  CO REPAIR MAJOR PERIPHERAL NERVE Right 12/20/2018    RIGHT ULNAR NERVE DECOMPRESSION AT ELBOW performed by [facility]isse [facility] at [facility] Right 2000    WRIST SURGERY Right 2009    cyst removed    WRIST SURGERY Left 2012    Cyst removal     Current Outpatient Medications   Medication Sig Dispense Refill    valACYclovir (VALTREX) 1 g tablet TAKE 2 TABLETS TWICE DAILY FOR ONE DAY FOR OUTBREAKS. 4 tablet 0    sildenafil (REVATIO) 20 MG tablet Take 1-2 tablets by mouth daily as needed (erectile dysfunction) 20 tablet 0    ibuprofen (ADVIL;MOTRIN) 200 MG tablet Take 400 mg by mouth as needed for Pain       Ascorbic Acid (VITAMIN C) 500 MG CAPS Take 1 tablet by mouth daily      Cholecalciferol (VITAMIN D3) 125 MCG (5000 UT) TABS Take 1 tablet by mouth daily      Probiotic Product (PROBIOTIC DAILY PO) Take 1 tablet by mouth as needed        No current facility-administered medications for this visit. Review of Systems  Scanned, reviewed        PHYSICAL EXAMINATION:    Vitals:    09/28/21 1600   BP: 130/80   Pulse: 79   SpO2: 98%       Constitutional: Well-developed, appears stated age, cooperative, in no acute distress  H/E/N/M/T:atraumatic, normocephalic, external ears, nose, lips normal without lesions  Eyes: Lids, lashes, conjunctivae and sclerae normal, No proptosis, no redness  Neck: supple, symmetrical, no swelling  Skin: No obvious rashes or lesions present.   Skin and hair texture normal  Psychiatric: Judgement and Insight:  judgement and insight appear normal  Neuro: Normal without focal findings, speech is normal normal, speech is spontaneous  Chest: No labored breathing, no chest deformity, no stridor  Musculoskeletal: No joint deformity, swelling    Lab Reviewed       Assessment: 1. Low Testosterone: Symptomatic, levels were low normal, clomid did not help,he was  on low dose testosterone injection given previous history. Discussed side effect of replacement therapy. He felt better, symptoms improved, levels improved. Discussed if plans to continue or hold depending on symptoms, held. Level were normal off testosterone. Now very low, given < 150 ,order MRI to r/o adenoma  2. ED: Improved with cialis  3. Fatigue/Lack of motivation:  Reassess after testosterone  He saw therapist    Plan: 1. MRI pituitary  2. Testosterone 150mg IM every weeks

## 2021-10-04 ENCOUNTER — HOSPITAL ENCOUNTER (OUTPATIENT)
Dept: MRI IMAGING | Age: 46
Discharge: HOME | End: 2021-10-04
Payer: COMMERCIAL

## 2021-10-04 DIAGNOSIS — R79.89 LOW TESTOSTERONE: ICD-10-CM

## 2021-10-04 PROCEDURE — 70553 MRI BRAIN STEM W/O & W/DYE: CPT

## 2021-10-04 PROCEDURE — 6360000004 HC RX CONTRAST MEDICATION: Performed by: INTERNAL MEDICINE

## 2021-10-04 PROCEDURE — A9579 GAD-BASE MR CONTRAST NOS,1ML: HCPCS | Performed by: INTERNAL MEDICINE

## 2021-10-04 RX ADMIN — GADOTERIDOL 17 ML: 279.3 INJECTION, SOLUTION INTRAVENOUS at 13:15

## 2021-10-11 ENCOUNTER — NURSE ONLY (OUTPATIENT)
Dept: ENDOCRINOLOGY | Age: 46
End: 2021-10-11
Payer: COMMERCIAL

## 2021-10-11 DIAGNOSIS — R79.89 LOW TESTOSTERONE: Primary | ICD-10-CM

## 2021-10-11 PROCEDURE — 96372 THER/PROPH/DIAG INJ SC/IM: CPT | Performed by: INTERNAL MEDICINE

## 2021-10-11 RX ORDER — TESTOSTERONE CYPIONATE 200 MG/ML
200 INJECTION INTRAMUSCULAR ONCE
Status: COMPLETED | OUTPATIENT
Start: 2021-10-11 | End: 2021-10-11

## 2021-10-11 RX ADMIN — TESTOSTERONE CYPIONATE 200 MG: 200 INJECTION INTRAMUSCULAR at 13:34

## 2021-10-25 ENCOUNTER — NURSE ONLY (OUTPATIENT)
Dept: ENDOCRINOLOGY | Age: 46
End: 2021-10-25
Payer: COMMERCIAL

## 2021-10-25 DIAGNOSIS — R79.89 LOW TESTOSTERONE: Primary | ICD-10-CM

## 2021-10-25 PROCEDURE — 96372 THER/PROPH/DIAG INJ SC/IM: CPT | Performed by: INTERNAL MEDICINE

## 2021-10-25 RX ORDER — TESTOSTERONE CYPIONATE 200 MG/ML
200 INJECTION INTRAMUSCULAR ONCE
Status: COMPLETED | OUTPATIENT
Start: 2021-10-25 | End: 2021-10-25

## 2021-10-25 RX ADMIN — TESTOSTERONE CYPIONATE 200 MG: 200 INJECTION INTRAMUSCULAR at 14:52

## 2021-10-25 NOTE — PROGRESS NOTES
Testosterone given per physician order. Patient supplied the medication. If any signs of redness, rash, swelling or unusual symptoms occur please call the office.   [address] [phone]  LOT [id number]  Ext 10/2023  Given in left hip

## 2021-11-08 ENCOUNTER — NURSE ONLY (OUTPATIENT)
Dept: ENDOCRINOLOGY | Age: 46
End: 2021-11-08
Payer: COMMERCIAL

## 2021-11-08 DIAGNOSIS — R79.89 LOW TESTOSTERONE: Primary | ICD-10-CM

## 2021-11-08 PROCEDURE — 96372 THER/PROPH/DIAG INJ SC/IM: CPT | Performed by: INTERNAL MEDICINE

## 2021-11-08 RX ORDER — TESTOSTERONE CYPIONATE 200 MG/ML
200 INJECTION INTRAMUSCULAR ONCE
Status: COMPLETED | OUTPATIENT
Start: 2021-11-08 | End: 2021-11-08

## 2021-11-08 RX ADMIN — TESTOSTERONE CYPIONATE 200 MG: 200 INJECTION INTRAMUSCULAR at 11:31

## 2021-11-08 NOTE — PROGRESS NOTES
Testosterone given per physician order. Patient supplied the medication. If any signs of redness, rash, swelling or unusual symptoms occur please call the office.   [facility] [id number]  Lot [id number]  Exp 10/2023     Given in left hip

## 2021-11-22 ENCOUNTER — NURSE ONLY (OUTPATIENT)
Dept: ENDOCRINOLOGY | Age: 46
End: 2021-11-22
Payer: COMMERCIAL

## 2021-11-22 DIAGNOSIS — E34.9 TESTOSTERONE DEFICIENCY: ICD-10-CM

## 2021-11-22 PROCEDURE — 96372 THER/PROPH/DIAG INJ SC/IM: CPT | Performed by: INTERNAL MEDICINE

## 2021-11-22 RX ORDER — TESTOSTERONE CYPIONATE 200 MG/ML
150 INJECTION INTRAMUSCULAR ONCE
Status: COMPLETED | OUTPATIENT
Start: 2021-11-22 | End: 2021-11-22

## 2021-11-22 RX ADMIN — TESTOSTERONE CYPIONATE 150 MG: 200 INJECTION INTRAMUSCULAR at 11:09

## 2021-11-22 NOTE — PROGRESS NOTES
Testosterone given per physician order. Patient supplied the medication. If any signs of redness, rash, swelling or unusual symptoms occur please call the office.     ndc [phone]    [id number]    10/2023

## 2021-12-09 ENCOUNTER — NURSE ONLY (OUTPATIENT)
Dept: ENDOCRINOLOGY | Age: 46
End: 2021-12-09
Payer: COMMERCIAL

## 2021-12-09 DIAGNOSIS — E34.9 TESTOSTERONE DEFICIENCY: ICD-10-CM

## 2021-12-09 PROCEDURE — 96372 THER/PROPH/DIAG INJ SC/IM: CPT | Performed by: INTERNAL MEDICINE

## 2021-12-09 RX ORDER — TESTOSTERONE CYPIONATE 200 MG/ML
150 INJECTION INTRAMUSCULAR ONCE
Status: COMPLETED | OUTPATIENT
Start: 2021-12-09 | End: 2021-12-09

## 2021-12-09 RX ADMIN — TESTOSTERONE CYPIONATE 150 MG: 200 INJECTION INTRAMUSCULAR at 10:58

## 2021-12-09 NOTE — PROGRESS NOTES
Testosterone given per physician order. Patient supplied the medication. If any signs of redness, rash, swelling or unusual symptoms occur please call the office. [address] [phone]    Lot [id number]    EXP. 10/2023

## 2021-12-09 NOTE — PATIENT INSTRUCTIONS
Testosterone given per physician order. Patient supplied the medication. If any signs of redness, rash, swelling or unusual symptoms occur please call the office. [address] [phone]    Lot [id number]    EXP. 10/2023

## 2021-12-19 NOTE — PROGRESS NOTES
Subjective:      Patient ID: Mechelle Fallon is a 40 y.o. male. HPI     Possible UTI:  Patient states that 5 days ago, he noticed some urinary frequency and sensation of incomplete voiding. He went to Urgent Care and gave a urine sample but has been told that they never got the result back. He does complain of some dribbling of urine and some pain at the tip of the penis with voiding but denies discharge. Anxiety:  Patient requested a refill of Xanax. He got 21 on 4-27-21 and it has lasted until now. He feels that it works well when taken. Review of Systems  /88   Pulse 103   Ht 6' (1.829 m)   Wt 189 lb 6.4 oz (85.9 kg)   SpO2 98%   BMI 25.69 kg/m²    Objective:   Physical Exam  Vitals reviewed. Constitutional:       General: He is not in acute distress. Appearance: He is well-developed. HENT:      Head: Normocephalic. Right Ear: External ear normal.      Left Ear: External ear normal.   Neck:      Thyroid: No thyromegaly. Vascular: No carotid bruit or JVD. Cardiovascular:      Rate and Rhythm: Normal rate and regular rhythm. Heart sounds: Normal heart sounds. No murmur heard. Pulmonary:      Effort: Pulmonary effort is normal.      Breath sounds: Normal breath sounds. No wheezing or rales. Lymphadenopathy:      Cervical: No cervical adenopathy. Neurological:      Mental Status: He is alert and oriented to person, place, and time. Assessment:      Dysuria   BPH  Anxiety       Plan:      UA: 1.025 / 6.0 and negative dip. Sent for Urine GC and Chlamydia   Rx Flomax 0.4 mg once daily. Rx Xanax 0.5 mg TID prn anxiety #21. If symptoms do not improve with Flomax, will send to Urology.    Flu Shot Declined   I recommended the COVID vaccines   Referral given for Colonoscopy   RTO as needed         2338 Kira Yost DO

## 2021-12-20 ENCOUNTER — OFFICE VISIT (OUTPATIENT)
Dept: FAMILY MEDICINE CLINIC | Age: 46
End: 2021-12-20
Payer: COMMERCIAL

## 2021-12-20 ENCOUNTER — NURSE ONLY (OUTPATIENT)
Dept: ENDOCRINOLOGY | Age: 46
End: 2021-12-20
Payer: COMMERCIAL

## 2021-12-20 VITALS
HEIGHT: 72 IN | DIASTOLIC BLOOD PRESSURE: 88 MMHG | BODY MASS INDEX: 25.65 KG/M2 | WEIGHT: 189.4 LBS | SYSTOLIC BLOOD PRESSURE: 134 MMHG | OXYGEN SATURATION: 98 % | HEART RATE: 103 BPM

## 2021-12-20 DIAGNOSIS — N40.1 BPH WITH URINARY OBSTRUCTION: Primary | ICD-10-CM

## 2021-12-20 DIAGNOSIS — Z23 NEED FOR INFLUENZA VACCINATION: ICD-10-CM

## 2021-12-20 DIAGNOSIS — Z00.00 PREVENTATIVE HEALTH CARE: ICD-10-CM

## 2021-12-20 DIAGNOSIS — F41.9 ANXIETY: ICD-10-CM

## 2021-12-20 DIAGNOSIS — R30.0 DYSURIA: ICD-10-CM

## 2021-12-20 DIAGNOSIS — N13.8 BPH WITH URINARY OBSTRUCTION: Primary | ICD-10-CM

## 2021-12-20 DIAGNOSIS — E34.9 TESTOSTERONE DEFICIENCY: Primary | ICD-10-CM

## 2021-12-20 LAB
BILIRUBIN, POC: NORMAL
BLOOD URINE, POC: NORMAL
CLARITY, POC: CLEAR
COLOR, POC: CLEAR
GLUCOSE URINE, POC: NORMAL
KETONES, POC: NORMAL
LEUKOCYTE EST, POC: NORMAL
NITRITE, POC: NORMAL
PH, POC: 6
PROTEIN, POC: NORMAL
SPECIFIC GRAVITY, POC: 1.02
UROBILINOGEN, POC: NORMAL

## 2021-12-20 PROCEDURE — 81002 URINALYSIS NONAUTO W/O SCOPE: CPT | Performed by: FAMILY MEDICINE

## 2021-12-20 PROCEDURE — 99214 OFFICE O/P EST MOD 30 MIN: CPT | Performed by: FAMILY MEDICINE

## 2021-12-20 PROCEDURE — 96372 THER/PROPH/DIAG INJ SC/IM: CPT | Performed by: INTERNAL MEDICINE

## 2021-12-20 RX ORDER — TAMSULOSIN HYDROCHLORIDE 0.4 MG/1
0.4 CAPSULE ORAL DAILY
Qty: 30 CAPSULE | Refills: 5 | Status: SHIPPED | OUTPATIENT
Start: 2021-12-20 | End: 2022-11-03

## 2021-12-20 RX ORDER — SULFAMETHOXAZOLE AND TRIMETHOPRIM 800; 160 MG/1; MG/1
TABLET ORAL
COMMUNITY
Start: 2021-12-16 | End: 2022-01-18

## 2021-12-20 RX ORDER — ALPRAZOLAM 0.5 MG/1
0.5 TABLET ORAL 3 TIMES DAILY PRN
Qty: 21 TABLET | Refills: 0 | Status: SHIPPED | OUTPATIENT
Start: 2021-12-20 | End: 2022-11-03 | Stop reason: SDUPTHER

## 2021-12-20 RX ORDER — TESTOSTERONE CYPIONATE 200 MG/ML
200 INJECTION INTRAMUSCULAR ONCE
Status: COMPLETED | OUTPATIENT
Start: 2021-12-20 | End: 2021-12-20

## 2021-12-20 RX ADMIN — TESTOSTERONE CYPIONATE 200 MG: 200 INJECTION INTRAMUSCULAR at 11:38

## 2021-12-20 SDOH — ECONOMIC STABILITY: FOOD INSECURITY: WITHIN THE PAST 12 MONTHS, THE FOOD YOU BOUGHT JUST DIDN'T LAST AND YOU DIDN'T HAVE MONEY TO GET MORE.: NEVER TRUE

## 2021-12-20 SDOH — ECONOMIC STABILITY: FOOD INSECURITY: WITHIN THE PAST 12 MONTHS, YOU WORRIED THAT YOUR FOOD WOULD RUN OUT BEFORE YOU GOT MONEY TO BUY MORE.: NEVER TRUE

## 2021-12-20 ASSESSMENT — SOCIAL DETERMINANTS OF HEALTH (SDOH): HOW HARD IS IT FOR YOU TO PAY FOR THE VERY BASICS LIKE FOOD, HOUSING, MEDICAL CARE, AND HEATING?: NOT HARD AT ALL

## 2021-12-20 NOTE — PROGRESS NOTES
Testosterone given per physician order. Patient supplied the medication. If any signs of redness, rash, swelling or unusual symptoms occur please call the office.   [address] [phone]  Lot [id number]  Exp 03/2021

## 2021-12-21 LAB
C. TRACHOMATIS DNA ,URINE: NEGATIVE
N. GONORRHOEAE DNA, URINE: NEGATIVE

## 2021-12-24 ENCOUNTER — APPOINTMENT (OUTPATIENT)
Dept: CT IMAGING | Age: 46
End: 2021-12-24
Payer: COMMERCIAL

## 2021-12-24 ENCOUNTER — HOSPITAL ENCOUNTER (EMERGENCY)
Age: 46
Discharge: HOME | End: 2021-12-25
Attending: EMERGENCY MEDICINE
Payer: COMMERCIAL

## 2021-12-24 VITALS
TEMPERATURE: 97.7 F | SYSTOLIC BLOOD PRESSURE: 139 MMHG | BODY MASS INDEX: 25.06 KG/M2 | HEART RATE: 101 BPM | HEIGHT: 72 IN | DIASTOLIC BLOOD PRESSURE: 89 MMHG | RESPIRATION RATE: 18 BRPM | OXYGEN SATURATION: 99 % | WEIGHT: 185 LBS

## 2021-12-24 DIAGNOSIS — S62.602A FRACTURE OF UNSPECIFIED PHALANX OF RIGHT MIDDLE FINGER, INITIAL ENCOUNTER FOR CLOSED FRACTURE: ICD-10-CM
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
## 2021-12-24 PROCEDURE — 70450 CT HEAD/BRAIN W/O DYE: CPT

## 2021-12-24 PROCEDURE — 99283 EMERGENCY DEPT VISIT LOW MDM: CPT

## 2021-12-24 PROCEDURE — 72125 CT NECK SPINE W/O DYE: CPT

## 2021-12-24 ASSESSMENT — PAIN DESCRIPTION - ORIENTATION: ORIENTATION: RIGHT

## 2021-12-24 ASSESSMENT — PAIN DESCRIPTION - ONSET: ONSET: GRADUAL

## 2021-12-24 ASSESSMENT — PAIN DESCRIPTION - PAIN TYPE: TYPE: ACUTE PAIN

## 2021-12-24 ASSESSMENT — PAIN DESCRIPTION - DESCRIPTORS: DESCRIPTORS: ACHING

## 2021-12-24 ASSESSMENT — PAIN SCALES - GENERAL: PAINLEVEL_OUTOF10: 8

## 2021-12-24 ASSESSMENT — PAIN DESCRIPTION - LOCATION: LOCATION: WRIST;BACK;NECK

## 2021-12-24 ASSESSMENT — PAIN DESCRIPTION - FREQUENCY: FREQUENCY: CONTINUOUS

## 2021-12-25 ENCOUNTER — APPOINTMENT (OUTPATIENT)
Dept: GENERAL RADIOLOGY | Age: 46
End: 2021-12-25
Payer: COMMERCIAL

## 2021-12-25 ENCOUNTER — APPOINTMENT (OUTPATIENT)
Dept: CT IMAGING | Age: 46
End: 2021-12-25
Payer: COMMERCIAL

## 2021-12-25 PROCEDURE — 73130 X-RAY EXAM OF HAND: CPT

## 2021-12-25 PROCEDURE — 71045 X-RAY EXAM CHEST 1 VIEW: CPT

## 2021-12-25 PROCEDURE — 72170 X-RAY EXAM OF PELVIS: CPT

## 2021-12-25 PROCEDURE — 73030 X-RAY EXAM OF SHOULDER: CPT

## 2021-12-25 PROCEDURE — 3209999900 CT THORACIC SPINE TRAUMA RECONSTRUCTION

## 2021-12-25 PROCEDURE — 71250 CT THORAX DX C-: CPT

## 2021-12-25 ASSESSMENT — ENCOUNTER SYMPTOMS
ABDOMINAL PAIN: 0
DIARRHEA: 0
BACK PAIN: 1
SHORTNESS OF BREATH: 0
EYES NEGATIVE: 1
COLOR CHANGE: 0
SORE THROAT: 0
VOMITING: 0
COUGH: 0
NAUSEA: 0
RHINORRHEA: 0
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
## 2021-12-28 ENCOUNTER — OFFICE VISIT (OUTPATIENT)
Dept: ORTHOPEDIC SURGERY | Age: 46
End: 2021-12-28
Payer: COMMERCIAL

## 2021-12-28 ENCOUNTER — OFFICE VISIT (OUTPATIENT)
Dept: FAMILY MEDICINE CLINIC | Age: 46
End: 2021-12-28
Payer: COMMERCIAL

## 2021-12-28 VITALS
DIASTOLIC BLOOD PRESSURE: 88 MMHG | HEIGHT: 72 IN | WEIGHT: 191.8 LBS | SYSTOLIC BLOOD PRESSURE: 136 MMHG | BODY MASS INDEX: 25.98 KG/M2

## 2021-12-28 DIAGNOSIS — Z00.00 PREVENTATIVE HEALTH CARE: ICD-10-CM

## 2021-12-28 DIAGNOSIS — S46.912A STRAIN OF LEFT SHOULDER, INITIAL ENCOUNTER: Primary | ICD-10-CM

## 2021-12-28 DIAGNOSIS — Z23 NEED FOR INFLUENZA VACCINATION: ICD-10-CM

## 2021-12-28 DIAGNOSIS — S62.612A CLOSED DISPLACED FRACTURE OF PROXIMAL PHALANX OF RIGHT MIDDLE FINGER, INITIAL ENCOUNTER: Primary | ICD-10-CM
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
## 2021-12-28 DIAGNOSIS — S62.652A CLOSED NONDISPLACED FRACTURE OF MIDDLE PHALANX OF RIGHT MIDDLE FINGER, INITIAL ENCOUNTER: ICD-10-CM

## 2021-12-28 DIAGNOSIS — S62.642P: ICD-10-CM

## 2021-12-28 PROCEDURE — 99214 OFFICE O/P EST MOD 30 MIN: CPT | Performed by: NURSE PRACTITIONER

## 2021-12-28 PROCEDURE — 99213 OFFICE O/P EST LOW 20 MIN: CPT | Performed by: FAMILY MEDICINE

## 2021-12-28 PROCEDURE — 26740 TREAT FINGER FRACTURE EACH: CPT | Performed by: NURSE PRACTITIONER

## 2021-12-28 SDOH — HEALTH STABILITY: PHYSICAL HEALTH: ON AVERAGE, HOW MANY DAYS PER WEEK DO YOU ENGAGE IN MODERATE TO STRENUOUS EXERCISE (LIKE A BRISK WALK)?: 4 DAYS

## 2021-12-28 SDOH — HEALTH STABILITY: PHYSICAL HEALTH: ON AVERAGE, HOW MANY MINUTES DO YOU ENGAGE IN EXERCISE AT THIS LEVEL?: 60 MIN
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
## 2022-01-03 ENCOUNTER — NURSE ONLY (OUTPATIENT)
Dept: ENDOCRINOLOGY | Age: 47
End: 2022-01-03
Payer: COMMERCIAL

## 2022-01-03 DIAGNOSIS — E34.9 TESTOSTERONE DEFICIENCY: ICD-10-CM

## 2022-01-03 RX ORDER — TESTOSTERONE CYPIONATE 200 MG/ML
200 INJECTION INTRAMUSCULAR ONCE
Status: COMPLETED | OUTPATIENT
Start: 2022-01-03 | End: 2022-01-03

## 2022-01-03 RX ADMIN — TESTOSTERONE CYPIONATE 200 MG: 200 INJECTION INTRAMUSCULAR at 11:01

## 2022-01-03 NOTE — PROGRESS NOTES
Testosterone given per physician order. Patient supplied the medication. If any signs of redness, rash, swelling or unusual symptoms occur please call the office.     IldefonsoMoncho Leblanc [id number]  Lot [id number]  Exp 10/2023    Administered 150mg (0.75ml)

## 2022-01-17 ENCOUNTER — NURSE ONLY (OUTPATIENT)
Dept: ENDOCRINOLOGY | Age: 47
End: 2022-01-17
Payer: COMMERCIAL

## 2022-01-17 DIAGNOSIS — E34.9 TESTOSTERONE DEFICIENCY: Primary | ICD-10-CM

## 2022-01-17 PROCEDURE — 96372 THER/PROPH/DIAG INJ SC/IM: CPT | Performed by: INTERNAL MEDICINE

## 2022-01-17 RX ORDER — TESTOSTERONE CYPIONATE 100 MG/ML
100 INJECTION, SOLUTION INTRAMUSCULAR ONCE
Status: COMPLETED | OUTPATIENT
Start: 2022-01-17 | End: 2022-01-17

## 2022-01-17 RX ADMIN — TESTOSTERONE CYPIONATE 100 MG: 100 INJECTION, SOLUTION INTRAMUSCULAR at 15:16

## 2022-01-17 NOTE — PROGRESS NOTES
Testosterone given per physician order. Patient supplied the medication. If any signs of redness, rash, swelling or unusual symptoms occur please call the office.   Left hip

## 2022-01-18 ENCOUNTER — OFFICE VISIT (OUTPATIENT)
Dept: ORTHOPEDIC SURGERY | Age: 47
End: 2022-01-18
Payer: COMMERCIAL

## 2022-01-18 VITALS — RESPIRATION RATE: 12 BRPM | WEIGHT: 189 LBS | HEIGHT: 72 IN | BODY MASS INDEX: 25.6 KG/M2

## 2022-01-18 DIAGNOSIS — M25.512 ACUTE PAIN OF LEFT SHOULDER: Primary | ICD-10-CM

## 2022-01-18 PROCEDURE — 99214 OFFICE O/P EST MOD 30 MIN: CPT | Performed by: ORTHOPAEDIC SURGERY
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
## 2022-01-21 ENCOUNTER — OFFICE VISIT (OUTPATIENT)
Dept: ORTHOPEDIC SURGERY | Age: 47
End: 2022-01-21
Payer: COMMERCIAL

## 2022-01-21 VITALS — WEIGHT: 189 LBS | RESPIRATION RATE: 12 BRPM | BODY MASS INDEX: 25.6 KG/M2 | HEIGHT: 72 IN

## 2022-01-21 DIAGNOSIS — S43.432A SUPERIOR LABRUM ANTERIOR-TO-POSTERIOR (SLAP) TEAR OF LEFT SHOULDER: ICD-10-CM

## 2022-01-21 DIAGNOSIS — M75.22 BICEPS TENDINITIS ON LEFT: ICD-10-CM

## 2022-01-21 DIAGNOSIS — M25.512 ACUTE PAIN OF LEFT SHOULDER: Primary | ICD-10-CM

## 2022-01-21 PROCEDURE — L3670 SO ACRO/CLAV CAN WEB PRE OTS: HCPCS | Performed by: ORTHOPAEDIC SURGERY

## 2022-01-21 PROCEDURE — 99214 OFFICE O/P EST MOD 30 MIN: CPT | Performed by: ORTHOPAEDIC SURGERY

## 2022-01-21 NOTE — PROGRESS NOTES
Franny Hernandez returns today to follow-up his left shoulder. Unfortunately he has had a fairly significant amount of pain especially at night. His pain is anterior and lateral.    General Exam:    Vitals: Resp. rate 12, height 6' (1.829 m), weight 189 lb (85.7 kg). Constitutional: Patient is adequately groomed with no evidence of malnutrition  Mental Status: The patient is oriented to time, place and person. The patient's mood and affect are appropriate. Left shoulder today has tenderness over the long head bicep and pain with Mcfarland and O'Briens maneuvers. He also has mild pain stressing the supraspinatus but full strength throughout the cuff otherwise. Left shoulder MRI is reviewed. It demonstrates     TECHNICAL FACTORS:  Long- and short-axis fat- and water-weighted images were performed.       COMPARISON:  None.       FINDINGS:  AC arthropathy is mild.  Anterior tilting acromion.       Cuff tendinosis.  Slit-like interstitial laminar fraying subscapularis insertion 11mm.       Tendinopathic biceps arcuate segment.       SLAP type 2A tear.  Small pseudocysts superior glenoid rim.  Capsulitis.  Labrum tear    propagates anteriorly and inferiorly as a flap tear.  Small paralabral cysts anteroinferiorly.       No soft tissue mass.       CONCLUSION:   1. SLAP type 2A tear propagates anteriorly and inferiorly. Small paralabral cysts    anteroinferiorly. 2. Slit-like 11mm partial thickness interstitial laminar tear subscapularis insertion. The tear    less than 20% of the tendon thickness. 3. AC arthropathy with low lying acromion. Lateral arch narrowing. Reviewed these findings on report and the images with the patient. Assessment: Left shoulder bicep tendinitis with SLAP tear    Plan: He is very active and enjoys weightlifting as well as martial arts. He presented options of observation, therapy, or consideration of surgery.   Surgery involve the left shoulder arthroscopy with debridement and bicep tenodesis. We explained that we do bicep tenodesis in lieu ofl SLAP repair in most patients over the age of 27 especially those who are weightlifters and active. We discussed the risk, benefits, and alternatives to surgery at length. We will be in a sling for 4 weeks and full recovery on the order of 3 months. All of his questions have been answered. He will need preoperative medical clearance. I will see him in the operating room at some point in the next several weeks. Procedures    DJO ultrasling IV Shoulder Sling     Patient was prescribed a DJO Ultrasling IV Shoulder Brace. The left shoulder will require stabilization / immobilization from this orthosis. The orthosis will assist in protecting the affected area, provide functional support and facilitate healing. The device was ordered and fit on 01-21-22. The patient was educated and fit by a healthcare professional with expert knowledge and specialization in brace application while under the direct supervision of the treating physician. Verbal and written instructions for the use of and application of this item were provided. They were instructed to contact the office immediately should the brace result in increased pain, decreased sensation, increased swelling or worsening of the condition.      Medical decision making today was moderate

## 2022-01-24 NOTE — PROGRESS NOTES
Preoperative Screening for Elective Surgery/Invasive Procedures While COVID-19 present in the community     1. Have you tested positive or have been told to self-isolate for COVID-19 like symptoms within the past 28 days? no-TESTED POSITIVE 8/2021  2. Do you currently have any of the following symptoms?no  ? Fever >100.0 F or 99.9 F in immunocompromised patients? NO  ? New onset cough, shortness of breath or difficulty breathing? NO  ? New onset sore throat, myalgia (muscle aches and pains), headache, loss of taste/smell or diarrhea? NO  3. Have you had a potential exposure to COVID-19 within the past 14 days by:no  ? Close contact with a confirmed case? NO  ? Close contact with a healthcare worker,  or essential infrastructure worker (grocery store, [employer], gas station, public utilities or transportation)?no  ? Do you reside in a congregate setting such as; skilled nursing facility, adult home, correctional facility, homeless shelter or other institutional setting? NO  ? Have you had recent travel to a known COVID-19 hotspot? NO     Indicate if the patient has a positive screen by answering yes to one or more of the above questions. C-Difficile admission screening and protocol:       * Admitted with diarrhea? [] YES    [x]  NO     *Prior history of C-Diff. In last 3 months? [] YES    [x]  NO     *Antibiotic use in the past 6-8 weeks? [x]  NO    []  YES                 If yes, which ANTIBIOTIC AND REASON______     *Prior hospitalization or nursing home in the last month? []  YES    [x]  NO        [address] [place] [address] PRE-OPERATIVE INSTRUCTIONS    Arrival time__0600__________        Surgery time__0730__________    Take the following medications with a sip of water: Follow your MD/Surgeons pre-procedure instructions regarding your medications    Do not eat or drink anything after 12:00 midnight prior to your surgery.   This includes water chewing gum, mints and ice chips.   You may brush your teeth and gargle the morning of your surgery, but do not swallow the water     Please see your family doctor/pediatrician for a history and physical and/or concerning medications. Bring any test results/reports from your physicians office. If you are under the care of a heart doctor or specialist doctor, please be aware that you may be asked to them for clearance    You may be asked to stop blood thinners such as Coumadin, Plavix, Fragmin, Lovenox, etc., or any anti-inflammatories such as:  Aspirin, Ibuprofen, Advil, Naproxen prior to your surgery. We also ask that you stop any OTC medications such as fish oil, vitamin E, glucosamine, garlic, Multivitamins, COQ 10, etc. MAY TAKE TYLENOL    We ask that you do not smoke 24 hours prior to surgery  We ask that you do not  drink any alcoholic beverages 24 hours prior to surgery     You must make arrangements for a responsible adult to take you home after your surgery. For your safety you will not be allowed to leave alone or drive yourself home. Your surgery will be cancelled if you do not have a ride home. Also for your safety, it is strongly suggested that someone stay with you the first 24 hours after your surgery. A parent or legal guardian must accompany a child scheduled for surgery and plan to stay at the hospital until the child is discharged. Please do not bring other children with you. For your comfort, please wear simple loose fitting clothing to the hospital.  Please do not bring valuables. Do not wear any make-up or nail polish on your fingers or toes      For your safety, please do not wear any jewelry or body piercing's on the day of surgery. All jewelry must be removed. If you have dentures, they will be removed before going to operating room. For your convenience, we will provide you with a container.     If you wear contact lenses or glasses, they will be removed, please bring a case for them.     If you have a living will and a durable power of  for healthcare, please bring in a copy. As part of our patient safety program to minimize surgical site infections, we ask you to do the following:    · Please notify your surgeon if you develop any illness between         now and the  day of your surgery. · This includes a cough, cold, fever, sore throat, nausea,         or vomiting, and diarrhea, etc.  ·  Please notify your surgeon if you experience dizziness, shortness         of breath or blurred vision between now and the time of your surgery. Do not shave your operative site 96 hours prior to surgery. For face and neck surgery, men may use an electric razor 48 hours   prior to surgery. You may shower the night before surgery or the morning of   your surgery with an antibacterial soap. You will need to bring a photo ID and insurance card    [facility] has an onsite pharmacy, would you like to utilize our pharmacy     If you will be staying overnight and use a C-pap machine, please bring   your C-pap to hospital     Our goal is to provide you with excellent care, therefore, visitors will be limited to two(2) in the room at a time so that we may focus on providing this care for you. Please contact pre-admission testing if you have any further questions. [facility] phone number:  [phone] PAT fax number:  [phone]  Please note these are generalized instructions for all surgical cases, you may be provided with more specific instructions according to your surgery. SAFETY FIRST. .call before you fall    Unfortunately due the rise in covid cases, staffing crisis and hospital capacity, your procedure may need to be rescheduled--if this were to happen your Surgeons office will notify you ASAP

## 2022-01-24 NOTE — PROGRESS NOTES
Subjective:      Patient ID: Claudia Ahumada is a 40 y.o. male. HPI  PREOPERATIVE PHYSICAL:    Patient was sent by Dr. Giorgi Camara for preoperative clearance to have Left Shoulder Arthroscopy on 2-2-22 at [facility].      No Known Allergies  Current Outpatient Medications   Medication Sig Dispense Refill    sildenafil (REVATIO) 20 MG tablet Take 1-2 tablets by mouth daily as needed (erectile dysfunction) 20 tablet 5    tamsulosin (FLOMAX) 0.4 MG capsule Take 1 capsule by mouth daily 30 capsule 5    testosterone cypionate (DEPOTESTOTERONE CYPIONATE) 200 MG/ML injection 150mg every 2 weeks IM 2 mL 5    valACYclovir (VALTREX) 1 g tablet TAKE 2 TABLETS TWICE DAILY FOR ONE DAY FOR OUTBREAKS. (Patient taking differently: Take 1,000 mg by mouth 2 times daily as needed TAKE 2 TABLETS TWICE DAILY FOR ONE DAY FOR OUTBREAKS. ) 4 tablet 0    ibuprofen (ADVIL;MOTRIN) 200 MG tablet Take 400 mg by mouth as needed for Pain       Probiotic Product (PROBIOTIC DAILY PO) Take 1 tablet by mouth as needed        No current facility-administered medications for this visit. Past Medical History:   Diagnosis Date    Anxiety     BPH with urinary obstruction     Chronic seasonal allergic rhinitis due to pollen     Condyloma acuminata     Erectile dysfunction of organic origin     Herpes dermatitis     Nostril    Irritable bowel syndrome with diarrhea     Major depression single episode, in partial remission ([facility] [address]      no meds    Testosterone deficiency      Past Surgical History:   Procedure Laterality Date    CARPAL TUNNEL RELEASE Right 12/20/2018    RIGHT CARPAL TUNNEL RELEASE [facility] Stein [facility] [address] [facility]  [facility]   [address] Age 6 or 7    HAND SURGERY Right 2010    Thumb ORIF    KNEE ARTHROSCOPY Left 4/28/2021    LEFT KNEE ARTHROSCOPY, MEDIAL MENISCECTOMY performed by Zachary Pleitez MD at [address] [facility] destruction with laser.      MA BMI 26.07 kg/m²    Objective:   Physical Exam  Vitals reviewed. Constitutional:       General: He is not in acute distress. Appearance: He is well-developed. HENT:      Head: Normocephalic. Right Ear: External ear normal.      Left Ear: External ear normal.   Neck:      Thyroid: No thyromegaly. Vascular: No carotid bruit or JVD. Cardiovascular:      Rate and Rhythm: Normal rate and regular rhythm. Heart sounds: Normal heart sounds. No murmur heard. Pulmonary:      Effort: Pulmonary effort is normal.      Breath sounds: Normal breath sounds. No wheezing or rales. Abdominal:      General: Abdomen is flat. Bowel sounds are normal. There is no distension. Palpations: Abdomen is soft. There is no mass. Tenderness: There is no abdominal tenderness. There is no guarding or rebound. Hernia: No hernia is present. Lymphadenopathy:      Cervical: No cervical adenopathy. Neurological:      Mental Status: He is alert and oriented to person, place, and time. Assessment:      Preoperative Physical  Left Rotator Cuff Tear       Plan:      Patient is cleared for surgery. Stop Ibuprofen 1 week prior to surgery.     Flu Shot Declined   I recommended the COVID vaccines   Reminded patient to have a Colonoscopy   RTO as needed         [address] Kira Yost DO

## 2022-01-25 ENCOUNTER — TELEPHONE (OUTPATIENT)
Dept: ORTHOPEDIC SURGERY | Age: 47
End: 2022-01-25

## 2022-01-25 ENCOUNTER — OFFICE VISIT (OUTPATIENT)
Dept: FAMILY MEDICINE CLINIC | Age: 47
End: 2022-01-25
Payer: COMMERCIAL

## 2022-01-25 VITALS
TEMPERATURE: 98.5 F | DIASTOLIC BLOOD PRESSURE: 86 MMHG | HEART RATE: 102 BPM | WEIGHT: 192.2 LBS | OXYGEN SATURATION: 98 % | HEIGHT: 72 IN | SYSTOLIC BLOOD PRESSURE: 136 MMHG | BODY MASS INDEX: 26.03 KG/M2

## 2022-01-25 DIAGNOSIS — Z01.818 PREOPERATIVE GENERAL PHYSICAL EXAMINATION: Primary | ICD-10-CM

## 2022-01-25 DIAGNOSIS — M75.102 NONTRAUMATIC TEAR OF LEFT ROTATOR CUFF, UNSPECIFIED TEAR EXTENT: ICD-10-CM

## 2022-01-25 DIAGNOSIS — Z23 NEED FOR INFLUENZA VACCINATION: ICD-10-CM

## 2022-01-25 PROCEDURE — 99212 OFFICE O/P EST SF 10 MIN: CPT | Performed by: FAMILY MEDICINE

## 2022-01-25 ASSESSMENT — PATIENT HEALTH QUESTIONNAIRE - PHQ9
10. IF YOU CHECKED OFF ANY PROBLEMS, HOW DIFFICULT HAVE THESE PROBLEMS MADE IT FOR YOU TO DO YOUR WORK, TAKE CARE OF THINGS AT HOME, OR GET ALONG WITH OTHER PEOPLE: 0
6. FEELING BAD ABOUT YOURSELF - OR THAT YOU ARE A FAILURE OR HAVE LET YOURSELF OR YOUR FAMILY DOWN: 0
SUM OF ALL RESPONSES TO PHQ QUESTIONS 1-9: 0
2. FEELING DOWN, DEPRESSED OR HOPELESS: 0
8. MOVING OR SPEAKING SO SLOWLY THAT OTHER PEOPLE COULD HAVE NOTICED. OR THE OPPOSITE, BEING SO FIGETY OR RESTLESS THAT YOU HAVE BEEN MOVING AROUND A LOT MORE THAN USUAL: 0
SUM OF ALL RESPONSES TO PHQ9 QUESTIONS 1 & 2: 0
1. LITTLE INTEREST OR PLEASURE IN DOING THINGS: 0
5. POOR APPETITE OR OVEREATING: 0
3. TROUBLE FALLING OR STAYING ASLEEP: 0
SUM OF ALL RESPONSES TO PHQ QUESTIONS 1-9: 0
4. FEELING TIRED OR HAVING LITTLE ENERGY: 0
SUM OF ALL RESPONSES TO PHQ QUESTIONS 1-9: 0
7. TROUBLE CONCENTRATING ON THINGS, SUCH AS READING THE NEWSPAPER OR WATCHING TELEVISION: 0
SUM OF ALL RESPONSES TO PHQ QUESTIONS 1-9: 0
9. THOUGHTS THAT YOU WOULD BE BETTER OFF DEAD, OR OF HURTING YOURSELF: 0

## 2022-01-25 ASSESSMENT — ENCOUNTER SYMPTOMS
WHEEZING: 0
ABDOMINAL PAIN: 0
NAUSEA: 0
COUGH: 0
VOMITING: 0
SORE THROAT: 0
CONSTIPATION: 0
SHORTNESS OF BREATH: 0
DIARRHEA: 0
RHINORRHEA: 0
BLOOD IN STOOL: 0

## 2022-01-25 NOTE — TELEPHONE ENCOUNTER
CPT: [id number], [id number]  BODY PART: left shoulder  STATUS: outpatient  AUTHORIZATION: ALEXIS Baca with [address], [address] call ref# [id number]    1/25/22

## 2022-01-31 ENCOUNTER — NURSE ONLY (OUTPATIENT)
Dept: ENDOCRINOLOGY | Age: 47
End: 2022-01-31
Payer: COMMERCIAL

## 2022-01-31 DIAGNOSIS — E34.9 TESTOSTERONE DEFICIENCY: Primary | ICD-10-CM

## 2022-01-31 PROCEDURE — 96372 THER/PROPH/DIAG INJ SC/IM: CPT | Performed by: INTERNAL MEDICINE

## 2022-01-31 RX ORDER — TESTOSTERONE CYPIONATE 100 MG/ML
100 INJECTION, SOLUTION INTRAMUSCULAR ONCE
Status: COMPLETED | OUTPATIENT
Start: 2022-01-31 | End: 2022-01-31

## 2022-01-31 RX ADMIN — TESTOSTERONE CYPIONATE 100 MG: 100 INJECTION, SOLUTION INTRAMUSCULAR at 11:18

## 2022-02-01 ENCOUNTER — ANESTHESIA EVENT (OUTPATIENT)
Dept: OPERATING ROOM | Age: 47
End: 2022-02-01
Payer: COMMERCIAL

## 2022-02-01 DIAGNOSIS — M25.512 ACUTE PAIN OF LEFT SHOULDER: Primary | ICD-10-CM

## 2022-02-01 DIAGNOSIS — S43.432A SUPERIOR LABRUM ANTERIOR-TO-POSTERIOR (SLAP) TEAR OF LEFT SHOULDER: ICD-10-CM

## 2022-02-01 DIAGNOSIS — M75.22 BICEPS TENDINITIS ON LEFT: ICD-10-CM

## 2022-02-01 RX ORDER — DOCUSATE SODIUM 100 MG/1
100 CAPSULE, LIQUID FILLED ORAL 2 TIMES DAILY
Qty: 60 CAPSULE | Refills: 0 | Status: SHIPPED | OUTPATIENT
Start: 2022-02-02 | End: 2022-02-10

## 2022-02-01 RX ORDER — OXYCODONE HYDROCHLORIDE AND ACETAMINOPHEN 5; 325 MG/1; MG/1
1 TABLET ORAL EVERY 6 HOURS PRN
Qty: 20 TABLET | Refills: 0 | Status: SHIPPED | OUTPATIENT
Start: 2022-02-02 | End: 2022-02-07

## 2022-02-01 RX ORDER — PROMETHAZINE HYDROCHLORIDE 25 MG/1
25 TABLET ORAL EVERY 6 HOURS PRN
Qty: 30 TABLET | Refills: 0 | Status: SHIPPED | OUTPATIENT
Start: 2022-02-02 | End: 2022-02-08

## 2022-02-02 ENCOUNTER — HOSPITAL ENCOUNTER (OUTPATIENT)
Age: 47
Setting detail: OUTPATIENT SURGERY
Discharge: HOME | End: 2022-02-02
Attending: ORTHOPAEDIC SURGERY | Admitting: ORTHOPAEDIC SURGERY
Payer: COMMERCIAL

## 2022-02-02 ENCOUNTER — ANESTHESIA (OUTPATIENT)
Dept: OPERATING ROOM | Age: 47
End: 2022-02-02
Payer: COMMERCIAL

## 2022-02-02 VITALS
OXYGEN SATURATION: 100 % | SYSTOLIC BLOOD PRESSURE: 111 MMHG | DIASTOLIC BLOOD PRESSURE: 63 MMHG | TEMPERATURE: 98.6 F | RESPIRATION RATE: 15 BRPM

## 2022-02-02 VITALS
HEIGHT: 72 IN | RESPIRATION RATE: 12 BRPM | DIASTOLIC BLOOD PRESSURE: 80 MMHG | BODY MASS INDEX: 25.02 KG/M2 | TEMPERATURE: 97 F | HEART RATE: 72 BPM | OXYGEN SATURATION: 98 % | SYSTOLIC BLOOD PRESSURE: 130 MMHG | WEIGHT: 184.75 LBS

## 2022-02-02 PROCEDURE — 2580000003 HC RX 258: Performed by: ORTHOPAEDIC SURGERY

## 2022-02-02 PROCEDURE — 3600000004 HC SURGERY LEVEL 4 BASE: Performed by: ORTHOPAEDIC SURGERY

## 2022-02-02 PROCEDURE — 7100000011 HC PHASE II RECOVERY - ADDTL 15 MIN: Performed by: ORTHOPAEDIC SURGERY

## 2022-02-02 PROCEDURE — 6360000002 HC RX W HCPCS: Performed by: NURSE ANESTHETIST, CERTIFIED REGISTERED

## 2022-02-02 PROCEDURE — 2500000003 HC RX 250 WO HCPCS: Performed by: ORTHOPAEDIC SURGERY

## 2022-02-02 PROCEDURE — 3600000014 HC SURGERY LEVEL 4 ADDTL 15MIN: Performed by: ORTHOPAEDIC SURGERY

## 2022-02-02 PROCEDURE — 6360000002 HC RX W HCPCS: Performed by: ORTHOPAEDIC SURGERY

## 2022-02-02 PROCEDURE — 7100000000 HC PACU RECOVERY - FIRST 15 MIN: Performed by: ORTHOPAEDIC SURGERY

## 2022-02-02 PROCEDURE — 2580000003 HC RX 258: Performed by: NURSE ANESTHETIST, CERTIFIED REGISTERED

## 2022-02-02 PROCEDURE — 2709999900 HC NON-CHARGEABLE SUPPLY: Performed by: ORTHOPAEDIC SURGERY

## 2022-02-02 PROCEDURE — 2720000010 HC SURG SUPPLY STERILE: Performed by: ORTHOPAEDIC SURGERY

## 2022-02-02 PROCEDURE — 6360000002 HC RX W HCPCS: Performed by: ANESTHESIOLOGY

## 2022-02-02 PROCEDURE — 76942 ECHO GUIDE FOR BIOPSY: CPT | Performed by: ANESTHESIOLOGY

## 2022-02-02 PROCEDURE — C1776 JOINT DEVICE (IMPLANTABLE): HCPCS | Performed by: ORTHOPAEDIC SURGERY

## 2022-02-02 PROCEDURE — 2500000003 HC RX 250 WO HCPCS: Performed by: NURSE ANESTHETIST, CERTIFIED REGISTERED

## 2022-02-02 PROCEDURE — 3700000000 HC ANESTHESIA ATTENDED CARE: Performed by: ORTHOPAEDIC SURGERY

## 2022-02-02 PROCEDURE — 2580000003 HC RX 258: Performed by: STUDENT IN AN ORGANIZED HEALTH CARE EDUCATION/TRAINING PROGRAM

## 2022-02-02 PROCEDURE — 7100000001 HC PACU RECOVERY - ADDTL 15 MIN: Performed by: ORTHOPAEDIC SURGERY

## 2022-02-02 PROCEDURE — 7100000010 HC PHASE II RECOVERY - FIRST 15 MIN: Performed by: ORTHOPAEDIC SURGERY

## 2022-02-02 PROCEDURE — 3700000001 HC ADD 15 MINUTES (ANESTHESIA): Performed by: ORTHOPAEDIC SURGERY

## 2022-02-02 DEVICE — KIT IMPL SYS PROX TENODESIS W/ BICEPSBUTTON INSRT FIBERLOOP: Type: IMPLANTABLE DEVICE | Site: SHOULDER | Status: FUNCTIONAL

## 2022-02-02 RX ORDER — SODIUM CHLORIDE 9 MG/ML
INJECTION, SOLUTION INTRAVENOUS CONTINUOUS PRN
Status: DISCONTINUED | OUTPATIENT
Start: 2022-02-02 | End: 2022-02-02 | Stop reason: SDUPTHER

## 2022-02-02 RX ORDER — ONDANSETRON 2 MG/ML
INJECTION INTRAMUSCULAR; INTRAVENOUS PRN
Status: DISCONTINUED | OUTPATIENT
Start: 2022-02-02 | End: 2022-02-02 | Stop reason: SDUPTHER

## 2022-02-02 RX ORDER — ROPIVACAINE HYDROCHLORIDE 5 MG/ML
INJECTION, SOLUTION EPIDURAL; INFILTRATION; PERINEURAL
Status: DISCONTINUED | OUTPATIENT
Start: 2022-02-02 | End: 2022-02-02 | Stop reason: SDUPTHER

## 2022-02-02 RX ORDER — GLYCOPYRROLATE 0.2 MG/ML
INJECTION INTRAMUSCULAR; INTRAVENOUS PRN
Status: DISCONTINUED | OUTPATIENT
Start: 2022-02-02 | End: 2022-02-02 | Stop reason: SDUPTHER

## 2022-02-02 RX ORDER — LIDOCAINE HYDROCHLORIDE 20 MG/ML
INJECTION, SOLUTION EPIDURAL; INFILTRATION; INTRACAUDAL; PERINEURAL PRN
Status: DISCONTINUED | OUTPATIENT
Start: 2022-02-02 | End: 2022-02-02 | Stop reason: SDUPTHER

## 2022-02-02 RX ORDER — SODIUM CHLORIDE 9 MG/ML
25 INJECTION, SOLUTION INTRAVENOUS PRN
Status: DISCONTINUED | OUTPATIENT
Start: 2022-02-02 | End: 2022-02-02 | Stop reason: HOSPADM

## 2022-02-02 RX ORDER — BUPIVACAINE HYDROCHLORIDE AND EPINEPHRINE 2.5; 5 MG/ML; UG/ML
INJECTION, SOLUTION EPIDURAL; INFILTRATION; INTRACAUDAL; PERINEURAL
Status: COMPLETED | OUTPATIENT
Start: 2022-02-02 | End: 2022-02-02

## 2022-02-02 RX ORDER — SODIUM CHLORIDE 0.9 % (FLUSH) 0.9 %
5-40 SYRINGE (ML) INJECTION EVERY 12 HOURS SCHEDULED
Status: DISCONTINUED | OUTPATIENT
Start: 2022-02-02 | End: 2022-02-02 | Stop reason: HOSPADM

## 2022-02-02 RX ORDER — MIDAZOLAM HYDROCHLORIDE 1 MG/ML
INJECTION INTRAMUSCULAR; INTRAVENOUS PRN
Status: DISCONTINUED | OUTPATIENT
Start: 2022-02-02 | End: 2022-02-02 | Stop reason: SDUPTHER

## 2022-02-02 RX ORDER — SODIUM CHLORIDE, SODIUM LACTATE, POTASSIUM CHLORIDE, CALCIUM CHLORIDE 600; 310; 30; 20 MG/100ML; MG/100ML; MG/100ML; MG/100ML
INJECTION, SOLUTION INTRAVENOUS CONTINUOUS PRN
Status: COMPLETED | OUTPATIENT
Start: 2022-02-02 | End: 2022-02-02

## 2022-02-02 RX ORDER — SUCCINYLCHOLINE/SOD CL,ISO/PF 200MG/10ML
SYRINGE (ML) INTRAVENOUS PRN
Status: DISCONTINUED | OUTPATIENT
Start: 2022-02-02 | End: 2022-02-02 | Stop reason: SDUPTHER

## 2022-02-02 RX ORDER — LABETALOL HYDROCHLORIDE 5 MG/ML
5 INJECTION, SOLUTION INTRAVENOUS EVERY 10 MIN PRN
Status: DISCONTINUED | OUTPATIENT
Start: 2022-02-02 | End: 2022-02-02 | Stop reason: HOSPADM

## 2022-02-02 RX ORDER — FENTANYL CITRATE 50 UG/ML
25 INJECTION, SOLUTION INTRAMUSCULAR; INTRAVENOUS EVERY 5 MIN PRN
Status: DISCONTINUED | OUTPATIENT
Start: 2022-02-02 | End: 2022-02-02 | Stop reason: HOSPADM

## 2022-02-02 RX ORDER — PROMETHAZINE HYDROCHLORIDE 25 MG/ML
6.25 INJECTION, SOLUTION INTRAMUSCULAR; INTRAVENOUS
Status: DISCONTINUED | OUTPATIENT
Start: 2022-02-02 | End: 2022-02-02 | Stop reason: HOSPADM

## 2022-02-02 RX ORDER — SODIUM CHLORIDE 9 MG/ML
INJECTION, SOLUTION INTRAVENOUS CONTINUOUS
Status: DISCONTINUED | OUTPATIENT
Start: 2022-02-02 | End: 2022-02-02 | Stop reason: HOSPADM

## 2022-02-02 RX ORDER — SODIUM CHLORIDE 0.9 % (FLUSH) 0.9 %
5-40 SYRINGE (ML) INJECTION PRN
Status: DISCONTINUED | OUTPATIENT
Start: 2022-02-02 | End: 2022-02-02 | Stop reason: HOSPADM

## 2022-02-02 RX ORDER — PROPOFOL 10 MG/ML
INJECTION, EMULSION INTRAVENOUS PRN
Status: DISCONTINUED | OUTPATIENT
Start: 2022-02-02 | End: 2022-02-02 | Stop reason: SDUPTHER

## 2022-02-02 RX ORDER — FENTANYL CITRATE 50 UG/ML
INJECTION, SOLUTION INTRAMUSCULAR; INTRAVENOUS PRN
Status: DISCONTINUED | OUTPATIENT
Start: 2022-02-02 | End: 2022-02-02 | Stop reason: SDUPTHER

## 2022-02-02 RX ORDER — DEXAMETHASONE SODIUM PHOSPHATE 4 MG/ML
INJECTION, SOLUTION INTRA-ARTICULAR; INTRALESIONAL; INTRAMUSCULAR; INTRAVENOUS; SOFT TISSUE PRN
Status: DISCONTINUED | OUTPATIENT
Start: 2022-02-02 | End: 2022-02-02 | Stop reason: SDUPTHER

## 2022-02-02 RX ADMIN — ONDANSETRON 4 MG: 2 INJECTION INTRAMUSCULAR; INTRAVENOUS at 08:09

## 2022-02-02 RX ADMIN — Medication 160 MG: at 07:24

## 2022-02-02 RX ADMIN — SODIUM CHLORIDE: 9 INJECTION, SOLUTION INTRAVENOUS at 06:45

## 2022-02-02 RX ADMIN — FENTANYL CITRATE 50 MCG: 50 INJECTION INTRAMUSCULAR; INTRAVENOUS at 07:10

## 2022-02-02 RX ADMIN — ROPIVACAINE HYDROCHLORIDE 30 ML: 5 INJECTION, SOLUTION EPIDURAL; INFILTRATION; PERINEURAL at 08:36

## 2022-02-02 RX ADMIN — CEFAZOLIN 2 G: 10 INJECTION, POWDER, FOR SOLUTION INTRAVENOUS at 07:18

## 2022-02-02 RX ADMIN — GLYCOPYRROLATE 0.2 MG: 0.2 INJECTION, SOLUTION INTRAMUSCULAR; INTRAVENOUS at 07:18

## 2022-02-02 RX ADMIN — SODIUM CHLORIDE: 9 INJECTION, SOLUTION INTRAVENOUS at 08:12

## 2022-02-02 RX ADMIN — FENTANYL CITRATE 50 MCG: 50 INJECTION INTRAMUSCULAR; INTRAVENOUS at 07:24

## 2022-02-02 RX ADMIN — LIDOCAINE HYDROCHLORIDE 100 MG: 20 INJECTION, SOLUTION EPIDURAL; INFILTRATION; INTRACAUDAL; PERINEURAL at 07:24

## 2022-02-02 RX ADMIN — MIDAZOLAM 2 MG: 1 INJECTION INTRAMUSCULAR; INTRAVENOUS at 07:10

## 2022-02-02 RX ADMIN — PROPOFOL 200 MG: 10 INJECTION, EMULSION INTRAVENOUS at 07:24

## 2022-02-02 RX ADMIN — SODIUM CHLORIDE: 9 INJECTION, SOLUTION INTRAVENOUS at 07:10

## 2022-02-02 RX ADMIN — DEXAMETHASONE SODIUM PHOSPHATE 10 MG: 4 INJECTION, SOLUTION INTRA-ARTICULAR; INTRALESIONAL; INTRAMUSCULAR; INTRAVENOUS; SOFT TISSUE at 07:30

## 2022-02-02 ASSESSMENT — PULMONARY FUNCTION TESTS
PIF_VALUE: 17
PIF_VALUE: 3
PIF_VALUE: 12
PIF_VALUE: 17
PIF_VALUE: 2
PIF_VALUE: 17
PIF_VALUE: 2
PIF_VALUE: 2
PIF_VALUE: 17
PIF_VALUE: 16
PIF_VALUE: 17
PIF_VALUE: 11
PIF_VALUE: 14
PIF_VALUE: 15
PIF_VALUE: 17
PIF_VALUE: 4
PIF_VALUE: 18
PIF_VALUE: 12
PIF_VALUE: 17
PIF_VALUE: 16
PIF_VALUE: 17
PIF_VALUE: 17
PIF_VALUE: 3
PIF_VALUE: 17
PIF_VALUE: 16
PIF_VALUE: 17
PIF_VALUE: 10
PIF_VALUE: 16
PIF_VALUE: 12
PIF_VALUE: 4
PIF_VALUE: 17
PIF_VALUE: 2
PIF_VALUE: 4
PIF_VALUE: 17
PIF_VALUE: 16
PIF_VALUE: 17
PIF_VALUE: 17
PIF_VALUE: 18
PIF_VALUE: 17
PIF_VALUE: 17
PIF_VALUE: 1
PIF_VALUE: 14
PIF_VALUE: 17
PIF_VALUE: 2
PIF_VALUE: 16
PIF_VALUE: 12
PIF_VALUE: 17
PIF_VALUE: 3
PIF_VALUE: 17
PIF_VALUE: 17
PIF_VALUE: 11
PIF_VALUE: 17
PIF_VALUE: 4
PIF_VALUE: 17
PIF_VALUE: 4
PIF_VALUE: 17
PIF_VALUE: 16
PIF_VALUE: 15
PIF_VALUE: 17
PIF_VALUE: 1
PIF_VALUE: 16
PIF_VALUE: 0
PIF_VALUE: 2
PIF_VALUE: 2
PIF_VALUE: 17
PIF_VALUE: 17
PIF_VALUE: 16

## 2022-02-02 ASSESSMENT — PAIN - FUNCTIONAL ASSESSMENT: PAIN_FUNCTIONAL_ASSESSMENT: 0-10

## 2022-02-02 ASSESSMENT — PAIN SCALES - GENERAL
PAINLEVEL_OUTOF10: 0

## 2022-02-02 NOTE — PROGRESS NOTES
CLINICAL PHARMACY NOTE: MEDS TO BEDS     Total # of Prescriptions Filled: 3   The following medications were delivered to the patient:  Current Discharge Medication List      · Oxycodone-APAP 5-325 Tabs - Take one tablet by mouth every 6 hours as needed for pain for up to 5 days  · Promethazine 25 mg tabs - Take one tablet by mouth every 6 hours as needed for nausea   · Docusate sodium 100 mg Caps - Take one capsule by mouth two times daily     Additional Documentation:

## 2022-02-02 NOTE — DISCHARGE INSTR - DIET
Good nutrition is important when healing from an illness, injury, or surgery. Follow any nutrition recommendations given to you during your hospital stay. If you were given an oral nutrition supplement while in the hospital, continue to take this supplement at home. You can take it with meals, in-between meals, and/or before bedtime. These supplements can be purchased at most local grocery stores, pharmacies, and chain [email or web address]. If you have any questions about your diet or nutrition, call the hospital and ask for the dietitian. Advance to regular diet as tolerated.

## 2022-02-02 NOTE — ANESTHESIA PRE PROCEDURE
[facility] Department of Anesthesiology  Pre-Anesthesia Evaluation/Consultation       Name:  Osman Bruno  :   Age:  40 y.o. MRN:  [id number]  Date: 2022           Surgeon: Surgeon(s):  Angelica Thompson MD    Procedure: Procedure(s):  LEFT SHOULDER ARTHROSCOPY  DEBRIDEMENT BICEP TENODESIS     No Known Allergies  Patient Active Problem List   Diagnosis    Condyloma acuminata    DDD (degenerative disc disease), lumbar    Anxiety    Major depression single episode, in partial remission ([address]    Irritable bowel syndrome with diarrhea    Testosterone deficiency    Herpes dermatitis    Erectile dysfunction of organic origin    Chronic seasonal allergic rhinitis due to pollen    Prolonged emergence from general anesthesia    BPH with urinary obstruction     Past Medical History:   Diagnosis Date    Anxiety     BPH with urinary obstruction     Chronic seasonal allergic rhinitis due to pollen     Condyloma acuminata     Erectile dysfunction of organic origin     Herpes dermatitis     Nostril    Irritable bowel syndrome with diarrhea     Major depression single episode, in partial remission ([address]      no meds    Testosterone deficiency      Past Surgical History:   Procedure Laterality Date    CARPAL TUNNEL RELEASE Right 2018    RIGHT CARPAL TUNNEL RELEASE performed by Ariana Gutierrez MD at [address]  2008   [address] Left Age 6 or 10    HAND SURGERY Right     Thumb ORIF    KNEE ARTHROSCOPY Left 2021    LEFT KNEE ARTHROSCOPY, MEDIAL MENISCECTOMY performed by Angelica Thompson MD at [facility] with laser.      AL REPAIR MAJOR PERIPHERAL NERVE Right 2018    RIGHT ULNAR NERVE DECOMPRESSION AT ELBOW performed by Ariana Gutierrez MD at [facility] [address] Right 2000    WRIST SURGERY Right     cyst removed    WRIST SURGERY Left  Cyst removal     Social History     Tobacco Use    Smoking status: Never Smoker    Smokeless tobacco: Never Used   Vaping Use    Vaping Use: Never used   Substance Use Topics    Alcohol use: Yes     Comment: Occasional    Drug use: Never     Comment: LAST [id number] OCCAS     Medications  No current facility-administered medications on file prior to encounter. Current Outpatient Medications on File Prior to Encounter   Medication Sig Dispense Refill    sildenafil (REVATIO) 20 MG tablet Take 1-2 tablets by mouth daily as needed (erectile dysfunction) 20 tablet 5    tamsulosin (FLOMAX) 0.4 MG capsule Take 1 capsule by mouth daily 30 capsule 5    valACYclovir (VALTREX) 1 g tablet TAKE 2 TABLETS TWICE DAILY FOR ONE DAY FOR OUTBREAKS. (Patient taking differently: Take 1,000 mg by mouth 2 times daily as needed TAKE 2 TABLETS TWICE DAILY FOR ONE DAY FOR OUTBREAKS. ) 4 tablet 0    Probiotic Product (PROBIOTIC DAILY PO) Take 1 tablet by mouth as needed       testosterone cypionate (DEPOTESTOTERONE CYPIONATE) 200 MG/ML injection 150mg every 2 weeks IM 2 mL 5    ibuprofen (ADVIL;MOTRIN) 200 MG tablet Take 400 mg by mouth as needed for Pain        Current Facility-Administered Medications   Medication Dose Route Frequency Provider Last Rate Last Admin    ceFAZolin (ANCEF) 2000 mg in dextrose 5 % 100 mL IVPB  2,000 mg IntraVENous Once Minal Coy MD        0.9 % sodium chloride infusion   IntraVENous Continuous Hermelinda Barboza MD        sodium chloride flush 0.9 % injection 5-40 mL  5-40 mL IntraVENous 2 times per day Hermelinda Barboza MD        sodium chloride flush 0.9 % injection 5-40 mL  5-40 mL IntraVENous PRN Hermelinda Barboza MD        0.9 % sodium chloride infusion  25 mL IntraVENous PRN Hermelinda Barboza MD         Vital Signs (Current)   Vitals:    02/02/22 0634   BP: (!) 131/90   Pulse: 73   Resp: 14   Temp: 97.2 °F (36.2 °C)   SpO2: 98%     Vital Signs Statistics (for past 48 hrs) Temp  Av.2 °F (36.2 °C)  Min: 97.2 °F (36.2 °C)   Min taken time: 22  Max: 97.2 °F (36.2 °C)   Max taken time: 22  Pulse  Av  Min: 73   Min taken time: 22  Max: 68   Max taken time: 22  Resp  Av  Min: 15   Min taken time: 22  Max: 14   Max taken time: 22  BP  Min: 131/90   Min taken time: 22  Max: 131/90   Max taken time: 22  SpO2  Av %  Min: 98 %   Min taken time: 22  Max: 98 %   Max taken time: 22    BP Readings from Last 3 Encounters:   22 (!) 131/90   22 136/86   21 136/88     BMI  Body mass index is 25.06 kg/m². Estimated body mass index is 25.06 kg/m² as calculated from the following:    Height as of this encounter: 6' (1.829 m). Weight as of this encounter: 184 lb 11.9 oz (83.8 kg). CBC   Lab Results   Component Value Date    WBC 5.4 2020    RBC 4.52 2020    HGB 15.1 2020    HCT 44.6 2020    MCV 98.5 2020    RDW 13.0 2020     2020     CMP    Lab Results   Component Value Date     2017    K 4.7 2017     2017    CO2 28 2017    BUN 23 2017    CREATININE 0.9 2017    GFRAA >60 2017    AGRATIO 1.7 2017    LABGLOM >60 2017    GLUCOSE 76 2017    PROT 6.9 2017    CALCIUM 9.6 2017    BILITOT 0.8 2017    ALKPHOS 61 2017    AST 22 2017    ALT 28 2017     BMP    Lab Results   Component Value Date     2017    K 4.7 2017     2017    CO2 28 2017    BUN 23 2017    CREATININE 0.9 2017    CALCIUM 9.6 2017    GFRAA >60 2017    LABGLOM >60 2017    GLUCOSE 76 2017     POCGlucose  No results for input(s): GLUCOSE in the last 72 hours.    Coags  No results found for: PROTIME, INR, APTT  HCG (If Applicable) No results found for: PREGTESTUR, PREGSERUM, HCG, HCGQUANT   ABGs No results found for: PHART, PO2ART, FCF5AKW, WIH8JGP, BEART, P9HIVUYI   Type & Screen (If Applicable)  No results found for: LABABO, LABRH                         BMI: Wt Readings from Last 3 Encounters:       NPO Status: 8h ours                          Anesthesia Evaluation  Patient summary reviewed no history of anesthetic complications:   Airway: Mallampati: III  TM distance: >3 FB   Neck ROM: full   Dental: normal exam         Pulmonary:Negative Pulmonary ROS and normal exam                               Cardiovascular:Negative CV ROS  Exercise tolerance: good (>4 METS),           Rhythm: regular  Rate: normal           Beta Blocker:  Not on Beta Blocker         Neuro/Psych:   (+) psychiatric history:depression/anxiety             GI/Hepatic/Renal: Neg GI/Hepatic/Renal ROS       (-) GERD       Endo/Other: Negative Endo/Other ROS                    Abdominal:             Vascular: negative vascular ROS. Other Findings:             Anesthesia Plan      general and regional     ASA 2       Induction: intravenous. MIPS: Postoperative opioids intended and Prophylactic antiemetics administered. Anesthetic plan and risks discussed with patient and father. Plan discussed with CRNA. This pre-anesthesia assessment may be used as a history and physical.    DOS STAFF ADDENDUM:    Pt seen and examined, chart reviewed (including anesthesia, drug and allergy history). No interval changes to history and physical examination. Anesthetic plan, risks, benefits, alternatives, and personnel involved discussed with patient. Questions and concerns addressed. Patient(family) verbalized an understanding and agrees to proceed.       Shannon Garza MD  February 2, 2022  6:40 AM

## 2022-02-02 NOTE — ANESTHESIA PROCEDURE NOTES
Peripheral Block    Patient location during procedure: pre-op  Start time: 2/2/2022 6:58 AM  End time: 2/2/2022 7:01 AM  Staffing  Performed: anesthesiologist   Anesthesiologist: Cassandra Cowart MD  Preanesthetic Checklist  Completed: patient identified, IV checked, site marked, risks and benefits discussed, surgical consent, monitors and equipment checked, pre-op evaluation, timeout performed, anesthesia consent given, oxygen available and patient being monitored  Peripheral Block  Patient position: sitting  Prep: DuraPrep  Patient monitoring: continuous pulse ox and IV access  Block type: Brachial plexus  Laterality: left  Injection technique: single-shot  Guidance: nerve stimulator and ultrasound guided  Interscalene  Provider prep: mask and sterile gloves  Needle  Needle type: combined needle/nerve stimulator   Needle gauge: 20 G  Needle length: 5 cm  Needle localization: nerve stimulator and ultrasound guidance  Needle insertion depth: 2 cm  Assessment  Injection assessment: negative aspiration for heme, no paresthesia on injection and local visualized surrounding nerve on ultrasound  Paresthesia pain: none  Slow fractionated injection: yes  Hemodynamics: stable  Medications Administered  Ropivacaine (NAROPIN) injection 0.5%, 30 mL  Reason for block: post-op pain management and at surgeon's request

## 2022-02-02 NOTE — H&P
Danisha Sorto was seen and examined. No interval changes. Left shoulder marked. Risk, benefits, and alternatives to surgery have been discussed at length patient is ready to proceed with left shoulder arthroscopy with biceps tenodesis.

## 2022-02-02 NOTE — ANESTHESIA POSTPROCEDURE EVALUATION
[facility] Department of Anesthesiology  Post-Anesthesia Note       Name:  Aileen Espinosa                                  Age:  40 y.o. MRN:  [id number]     Last Vitals & Oxygen Saturation: /80   Pulse 72   Temp 97 °F (36.1 °C) (Temporal)   Resp 12   Ht 6' (1.829 m)   Wt 184 lb 11.9 oz (83.8 kg)   SpO2 98%   BMI 25.06 kg/m²   Patient Vitals for the past 4 hrs:   BP Temp Temp src Pulse Resp SpO2 Height Weight   02/02/22 0928 130/80 -- -- 72 12 98 % -- --   02/02/22 0905 133/82 97 °F (36.1 °C) Temporal 68 14 99 % -- --   02/02/22 0859 -- 96.6 °F (35.9 °C) Temporal -- -- -- -- --   02/02/22 0852 (!) 143/84 -- -- 71 17 100 % -- --   02/02/22 0842 (!) 147/85 -- -- 79 17 100 % -- --   02/02/22 0837 (!) 152/90 -- -- 95 11 100 % -- --   02/02/22 0835 (!) 150/95 -- -- 96 12 100 % -- --   02/02/22 0834 -- 96.3 °F (35.7 °C) Temporal -- -- 100 % -- --   02/02/22 0634 (!) 131/90 97.2 °F (36.2 °C) Temporal 73 14 98 % 6' (1.829 m) 184 lb 11.9 oz (83.8 kg)       Level of consciousness:  Awake, alert    Respiratory: Respirations easy, no distress. Stable. Cardiovascular: Hemodynamically stable. Hydration: Adequate. PONV: Adequately managed. Post-op pain: Adequately controlled. Post-op assessment: Tolerated anesthetic well without complication. Complications:  None. Nerve block intact. Moving fingers. No issues.     Lori Taylor MD  February 2, 2022   10:09 AM
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
## 2022-02-08 ENCOUNTER — OFFICE VISIT (OUTPATIENT)
Dept: ORTHOPEDIC SURGERY | Age: 47
End: 2022-02-08

## 2022-02-08 ENCOUNTER — HOSPITAL ENCOUNTER (OUTPATIENT)
Dept: PHYSICAL THERAPY | Age: 47
Setting detail: THERAPIES SERIES
Discharge: HOME | End: 2022-02-08
Payer: COMMERCIAL

## 2022-02-08 VITALS — HEIGHT: 72 IN | BODY MASS INDEX: 25.6 KG/M2 | RESPIRATION RATE: 12 BRPM | WEIGHT: 189 LBS

## 2022-02-08 DIAGNOSIS — M25.512 ACUTE PAIN OF LEFT SHOULDER: Primary | ICD-10-CM

## 2022-02-08 DIAGNOSIS — M75.22 BICEPS TENDINITIS ON LEFT: ICD-10-CM

## 2022-02-08 DIAGNOSIS — S43.432A SUPERIOR LABRUM ANTERIOR-TO-POSTERIOR (SLAP) TEAR OF LEFT SHOULDER: ICD-10-CM

## 2022-02-08 PROCEDURE — 97161 PT EVAL LOW COMPLEX 20 MIN: CPT | Performed by: PHYSICAL THERAPIST

## 2022-02-08 PROCEDURE — 97110 THERAPEUTIC EXERCISES: CPT | Performed by: PHYSICAL THERAPIST

## 2022-02-08 PROCEDURE — 97530 THERAPEUTIC ACTIVITIES: CPT | Performed by: PHYSICAL THERAPIST

## 2022-02-08 PROCEDURE — 99024 POSTOP FOLLOW-UP VISIT: CPT | Performed by: ORTHOPAEDIC SURGERY

## 2022-02-08 NOTE — PROGRESS NOTES
Yasmin Carpenter returns today status post left shoulder arthroscopy with debridement and bicep tenodesis performed 6 days ago. He is doing well. Pain is appropriately managed. Changes bandages in place new Steri-Strips and Tegaderm. Neurologic and vascular exams are normal.  I reviewed his arthroscopic photos.   He will start PT and follow-up with me at the end of the week for suture removal
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
## 2022-02-08 NOTE — FLOWSHEET NOTE
[facility] Performance and Rehabilitation a Department of [address]  [address] [address], [address]  Office: [phone]  Fax:  [phone]        Physical Therapy Treatment Note/ Progress Report:           Date:  2022    Patient Name:  Kennieth Crigler    :  [birth year]  MRN: [id number]  Restrictions/Precautions:    Medical/Treatment Diagnosis Information:  · Diagnosis: s/p left shoulder biceps tenodesis. Surgery on 22;  M25.512- left shoulder pain  · Treatment Diagnosis: M25.512- left shoulder pain  Insurance/Certification information:  PT Insurance Information: [place] [employer]  Physician Information:  Referring Practitioner: [facility]  Has the plan of care been signed (Y/N):        []  Yes  [x]  No     Date of Patient follow up with Physician: today then Friday      Is this a Progress Report:     []  Yes  [x]  No        If Yes:  Date Range for reporting period:  Beginning 2022  Ending    Progress report will be due (10 Rx or 30 days whichever is less): 8 Mar 2022          Visit # Insurance Allowable Auth Required   1 auto []  Yes [x]  No        Functional Scale: UEFI-8.75%   Date assessed: 2022    Pain level:  See eval     SUBJECTIVE:  See eval    OBJECTIVE: See eval   Observation:    Test measurements:      ROM PROM AROM  Comment    L R L R    Flexion        Abduction        ER        IR        Other        Other             Strength L R Comment   Flexion      Abduction      ER      IR      Supraspinatus      Upper Trap      Lower Trap      Mid Trap      Rhomboids      Biceps      Triceps      Horizontal Abduction      Horizontal Adduction      Lats          RESTRICTIONS/PRECAUTIONS: full motion of shoulder. NWB.        Exercises/Interventions:   Exercise/Equipment Resistance/Repetitions Other comments   Aerobic Conditioning     Aerodyne          Stretching/PROM     Wand     Table Slides 10x:10 Flex/scap   Wall slides      UE [place] 10x:10 ER at neutral   Pulleys     Pendulum     BB IR     SL IR     Pec doorway stretch     CBA stretch     UT stretch     LS stretch     Isometrics     Retraction 10x:10     3' orange    Weight shift     Flexion     Abduction     External Rotation     Internal Rotation     Biceps     Triceps          PRE's     Flexion     Abduction     External Rotation     Internal Rotation     Shrugs 3x10    EXT     Reverse Flys     Serratus     Horizontal Abd with ER     Biceps PROM 10x    Triceps     Retraction          Cable Column/Theraband     External Rotation     Internal Rotation     Shrugs     Lats     Ext     Flex     Scapular Retraction     BIC     TRIC     PNF          Dynamic Stability          P[place]oback            Access Code: Mackenzie Triana  URL: [email or web address]  Date: 02/08/2022  Prepared by: Herb [place]    Exercises  Circular Shoulder Pendulum with Table Support - 2 x daily - 7 x weekly - 10 sets - 10 hold  Seated Gripping Towel - 2 x daily - 7 x weekly - 3 reps - 1 sets - 2-3 hold  Seated Elbow Flexion AAROM - 2 x daily - 7 x weekly - 10 sets - 10 hold  Seated Scapular Retraction - 2 x daily - 7 x weekly - 10 reps - 10 hold  Seated Shoulder Shrugs - 2 x daily - 7 x weekly - 10 reps - 3 sets  Seated Shoulder Flexion Towel Slide at Table Top - 2 x daily - 7 x weekly - 10 reps - 10 hold  Seated Shoulder Scaption Slide at Table Top with Forearm in Neutral - 2 x daily - 7 x weekly - 10 reps - 10 hold  Seated Shoulder External Rotation AAROM with Cane and Hand in Neutral - 2 x daily - 7 x weekly - 10 reps - 10 hold    Therapeutic Exercise and NMR EXR  [x] (90660) Provided verbal/tactile cueing for activities related to strengthening, flexibility, endurance, ROM  for improvements in scapular, scapulothoracic and UE control with self care, reaching, carrying, lifting, house/yardwork, driving/computer work.     [x] (83258) Provided verbal/tactile cueing for activities related to improving balance, coordination, kinesthetic sense, posture, motor skill, proprioception  to assist with  scapular, scapulothoracic and UE control with self care, reaching, carrying, lifting, house/yardwork, driving/computer work. Therapeutic Activities:    [x] (69525 or 94119) Provided verbal/tactile cueing for activities related to improving balance, coordination, kinesthetic sense, posture, motor skill, proprioception and motor activation to allow for proper function of scapular, scapulothoracic and UE control with self care, carrying, lifting, driving/computer work. Home Exercise Program:    [x] (94203) Reviewed/Progressed HEP activities related to strengthening, flexibility, endurance, ROM of scapular, scapulothoracic and UE control with self care, reaching, carrying, lifting, house/yardwork, driving/computer work  [x] (87577) Reviewed/Progressed HEP activities related to improving balance, coordination, kinesthetic sense, posture, motor skill, proprioception of scapular, scapulothoracic and UE control with self care, reaching, carrying, lifting, house/yardwork, driving/computer work      Manual Treatments:  PROM / STM / Oscillations-Mobs:  G-I, II, III, IV (PA's, Inf., Post.)  [x] (62755) Provided manual therapy to mobilize soft tissue/joints of cervical/CT, scapular GHJ and UE for the purpose of modulating pain, promoting relaxation,  increasing ROM, reducing/eliminating soft tissue swelling/inflammation/restriction, improving soft tissue extensibility and allowing for proper ROM for normal function with self care, reaching, carrying, lifting, house/yardwork, driving/computer work    Pt Education: Patient education on PT and plan of care including diagnosis, prognosis, treatment goals and options. Patient agrees with discussed POC and treatment and is aware of rehab process. Pt was also educated on clinic layout and use of modalities. PT gave pt business card to call if any questions.   Pt was ed on sling use, wound care, self care, dressing, showering, donning/doffing sling, and protocol. Pt was ed on S&S of infection and what to do if these are experienced. Modalities: 15' ice     Charges:  Timed Code Treatment Minutes: 25'   Total Treatment Minutes: 72'     [x] EVAL (LOW) 455 1011   [] EVAL (MOD) 64442   [] EVAL (HIGH) 24843   [] RE-EVAL   [x] DV(56244) x  1   [] IONTO  [] NMR (21620) x     [] VASO  [] Manual (72995) x      [] Other:  [x] TA x  1    [] Mech Traction (34086)  [] ES(attended) (55070)      [] ES (un) (53866):     GOALS:   Patient stated goal: return to normal activity    []? Progressing: []? Met: []? Not Met: []? Adjusted     Therapist goals for Patient:   Short Term Goals: To be achieved in: 2 weeks  1. Independent in HEP and progression per patient tolerance, in order to prevent re-injury. []? Progressing: []? Met: []? Not Met: []? Adjusted   2. Patient will report pain at worst less than or equal to 4/10. []? Progressing: []? Met: []? Not Met: []? Adjusted     Long Term Goals: To be achieved in: 16 weeks  1. Pt will demo UEFI of 90% or better to assist with reaching prior level of function. []? Progressing: []? Met: []? Not Met: []? Adjusted  2. Patient will demonstrate increased AROM to shoulder flex greater than or equal to 170, ABD greater than or equal to 170, ER greater than or equal to 80, IR greater than or equal to 50 to allow for proper joint functioning as indicated by patients Functional Deficits. []? Progressing: []? Met: []? Not Met: []? Adjusted  3. Patient will demonstrate an increase in strength to shoulder flex greater than or equal to 4+, ABD greater than or equal to 4+, ER greater than or equal to 4, IR greater than or equal to 4+ to allow for proper functional mobility as indicated by patients functional deficits. []? Progressing: []? Met: []? Not Met: []? Adjusted  4. Patient will return to performing all self care without increased symptoms or restriction. []? Progressing: []?  Met: []? Not Met: []? Adjusted  5. Pt will report pain at worst less than or equal to 2/10. []? Progressing: []? Met: []? Not Met: []? Adjusted  6. Pt will return to his normal workout routine. []? Progressing: []? Met: []? Not Met: []? Adjusted           Overall Progression Towards Functional goals/ Treatment Progress Update:  [] Patient is progressing as expected towards functional goals listed. [] Progression is slowed due to complexities/Impairments listed. [] Progression has been slowed due to co-morbidities. [x] Plan just implemented, too soon to assess goals progression <30days   [] Goals require adjustment due to lack of progress  [] Patient is not progressing as expected and requires additional follow up with physician  [] Other    Prognosis for POC: [x] Good [] Fair  [] Poor      Patient requires continued skilled intervention: [x] Yes  [] No    Treatment/Activity Tolerance:  [x] Patient able to complete treatment  [] Patient limited by fatigue  [] Patient limited by pain     [] Patient limited by other medical complications  [] Other: Pt is a 41 y/o male presenting s/p left biceps tenodesis from the MD.  Clinically, the pt presents with decreased ROM, decreased strength, decreased function, and increased pain consistent with the MD diagnosis. The pt would benefit from skilled PT to return to PLOF. Pt is a fit individual who recovered well earlier this year from meniscectomy. He presents today almost 1 week after biceps tenodesis (delayed due to poor weather). Pt presents with good ROM for first visit post op. Pt's claim is under [employer], not his personal insurance. In pt's previous rehab, he was motivated to return to his normal workout routine. I anticipate this will be the same for this surgery. Pt's benefits were reviewed. All questions were answered. Pt is agreeable. PLAN: If pt doesn't return, this note can be considered a D/C note.   See mayito  [] Continue per plan of care [] Colleen Chambers current plan (see comments above)  [x] Plan of care initiated [] Hold pending MD visit [] Discharge    Electronically signed by: Sherlene Mohs, PT PT, DPT [id number]

## 2022-02-09 ENCOUNTER — OFFICE VISIT (OUTPATIENT)
Dept: ORTHOPEDIC SURGERY | Age: 47
End: 2022-02-09

## 2022-02-09 VITALS — HEIGHT: 72 IN | WEIGHT: 189 LBS | BODY MASS INDEX: 25.6 KG/M2

## 2022-02-09 DIAGNOSIS — S62.612A CLOSED DISPLACED FRACTURE OF PROXIMAL PHALANX OF RIGHT MIDDLE FINGER, INITIAL ENCOUNTER: Primary | ICD-10-CM

## 2022-02-09 PROCEDURE — 99024 POSTOP FOLLOW-UP VISIT: CPT | Performed by: NURSE PRACTITIONER

## 2022-02-09 PROCEDURE — APPNB15 APP NON BILLABLE TIME 0-15 MINS: Performed by: NURSE PRACTITIONER

## 2022-02-10 ENCOUNTER — APPOINTMENT (OUTPATIENT)
Dept: CT IMAGING | Age: 47
DRG: 176 | End: 2022-02-10
Payer: COMMERCIAL

## 2022-02-10 ENCOUNTER — HOSPITAL ENCOUNTER (INPATIENT)
Age: 47
LOS: 1 days | Discharge: HOME | DRG: 176 | End: 2022-02-11
Attending: EMERGENCY MEDICINE | Admitting: STUDENT IN AN ORGANIZED HEALTH CARE EDUCATION/TRAINING PROGRAM
Payer: COMMERCIAL

## 2022-02-10 ENCOUNTER — TELEPHONE (OUTPATIENT)
Dept: FAMILY MEDICINE CLINIC | Age: 47
End: 2022-02-10

## 2022-02-10 DIAGNOSIS — R04.2 HEMOPTYSIS: ICD-10-CM

## 2022-02-10 DIAGNOSIS — I26.99 OTHER ACUTE PULMONARY EMBOLISM WITHOUT ACUTE COR PULMONALE (HCC): Primary | ICD-10-CM

## 2022-02-10 DIAGNOSIS — I26.99 RIGHT PULMONARY EMBOLUS (HCC): ICD-10-CM

## 2022-02-10 DIAGNOSIS — I26.99 PULMONARY INFARCT (HCC): ICD-10-CM

## 2022-02-10 LAB
A/G RATIO: 1.1 (ref 1.1–2.2)
ALBUMIN SERPL-MCNC: 4 G/DL (ref 3.4–5)
ALP BLD-CCNC: 93 U/L (ref 40–129)
ALT SERPL-CCNC: 16 U/L (ref 10–40)
ANION GAP SERPL CALCULATED.3IONS-SCNC: 11 MMOL/L (ref 3–16)
APTT: 36.1 SEC (ref 26.2–38.6)
AST SERPL-CCNC: 16 U/L (ref 15–37)
BASOPHILS ABSOLUTE: 0 K/UL (ref 0–0.2)
BASOPHILS RELATIVE PERCENT: 0.2 %
BILIRUB SERPL-MCNC: 0.6 MG/DL (ref 0–1)
BILIRUBIN URINE: NEGATIVE
BLOOD, URINE: NEGATIVE
BUN BLDV-MCNC: 13 MG/DL (ref 7–20)
CALCIUM SERPL-MCNC: 9.1 MG/DL (ref 8.3–10.6)
CHLORIDE BLD-SCNC: 100 MMOL/L (ref 99–110)
CLARITY: CLEAR
CO2: 26 MMOL/L (ref 21–32)
COLOR: YELLOW
CREAT SERPL-MCNC: 0.9 MG/DL (ref 0.9–1.3)
EOSINOPHILS ABSOLUTE: 0.1 K/UL (ref 0–0.6)
EOSINOPHILS RELATIVE PERCENT: 1.6 %
GFR AFRICAN AMERICAN: >60
GFR NON-AFRICAN AMERICAN: >60
GLUCOSE BLD-MCNC: 151 MG/DL (ref 70–99)
GLUCOSE URINE: NEGATIVE MG/DL
HCT VFR BLD CALC: 42.7 % (ref 40.5–52.5)
HEMOGLOBIN: 14.6 G/DL (ref 13.5–17.5)
INR BLD: 1.02 (ref 0.88–1.12)
KETONES, URINE: NEGATIVE MG/DL
LACTIC ACID: 1 MMOL/L (ref 0.4–2)
LEUKOCYTE ESTERASE, URINE: NEGATIVE
LIPASE: 40 U/L (ref 13–60)
LYMPHOCYTES ABSOLUTE: 1 K/UL (ref 1–5.1)
LYMPHOCYTES RELATIVE PERCENT: 13.1 %
MCH RBC QN AUTO: 32.8 PG (ref 26–34)
MCHC RBC AUTO-ENTMCNC: 34.2 G/DL (ref 31–36)
MCV RBC AUTO: 95.7 FL (ref 80–100)
MICROSCOPIC EXAMINATION: NORMAL
MONOCYTES ABSOLUTE: 0.7 K/UL (ref 0–1.3)
MONOCYTES RELATIVE PERCENT: 9.6 %
NEUTROPHILS ABSOLUTE: 5.8 K/UL (ref 1.7–7.7)
NEUTROPHILS RELATIVE PERCENT: 75.5 %
NITRITE, URINE: NEGATIVE
PDW BLD-RTO: 12.6 % (ref 12.4–15.4)
PH UA: 7.5 (ref 5–8)
PLATELET # BLD: 249 K/UL (ref 135–450)
PMV BLD AUTO: 7.8 FL (ref 5–10.5)
POTASSIUM REFLEX MAGNESIUM: 3.7 MMOL/L (ref 3.5–5.1)
PROTEIN UA: NEGATIVE MG/DL
PROTHROMBIN TIME: 11.5 SEC (ref 9.9–12.7)
RBC # BLD: 4.46 M/UL (ref 4.2–5.9)
SODIUM BLD-SCNC: 137 MMOL/L (ref 136–145)
SPECIFIC GRAVITY UA: 1.01 (ref 1–1.03)
TOTAL PROTEIN: 7.8 G/DL (ref 6.4–8.2)
TROPONIN: <0.01 NG/ML
URINE REFLEX TO CULTURE: NORMAL
URINE TYPE: NORMAL
UROBILINOGEN, URINE: 0.2 E.U./DL
WBC # BLD: 7.7 K/UL (ref 4–11)

## 2022-02-10 PROCEDURE — 74176 CT ABD & PELVIS W/O CONTRAST: CPT

## 2022-02-10 PROCEDURE — 87635 SARS-COV-2 COVID-19 AMP PRB: CPT

## 2022-02-10 PROCEDURE — 85301 ANTITHROMBIN III ANTIGEN: CPT

## 2022-02-10 PROCEDURE — 85300 ANTITHROMBIN III ACTIVITY: CPT

## 2022-02-10 PROCEDURE — 85303 CLOT INHIBIT PROT C ACTIVITY: CPT

## 2022-02-10 PROCEDURE — 86147 CARDIOLIPIN ANTIBODY EA IG: CPT

## 2022-02-10 PROCEDURE — 85613 RUSSELL VIPER VENOM DILUTED: CPT

## 2022-02-10 PROCEDURE — 99284 EMERGENCY DEPT VISIT MOD MDM: CPT

## 2022-02-10 PROCEDURE — 85730 THROMBOPLASTIN TIME PARTIAL: CPT

## 2022-02-10 PROCEDURE — 93005 ELECTROCARDIOGRAM TRACING: CPT | Performed by: EMERGENCY MEDICINE

## 2022-02-10 PROCEDURE — 6360000002 HC RX W HCPCS: Performed by: EMERGENCY MEDICINE

## 2022-02-10 PROCEDURE — 2580000003 HC RX 258: Performed by: EMERGENCY MEDICINE

## 2022-02-10 PROCEDURE — 83605 ASSAY OF LACTIC ACID: CPT

## 2022-02-10 PROCEDURE — 1200000000 HC SEMI PRIVATE

## 2022-02-10 PROCEDURE — 6360000004 HC RX CONTRAST MEDICATION: Performed by: EMERGENCY MEDICINE

## 2022-02-10 PROCEDURE — 85302 CLOT INHIBIT PROT C ANTIGEN: CPT

## 2022-02-10 PROCEDURE — 81003 URINALYSIS AUTO W/O SCOPE: CPT

## 2022-02-10 PROCEDURE — 84484 ASSAY OF TROPONIN QUANT: CPT

## 2022-02-10 PROCEDURE — 80053 COMPREHEN METABOLIC PANEL: CPT

## 2022-02-10 PROCEDURE — 83690 ASSAY OF LIPASE: CPT

## 2022-02-10 PROCEDURE — 85306 CLOT INHIBIT PROT S FREE: CPT

## 2022-02-10 PROCEDURE — 36415 COLL VENOUS BLD VENIPUNCTURE: CPT

## 2022-02-10 PROCEDURE — 85610 PROTHROMBIN TIME: CPT

## 2022-02-10 PROCEDURE — 6370000000 HC RX 637 (ALT 250 FOR IP): Performed by: EMERGENCY MEDICINE

## 2022-02-10 PROCEDURE — 87040 BLOOD CULTURE FOR BACTERIA: CPT

## 2022-02-10 PROCEDURE — 85025 COMPLETE CBC W/AUTO DIFF WBC: CPT

## 2022-02-10 PROCEDURE — 85220 BLOOC CLOT FACTOR V TEST: CPT

## 2022-02-10 PROCEDURE — 71260 CT THORAX DX C+: CPT

## 2022-02-10 PROCEDURE — 85305 CLOT INHIBIT PROT S TOTAL: CPT

## 2022-02-10 PROCEDURE — 81241 F5 GENE: CPT

## 2022-02-10 RX ORDER — HEPARIN SODIUM 10000 [USP'U]/100ML
0-3000 INJECTION, SOLUTION INTRAVENOUS CONTINUOUS
Status: DISCONTINUED | OUTPATIENT
Start: 2022-02-10 | End: 2022-02-11

## 2022-02-10 RX ORDER — ACETAMINOPHEN 500 MG
1000 TABLET ORAL ONCE
Status: COMPLETED | OUTPATIENT
Start: 2022-02-10 | End: 2022-02-10

## 2022-02-10 RX ORDER — HEPARIN SODIUM 1000 [USP'U]/ML
30 INJECTION, SOLUTION INTRAVENOUS; SUBCUTANEOUS PRN
Status: DISCONTINUED | OUTPATIENT
Start: 2022-02-10 | End: 2022-02-10

## 2022-02-10 RX ORDER — HEPARIN SODIUM 1000 [USP'U]/ML
4000 INJECTION, SOLUTION INTRAVENOUS; SUBCUTANEOUS ONCE
Status: COMPLETED | OUTPATIENT
Start: 2022-02-10 | End: 2022-02-10

## 2022-02-10 RX ORDER — HEPARIN SODIUM 1000 [USP'U]/ML
60 INJECTION, SOLUTION INTRAVENOUS; SUBCUTANEOUS PRN
Status: DISCONTINUED | OUTPATIENT
Start: 2022-02-10 | End: 2022-02-10

## 2022-02-10 RX ADMIN — IOPAMIDOL 75 ML: 755 INJECTION, SOLUTION INTRAVENOUS at 21:25

## 2022-02-10 RX ADMIN — HEPARIN SODIUM 1000 UNITS/HR: 10000 INJECTION INTRAVENOUS; SUBCUTANEOUS at 23:43

## 2022-02-10 RX ADMIN — HEPARIN SODIUM 4000 UNITS: 1000 INJECTION INTRAVENOUS; SUBCUTANEOUS at 23:40

## 2022-02-10 RX ADMIN — ACETAMINOPHEN 1000 MG: 500 TABLET ORAL at 22:11

## 2022-02-10 ASSESSMENT — PAIN DESCRIPTION - LOCATION: LOCATION: FLANK

## 2022-02-10 ASSESSMENT — PAIN DESCRIPTION - PAIN TYPE: TYPE: ACUTE PAIN

## 2022-02-10 ASSESSMENT — ENCOUNTER SYMPTOMS
RHINORRHEA: 0
SORE THROAT: 0
CHEST TIGHTNESS: 0
NAUSEA: 0
SHORTNESS OF BREATH: 0
DIARRHEA: 0
COLOR CHANGE: 0
VOMITING: 0
COUGH: 0
WHEEZING: 0
ABDOMINAL PAIN: 0

## 2022-02-10 ASSESSMENT — PAIN DESCRIPTION - DESCRIPTORS: DESCRIPTORS: ACHING

## 2022-02-10 ASSESSMENT — PAIN DESCRIPTION - FREQUENCY: FREQUENCY: CONTINUOUS

## 2022-02-10 ASSESSMENT — PAIN - FUNCTIONAL ASSESSMENT: PAIN_FUNCTIONAL_ASSESSMENT: PREVENTS OR INTERFERES SOME ACTIVE ACTIVITIES AND ADLS

## 2022-02-10 ASSESSMENT — PAIN DESCRIPTION - ONSET: ONSET: ON-GOING

## 2022-02-10 ASSESSMENT — PAIN SCALES - GENERAL
PAINLEVEL_OUTOF10: 8
PAINLEVEL_OUTOF10: 9
PAINLEVEL_OUTOF10: 2

## 2022-02-10 ASSESSMENT — PAIN DESCRIPTION - ORIENTATION: ORIENTATION: RIGHT

## 2022-02-10 ASSESSMENT — PAIN DESCRIPTION - PROGRESSION: CLINICAL_PROGRESSION: NOT CHANGED
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
## 2022-02-10 NOTE — TELEPHONE ENCOUNTER
Call placed to patient he is aware of Dr. Justin Keating recommendation to go to the ER. Patient sttaed that he has appointment to go to ortho dr. Glynn Pandey. I stated if you would like to call them to get a second opinion you can but as of now Dr. Justin Keating recommendation is to go to the ER.

## 2022-02-10 NOTE — TELEPHONE ENCOUNTER
Pt called to let Dr Raiza Pineda know that he had left shoulder surgery on 2/2/22. He started having pain on the right side  middle of his back. He states that it hurts to the touch, temp is up and down, spiting up blood this morning and a little blood coming out of his nose when blowing it. He also has a dry cough off and on. Please give pt a call [phone]. He has an appt with the surgeon tomorrow .

## 2022-02-11 ENCOUNTER — TELEPHONE (OUTPATIENT)
Dept: ORTHOPEDIC SURGERY | Age: 47
End: 2022-02-11

## 2022-02-11 VITALS
TEMPERATURE: 99 F | WEIGHT: 188.49 LBS | DIASTOLIC BLOOD PRESSURE: 86 MMHG | OXYGEN SATURATION: 97 % | SYSTOLIC BLOOD PRESSURE: 124 MMHG | HEART RATE: 98 BPM | BODY MASS INDEX: 25.53 KG/M2 | HEIGHT: 72 IN | RESPIRATION RATE: 16 BRPM

## 2022-02-11 PROBLEM — I26.99 RIGHT PULMONARY EMBOLUS (HCC): Status: ACTIVE | Noted: 2022-02-10

## 2022-02-11 LAB
ANION GAP SERPL CALCULATED.3IONS-SCNC: 14 MMOL/L (ref 3–16)
APTT: 124.4 SEC (ref 26.2–38.6)
APTT: 46.5 SEC (ref 26.2–38.6)
APTT: 86.4 SEC (ref 26.2–38.6)
BASOPHILS ABSOLUTE: 0 K/UL (ref 0–0.2)
BASOPHILS RELATIVE PERCENT: 0.5 %
BUN BLDV-MCNC: 12 MG/DL (ref 7–20)
CALCIUM SERPL-MCNC: 9.2 MG/DL (ref 8.3–10.6)
CHLORIDE BLD-SCNC: 100 MMOL/L (ref 99–110)
CO2: 24 MMOL/L (ref 21–32)
CREAT SERPL-MCNC: 1.1 MG/DL (ref 0.9–1.3)
EKG ATRIAL RATE: 100 BPM
EKG DIAGNOSIS: NORMAL
EKG P AXIS: 63 DEGREES
EKG P-R INTERVAL: 156 MS
EKG Q-T INTERVAL: 346 MS
EKG QRS DURATION: 114 MS
EKG QTC CALCULATION (BAZETT): 446 MS
EKG R AXIS: 63 DEGREES
EKG T AXIS: 29 DEGREES
EKG VENTRICULAR RATE: 100 BPM
EOSINOPHILS ABSOLUTE: 0.1 K/UL (ref 0–0.6)
EOSINOPHILS RELATIVE PERCENT: 2.3 %
GFR AFRICAN AMERICAN: >60
GFR NON-AFRICAN AMERICAN: >60
GLUCOSE BLD-MCNC: 108 MG/DL (ref 70–99)
HCT VFR BLD CALC: 39.2 % (ref 40.5–52.5)
HEMOGLOBIN: 13.7 G/DL (ref 13.5–17.5)
LV EF: 58 %
LVEF MODALITY: NORMAL
LYMPHOCYTES ABSOLUTE: 1.6 K/UL (ref 1–5.1)
LYMPHOCYTES RELATIVE PERCENT: 26.3 %
MAGNESIUM: 2.1 MG/DL (ref 1.8–2.4)
MCH RBC QN AUTO: 33.2 PG (ref 26–34)
MCHC RBC AUTO-ENTMCNC: 35 G/DL (ref 31–36)
MCV RBC AUTO: 95 FL (ref 80–100)
MONOCYTES ABSOLUTE: 0.7 K/UL (ref 0–1.3)
MONOCYTES RELATIVE PERCENT: 12.4 %
NEUTROPHILS ABSOLUTE: 3.5 K/UL (ref 1.7–7.7)
NEUTROPHILS RELATIVE PERCENT: 58.5 %
PDW BLD-RTO: 12.6 % (ref 12.4–15.4)
PLATELET # BLD: 232 K/UL (ref 135–450)
PMV BLD AUTO: 8.4 FL (ref 5–10.5)
POTASSIUM SERPL-SCNC: 4 MMOL/L (ref 3.5–5.1)
RBC # BLD: 4.13 M/UL (ref 4.2–5.9)
SARS-COV-2, NAAT: NOT DETECTED
SODIUM BLD-SCNC: 138 MMOL/L (ref 136–145)
WBC # BLD: 5.9 K/UL (ref 4–11)

## 2022-02-11 PROCEDURE — 81241 F5 GENE: CPT

## 2022-02-11 PROCEDURE — 85730 THROMBOPLASTIN TIME PARTIAL: CPT

## 2022-02-11 PROCEDURE — 93010 ELECTROCARDIOGRAM REPORT: CPT | Performed by: INTERNAL MEDICINE

## 2022-02-11 PROCEDURE — 85598 HEXAGNAL PHOSPH PLTLT NEUTRL: CPT

## 2022-02-11 PROCEDURE — 85613 RUSSELL VIPER VENOM DILUTED: CPT

## 2022-02-11 PROCEDURE — 80048 BASIC METABOLIC PNL TOTAL CA: CPT

## 2022-02-11 PROCEDURE — 6360000002 HC RX W HCPCS: Performed by: STUDENT IN AN ORGANIZED HEALTH CARE EDUCATION/TRAINING PROGRAM

## 2022-02-11 PROCEDURE — 6370000000 HC RX 637 (ALT 250 FOR IP): Performed by: NURSE PRACTITIONER

## 2022-02-11 PROCEDURE — 87040 BLOOD CULTURE FOR BACTERIA: CPT

## 2022-02-11 PROCEDURE — 85306 CLOT INHIBIT PROT S FREE: CPT

## 2022-02-11 PROCEDURE — 99222 1ST HOSP IP/OBS MODERATE 55: CPT | Performed by: NURSE PRACTITIONER

## 2022-02-11 PROCEDURE — 36415 COLL VENOUS BLD VENIPUNCTURE: CPT

## 2022-02-11 PROCEDURE — 85025 COMPLETE CBC W/AUTO DIFF WBC: CPT

## 2022-02-11 PROCEDURE — 86147 CARDIOLIPIN ANTIBODY EA IG: CPT

## 2022-02-11 PROCEDURE — 83735 ASSAY OF MAGNESIUM: CPT

## 2022-02-11 PROCEDURE — 81240 F2 GENE: CPT

## 2022-02-11 PROCEDURE — 85732 THROMBOPLASTIN TIME PARTIAL: CPT

## 2022-02-11 PROCEDURE — 2580000003 HC RX 258: Performed by: STUDENT IN AN ORGANIZED HEALTH CARE EDUCATION/TRAINING PROGRAM

## 2022-02-11 PROCEDURE — 93306 TTE W/DOPPLER COMPLETE: CPT

## 2022-02-11 PROCEDURE — 85610 PROTHROMBIN TIME: CPT

## 2022-02-11 PROCEDURE — 94760 N-INVAS EAR/PLS OXIMETRY 1: CPT

## 2022-02-11 PROCEDURE — 85597 PHOSPHOLIPID PLTLT NEUTRALIZ: CPT

## 2022-02-11 PROCEDURE — 93970 EXTREMITY STUDY: CPT

## 2022-02-11 PROCEDURE — 85670 THROMBIN TIME PLASMA: CPT

## 2022-02-11 PROCEDURE — 85303 CLOT INHIBIT PROT C ACTIVITY: CPT

## 2022-02-11 RX ORDER — SODIUM CHLORIDE 9 MG/ML
25 INJECTION, SOLUTION INTRAVENOUS PRN
Status: DISCONTINUED | OUTPATIENT
Start: 2022-02-11 | End: 2022-02-11 | Stop reason: HOSPADM

## 2022-02-11 RX ORDER — TAMSULOSIN HYDROCHLORIDE 0.4 MG/1
0.4 CAPSULE ORAL DAILY
Status: DISCONTINUED | OUTPATIENT
Start: 2022-02-11 | End: 2022-02-11 | Stop reason: HOSPADM

## 2022-02-11 RX ORDER — HEPARIN SODIUM 10000 [USP'U]/100ML
0-3000 INJECTION, SOLUTION INTRAVENOUS CONTINUOUS
Status: DISCONTINUED | OUTPATIENT
Start: 2022-02-11 | End: 2022-02-11

## 2022-02-11 RX ORDER — POLYETHYLENE GLYCOL 3350 17 G/17G
17 POWDER, FOR SOLUTION ORAL DAILY PRN
Status: DISCONTINUED | OUTPATIENT
Start: 2022-02-11 | End: 2022-02-11 | Stop reason: HOSPADM

## 2022-02-11 RX ORDER — ONDANSETRON 2 MG/ML
4 INJECTION INTRAMUSCULAR; INTRAVENOUS EVERY 6 HOURS PRN
Status: DISCONTINUED | OUTPATIENT
Start: 2022-02-11 | End: 2022-02-11 | Stop reason: HOSPADM

## 2022-02-11 RX ORDER — ACETAMINOPHEN 650 MG/1
650 SUPPOSITORY RECTAL EVERY 6 HOURS PRN
Status: DISCONTINUED | OUTPATIENT
Start: 2022-02-11 | End: 2022-02-11 | Stop reason: HOSPADM

## 2022-02-11 RX ORDER — HEPARIN SODIUM 1000 [USP'U]/ML
40 INJECTION, SOLUTION INTRAVENOUS; SUBCUTANEOUS PRN
Status: DISCONTINUED | OUTPATIENT
Start: 2022-02-11 | End: 2022-02-11

## 2022-02-11 RX ORDER — SODIUM CHLORIDE 0.9 % (FLUSH) 0.9 %
5-40 SYRINGE (ML) INJECTION EVERY 12 HOURS SCHEDULED
Status: DISCONTINUED | OUTPATIENT
Start: 2022-02-11 | End: 2022-02-11 | Stop reason: HOSPADM

## 2022-02-11 RX ORDER — HEPARIN SODIUM 1000 [USP'U]/ML
2800 INJECTION, SOLUTION INTRAVENOUS; SUBCUTANEOUS ONCE
Status: COMPLETED | OUTPATIENT
Start: 2022-02-11 | End: 2022-02-11

## 2022-02-11 RX ORDER — HEPARIN SODIUM 1000 [USP'U]/ML
80 INJECTION, SOLUTION INTRAVENOUS; SUBCUTANEOUS PRN
Status: DISCONTINUED | OUTPATIENT
Start: 2022-02-11 | End: 2022-02-11

## 2022-02-11 RX ORDER — ACETAMINOPHEN 325 MG/1
650 TABLET ORAL EVERY 6 HOURS PRN
Status: DISCONTINUED | OUTPATIENT
Start: 2022-02-11 | End: 2022-02-11 | Stop reason: HOSPADM

## 2022-02-11 RX ORDER — SODIUM CHLORIDE 0.9 % (FLUSH) 0.9 %
5-40 SYRINGE (ML) INJECTION PRN
Status: DISCONTINUED | OUTPATIENT
Start: 2022-02-11 | End: 2022-02-11 | Stop reason: HOSPADM

## 2022-02-11 RX ADMIN — APIXABAN 10 MG: 5 TABLET, FILM COATED ORAL at 17:56

## 2022-02-11 RX ADMIN — Medication 1540 UNITS/HR: at 01:08

## 2022-02-11 RX ADMIN — HEPARIN SODIUM 2800 UNITS: 1000 INJECTION INTRAVENOUS; SUBCUTANEOUS at 01:03

## 2022-02-11 RX ADMIN — Medication 1450 UNITS/HR: at 15:32

## 2022-02-11 ASSESSMENT — PAIN SCALES - GENERAL: PAINLEVEL_OUTOF10: 0

## 2022-02-11 NOTE — PROGRESS NOTES
Medication Reconciliation     List of medications patient is currently taking is complete. Source of information:   1. Conversation with patient at bedside  2. EPIC records        Notes regarding home medications:  1. Patient received some of his home medications today. 2. D/c docusate and probiotic. 3. Next testosterone shot is on 2/15.   4. No other OTC or supplements    Rell Chou, Pharmacy Intern  2/10/2022 10:32 PM
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
## 2022-02-11 NOTE — CARE COORDINATION
INITIAL CASE MANAGEMENT ASSESSMENT    Reviewed chart, met with patient to assess possible discharge needs. Explained Case Management role/services. Living Situation: Patient lives alone in [place] in an apartment with an elevator. ADLs: Independent     DME: None    PT/OT Recs: None     Active Services: None     Transportation: Active /Mother will transport     Medications: [facility] in [place], [place]/No barriers    PCP: [place] , DO      HD/PD: N/A    PLAN/COMMENTS: Patient plans to return to home. Denies needs. SW/CM provided contact information for patient or family to call with any questions. SW/CM will follow and assist as needed.   Electronically signed by Carol Dejesus RN on 2/11/2022 at 2:45 PM
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
## 2022-02-11 NOTE — ED PROVIDER NOTES
[facility]  EMERGENCY DEPARTMENTENCOUNTER      Pt Name: Yamilet Kidd  MRN: [id number]  [place] 1975  Date ofevaluation: 2/10/2022  Provider: Michael Dietrich MD    CHIEF COMPLAINT       Chief Complaint   Patient presents with    Cough     Pt reports worsening \"right side pain since Sunday\". Pt also states that he has had cough \"with a little blood in it\". Pt alert and oriented at this time with no signs of distress noted. Pt rates pain as a 8/10.  Flank Pain         HISTORY OF PRESENT ILLNESS   (Location/Symptom, Timing/Onset,Context/Setting, Quality, Duration, Modifying Factors, Severity)  Note limiting factors. Yamilet Kidd is a 40 y.o. male  who  has a past medical history of Anxiety, BPH with urinary obstruction, Chronic seasonal allergic rhinitis due to pollen, Condyloma acuminata, Erectile dysfunction of organic origin, Herpes dermatitis, Irritable bowel syndrome with diarrhea, Major depression single episode, in partial remission ([address] and Testosterone deficiency. who presents to the emergency department     67-year-old male who presents for right-sided chest pain radiating to the right flank with associated pain on deep breathing and hemoptysis. Has been going on for the last 4 days. Sudden onset constant unchanging. No significant shortness of breath, does note some fevers no chills. Of note patient recently had left rotator cuff surgery. No prior history of DVT or PE. Not on anticoagulation. Nothing seems to make his symptoms better. Worse with deep breathing. No other risk factors besides recent surgery. Pain is sharp in nature. Mild to moderate. Hemoptysis is small flecks of blood only 1-2 instances. No other associated symptoms signs as listed for further review of systems see below. NursingNotes were reviewed.     REVIEW OF SYSTEMS    (2-9 systems for level 4, 10 or more for level 5)     Review of Systems Constitutional: Negative. Negative for fatigue and fever. HENT: Negative for congestion, rhinorrhea and sore throat. Respiratory: Negative for cough, chest tightness, shortness of breath and wheezing. Hemoptysis   Cardiovascular: Positive for chest pain. Gastrointestinal: Negative for abdominal pain, diarrhea, nausea and vomiting. Genitourinary: Positive for flank pain. Skin: Negative for color change and rash. Allergic/Immunologic: Negative for immunocompromised state. Neurological: Negative for dizziness, light-headedness and headaches. Hematological: Does not bruise/bleed easily. All other systems reviewed and are negative. Except as noted above the remainder of the review of systems was reviewed and negative. PAST MEDICAL HISTORY     Past Medical History:   Diagnosis Date    Anxiety     BPH with urinary obstruction     Chronic seasonal allergic rhinitis due to pollen     Condyloma acuminata     Erectile dysfunction of organic origin     Herpes dermatitis     Nostril    Irritable bowel syndrome with diarrhea     Major depression single episode, in partial remission ([facility] [address]      no meds    Testosterone deficiency          SURGICALHISTORY       Past Surgical History:   Procedure Laterality Date    BICEPS TENDON REPAIR Left 2/2/2022    DEBRIDEMENT BICEP TENODESIS performed by Agatha Lesches, MD at [facility] Right 12/20/2018    RIGHT CARPAL TUNNEL RELEASE performed by Emeli Meyer MD at [place] [address]  2008   [address] Left Age 7 or 8    HAND SURGERY Right 2010    Thumb ORIF    KNEE ARTHROSCOPY Left 4/28/2021    LEFT KNEE ARTHROSCOPY, MEDIAL MENISCECTOMY performed by Agatha Lesches, MD at [facility] with laser.      CT REPAIR MAJOR PERIPHERAL NERVE Right 12/20/2018    RIGHT ULNAR NERVE DECOMPRESSION AT ELBOW performed by Emeli Meyer MD at [facility] Left 2/2/2022    LEFT SHOULDER ARTHROSCOPY performed by Rosa Sosa MD at [facility] Right 2000    WRIST SURGERY Right 2009    cyst removed    WRIST SURGERY Left 2012    Cyst removal         CURRENT MEDICATIONS       Previous Medications    DOCUSATE SODIUM (COLACE) 100 MG CAPSULE    Take 1 capsule by mouth 2 times daily Post-op    IBUPROFEN (ADVIL;MOTRIN) 200 MG TABLET    Take 400 mg by mouth as needed for Pain     PROBIOTIC PRODUCT (PROBIOTIC DAILY PO)    Take 1 tablet by mouth as needed     SILDENAFIL (REVATIO) 20 MG TABLET    Take 1-2 tablets by mouth daily as needed (erectile dysfunction)    TAMSULOSIN (FLOMAX) 0.4 MG CAPSULE    Take 1 capsule by mouth daily    TESTOSTERONE CYPIONATE (DEPOTESTOTERONE CYPIONATE) 200 MG/ML INJECTION    150mg every 2 weeks IM    VALACYCLOVIR (VALTREX) 1 G TABLET    TAKE 2 TABLETS TWICE DAILY FOR ONE DAY FOR OUTBREAKS. Patient has no known allergies.     FAMILY HISTORY       Family History   Problem Relation Age of Onset    No Known Problems Mother     High Blood Pressure Father     No Known Problems Sister     No Known Problems Brother     No Known Problems Maternal Grandmother     Heart Disease Maternal Grandfather         MI    Cancer Paternal Grandmother         Colon    Stroke Paternal Grandfather     No Known Problems Maternal Uncle     Cancer Paternal Aunt         Colon    No Known Problems Paternal Uncle           SOCIAL HISTORY       Social History     Socioeconomic History    Marital status: Single     Spouse name: None    Number of children: 0    Years of education: None    Highest education level: None   Occupational History    Occupation:    Tobacco Use    Smoking status: Never Smoker    Smokeless tobacco: Never Used   Vaping Use    Vaping Use: Never used   Substance and Sexual Activity    Alcohol use: Yes     Comment: Occasional    Drug use: Never     Comment: LAST [id number]    Sexual activity: Yes     Partners: Female   Other Topics Concern    None   Social History Narrative    None     Social Determinants of Health     Financial Resource Strain: Low Risk     Difficulty of Paying Living Expenses: Not hard at all   Food Insecurity: No Food Insecurity    Worried About Running Out of Food in the Last Year: Never true    Faye of Food in the Last Year: Never true   Transportation Needs:     Lack of Transportation (Medical): Not on file    Lack of Transportation (Non-Medical): Not on file   Physical Activity: Sufficiently Active    Days of Exercise per Week: 4 days    Minutes of Exercise per Session: 60 min   Stress:     Feeling of Stress : Not on file   Social Connections:     Frequency of Communication with Friends and Family: Not on file    Frequency of Social Gatherings with Friends and Family: Not on file    Attends Catholic Services: Not on file    Active Member of Clubs or Organizations: Not on file    Attends Club or Organization Meetings: Not on file    Marital Status: Not on file   Intimate Partner Violence: Not At Risk    Fear of Current or Ex-Partner: No    Emotionally Abused: No    Physically Abused: No    Sexually Abused: No   Housing Stability:     Unable to Pay for Housing in the Last Year: Not on file    Number of [place] in the Last Year: Not on file    Unstable Housing in the Last Year: Not on file       SCREENINGS             PHYSICAL EXAM    (up to 7 for level 4, 8 or more for level 5)     ED Triage Vitals [02/10/22 1945]   BP Temp Temp Source Pulse Resp SpO2 Height Weight   (!) 154/88 100.5 °F (38.1 °C) Temporal 103 18 98 % -- --       Physical Exam  Vitals and nursing note reviewed. Constitutional:       General: He is not in acute distress. Appearance: Normal appearance. He is well-developed and normal weight. He is not ill-appearing, toxic-appearing or diaphoretic. HENT:      Head: Normocephalic and atraumatic.       Mouth/Throat: Mouth: Mucous membranes are moist.      Pharynx: Oropharynx is clear. Eyes:      Extraocular Movements: Extraocular movements intact. Cardiovascular:      Rate and Rhythm: Regular rhythm. Tachycardia present. Pulses: Normal pulses. Heart sounds: Normal heart sounds. No murmur heard. No gallop. Pulmonary:      Effort: Pulmonary effort is normal. No respiratory distress. Breath sounds: Normal breath sounds. No decreased breath sounds, wheezing, rhonchi or rales. Chest:      Chest wall: No tenderness. Abdominal:      General: Bowel sounds are normal.      Palpations: Abdomen is soft. Tenderness: There is no abdominal tenderness. There is no right CVA tenderness or left CVA tenderness. Musculoskeletal:      Cervical back: Normal range of motion and neck supple. Right lower leg: No edema. Left lower leg: No edema. Comments: Left shoulder with some pain with movement still in sling after rotator cuff surgery   Skin:     General: Skin is warm and dry. Capillary Refill: Capillary refill takes less than 2 seconds. Findings: No rash. Neurological:      General: No focal deficit present. Mental Status: He is alert.    Psychiatric:         Mood and Affect: Mood normal.         Behavior: Behavior normal.         RESULTS     EKG: All EKG's are interpreted by the Emergency Department Physician who either signs or Co-signsthis chart in the absence of a cardiologist.    EKG Interpretation    Interpreted by emergency department physician    Rhythm: normal sinus   Rate: 100-110  Axis: normal  Ectopy: none  Conduction: right bundle branch block (incomplete)  ST Segments: normal  T Waves: normal  Q Waves: none    Clinical Impression: right bundle branch block    Dennis Galvan MD      RADIOLOGY:   Non-plain filmimages such as CT, Ultrasound and MRI are read by the radiologist.     Interpretation per the Radiologist below, if available at the time ofthis note:    CT CHEST PULMONARY EMBOLISM W CONTRAST   Final Result   1. Positive for pulmonary embolism at the lobar and segmental level in the   right lower lobe. No CT evidence for right heart strain. 2.  Focal pleural based opacity in the right lower lobe may represent   developing infarct. Associated nonspecific ground-glass opacities in the   right lower lobe and trace right effusion. 3.  Noncontrast exam of the abdomen and pelvis reveals no acute inflammatory   process. Bilateral punctate nonobstructing renal calculi are present. 4.  Diverticulosis. Critical results were called by Dr. Vimal Bolaños to Dr. Ruben Pfeiffer on   2/10/2022 at 22:12. CT ABDOMEN PELVIS WO CONTRAST Additional Contrast? None   Final Result   1. Positive for pulmonary embolism at the lobar and segmental level in the   right lower lobe. No CT evidence for right heart strain. 2.  Focal pleural based opacity in the right lower lobe may represent   developing infarct. Associated nonspecific ground-glass opacities in the   right lower lobe and trace right effusion. 3.  Noncontrast exam of the abdomen and pelvis reveals no acute inflammatory   process. Bilateral punctate nonobstructing renal calculi are present. 4.  Diverticulosis. Critical results were called by Dr. Vimal Bolaños to Dr. Ruben Pfeiffer on   2/10/2022 at 22:12.                ED BEDSIDE ULTRASOUND:   Performed by ED Physician - none    LABS:  Labs Reviewed   COMPREHENSIVE METABOLIC PANEL W/ REFLEX TO MG FOR LOW K - Abnormal; Notable for the following components:       Result Value    Glucose 151 (*)     All other components within normal limits    Narrative:     Performed at:  [place] [address] [place], [place] [address]   Phone [phone]   CULTURE, BLOOD 1   CULTURE, BLOOD 2   CBC WITH AUTO DIFFERENTIAL    Narrative:     Performed at:  [facility]  [address] [place] [address] [address]  [place], [place] [address]   Phone [phone]   TROPONIN    Narrative:     Performed at:  [facility]  [address] [facility] [place] [address]   Phone [phone]   URINE RT REFLEX TO CULTURE    Narrative:     Performed at:  [facility]  [address] [facility] [place] [address]   Phone [phone]   LACTIC ACID, PLASMA    Narrative:     Performed at:  [facility]  [address] [facility] [place] [address]   Phone [phone]   PROTIME-INR    Narrative:     Performed at:  [facility]  [address], [place] [address]   Phone [phone]   APTT    Narrative:     Performed at:  [facility]  [address] [facility] [place] [address]   Phone [phone]   LIPASE    Narrative:     Performed at:  [facility]  [address], [place] [address]   Phone [phone]       All other labs were within normal range or not returned as of this dictation.     EMERGENCY DEPARTMENT COURSE and DIFFERENTIAL DIAGNOSIS/MDM:   Vitals:    Vitals:    02/10/22 1945 02/10/22 2213   BP: (!) 154/88 139/65   Pulse: 103 97   Resp: 18 18   Temp: 100.5 °F (38.1 °C)    TempSrc: Temporal    SpO2: 98% 100%       Patient was given thefollowing medications:  Medications   heparin (porcine) injection 5,140 Units (has no administration in time range)   heparin (porcine) injection 5,140 Units (has no administration in time range)   heparin (porcine) injection 2,570 Units (has no administration in time range)   heparin 25,000 unit in sodium chloride 0.45% 250 mL (premix) infusion (has no administration in time range)   iopamidol (ISOVUE-370) 76 % injection 75 mL (75 mLs IntraVENous Given 2/10/22 2125)   acetaminophen (TYLENOL) tablet 1,000 mg (1,000 mg Oral Given 2/10/22 2211)       ED COURSE & MEDICAL DECISION MAKING Pertinent Labs & Imaging studies reviewed. (See chart for details)   -  Patient seen and evaluated in the emergency department. -  Triage and nursing notes reviewed and incorporated. -  Old chart records reviewed and incorporated. -  Differential diagnosis includes: Differential Diagnosis: Bronchitis, pulmonary embolus, ACS, postop fever, pneumonia, kidney Stone, Pyelonephritis, Renal Artery Aneurysm,  Abdominal Aortic Aneurysm, Metastases to back, Mechanical Back Pain, Cauda Equina Syndrome    42-year-old male with nonreproducible right chest wall pain and right flank pain without CVA tenderness. Patient is mildly febrile tachycardic saturating well on room air. No respiratory findings on exam.  High concern for PE and/or infection given recent surgery. Will obtain cardiac work-up as well as CT chest for PE and CT abdomen pelvis for renal stones. Urinalysis ordered. EKG shows incomplete right bundle branch block and sinus tachycardia. -  Work-up included:  See above  -  ED treatment included: See above  -  Results discussed with patient. REASSESSMENT     ED Course as of 02/10/22 2231   Thu Feb 10, 2022   2210 Received notification from [facility] the patient has lobar PE with signs of infarct. Will consult cardiology. Patient is tachycardic however troponin is negative. No signs of active right heart strain however will start patient on heparin and admit to medicine. [SC]   0879 Susannah Jasso Spoke with Dr. Nita Abebe with cardiology who recommends placing the patient on heparin and admitting to medicine and he will attempt thrombectomy [SC]      ED Course User Index  [SC] Lyn Nuñez MD       CRITICAL CARE TIME   Total Critical Care time was 30 minutes, excluding separately reportable procedures. There was a high probability of clinically significant/life threatening deterioration in the patient's condition which required my urgent intervention.       CONSULTS:  IP CONSULT TO CARDIOLOGY  IP CONSULT TO HOSPITALIST    PROCEDURES:  Unless otherwise noted below, none     Procedures    FINAL IMPRESSION      1. Other acute pulmonary embolism without acute cor pulmonale ([facility] [address]    2. Pulmonary infarct ([facility] [address]    3. Hemoptysis          DISPOSITION/PLAN   DISPOSITION Decision To Admit 02/10/2022 10:25:20 PM      PATIENT REFERREDTO:  No follow-up provider specified.     DISCHARGEMEDICATIONS:  New Prescriptions    No medications on file          (Please note that portions of this note were completed with a voice recognition program.  Efforts were made to edit the dictations but occasionally words are mis-transcribed.)    Sandi Fischer MD (electronically signed)  Attending Emergency Physician         Sandi Fischer MD  02/10/22 6338

## 2022-02-11 NOTE — PROGRESS NOTES
Discharge instructions reviewed with patient and family, denies any questions or concerns. IV and telemetry removed. Coupons for eliquis given. Labs were drawn prior to d/c per h/o order. Pt ambulatory at d/c with documented belongings. Aware of need for f/u appts.  Electronically signed by Bethany Monk RN on 2/11/2022 at 6:35 PM

## 2022-02-11 NOTE — PROGRESS NOTES
4 Eyes Skin Assessment     NAME:  Danisha Sorto  YOB: 1975  MEDICAL RECORD NUMBER:  [id number]    The patient is being assess for  Admission    I agree that 2 RN's have performed a thorough Head to Toe Skin Assessment on the patient. ALL assessment sites listed below have been assessed. Areas assessed by both nurses:    Head, Face, Ears, Shoulders, Back, Chest, Arms, Elbows, Hands, Sacrum. Buttock, Coccyx, Ischium and Legs. Feet and Heels        Does the Patient have a Wound?  No noted wound(s)       Damian Prevention initiated:  No   Wound Care Orders initiated:  No    Pressure Injury (Stage 3,4, Unstageable, DTI, NWPT, and Complex wounds) if present place consult order under [de-identified] No    New and Established Ostomies if present place consult order under : No      Nurse 1 eSignature: Electronically signed by Derrick Dunne RN on 2/11/22 at 8:10 AM EST    **SHARE this note so that the co-signing nurse is able to place an eSignature**    Nurse 2 eSignature: Electronically signed by Sandi Narayan RN on 2/11/22 at 9:06 AM EST

## 2022-02-11 NOTE — ED NOTES
Report called to [facility] [address] [place] [address]  02/11/22 6869
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
## 2022-02-11 NOTE — PROGRESS NOTES
Clinical Pharmacy Note  Heparin Dosing       Lab Results   Component Value Date    APTT 36.1 02/10/2022     Lab Results   Component Value Date    HGB 14.6 02/10/2022    HCT 42.7 02/10/2022     02/10/2022    INR 1.02 02/10/2022       Plan:  Initial bolus: 6800 units  Initial infusion rate: 1540 units/hr  Next aPTT: 0800 2/11/22    Pharmacy will continue to monitor and adjust based on aPTT results.   Rosales Richard, RinD

## 2022-02-11 NOTE — PROGRESS NOTES
D: PT. Arrived from ED, alert and oriented, denies pain, SPO@ 94% on room air, breathing easily.  Heparin infusing as ordered  A: Oriented to 4W, admission started

## 2022-02-11 NOTE — TELEPHONE ENCOUNTER
Dr. Jenny Goodman called and spoke with patient. Patient states that he is feeling much better.  Patient will get his sutures removed in physical therapy on Monday by Shmuel Rodriguez PT.

## 2022-02-13 LAB
ANTICARDIOLIPIN IGG ANTIBODY: <10 GPL
CARDIOLIPIN AB IGM: <10 MPL

## 2022-02-14 ENCOUNTER — TELEPHONE (OUTPATIENT)
Dept: FAMILY MEDICINE CLINIC | Age: 47
End: 2022-02-14

## 2022-02-14 ENCOUNTER — TELEPHONE (OUTPATIENT)
Dept: ENDOCRINOLOGY | Age: 47
End: 2022-02-14

## 2022-02-14 ENCOUNTER — HOSPITAL ENCOUNTER (OUTPATIENT)
Dept: PHYSICAL THERAPY | Age: 47
Setting detail: THERAPIES SERIES
Discharge: HOME | End: 2022-02-14
Payer: COMMERCIAL

## 2022-02-14 ENCOUNTER — CARE COORDINATION (OUTPATIENT)
Dept: CASE MANAGEMENT | Age: 47
End: 2022-02-14

## 2022-02-14 DIAGNOSIS — F41.9 ANXIETY: Primary | ICD-10-CM

## 2022-02-14 LAB
ANTICARDIOLIPIN IGA ANTIBODY: <10 APL
ANTICARDIOLIPIN IGG ANTIBODY: <10 GPL
ANTITHROMBIN ACTIVITY: 97 % (ref 76–128)
ANTITHROMBIN ANTIGEN: 82 % (ref 82–136)
BLOOD CULTURE, ROUTINE: NORMAL
CARDIOLIPIN AB IGM: <10 MPL
FACTOR V ACTIVITY: 105 % (ref 62–140)
FACTOR V LEIDEN: NEGATIVE
PROTEIN S ANTIGEN, TOTAL: 130 % (ref 84–134)
SPECIMEN: NORMAL

## 2022-02-14 PROCEDURE — 97530 THERAPEUTIC ACTIVITIES: CPT | Performed by: PHYSICAL THERAPIST

## 2022-02-14 PROCEDURE — 97110 THERAPEUTIC EXERCISES: CPT | Performed by: PHYSICAL THERAPIST

## 2022-02-14 PROCEDURE — 97112 NEUROMUSCULAR REEDUCATION: CPT | Performed by: PHYSICAL THERAPIST

## 2022-02-14 ASSESSMENT — ENCOUNTER SYMPTOMS
WHEEZING: 0
SHORTNESS OF BREATH: 0
COUGH: 1

## 2022-02-14 NOTE — FLOWSHEET NOTE
[address] [facility] a Department of [address]  Faiza Omar [address], [address]  Office: [phone]  Fax:  [phone]        Physical Therapy Treatment Note/ Progress Report:           Date:  2022    Patient Name:  Jenny Zuñiga    :    MRN: [id number]  Restrictions/Precautions:    Medical/Treatment Diagnosis Information:  · Diagnosis: s/p left shoulder biceps tenodesis. Surgery on 22;  M25.512- left shoulder pain  · Treatment Diagnosis: M25.512- left shoulder pain  Insurance/Certification information:  PT Insurance Information: Erika Arroyo  Physician Information:  Referring Practitioner: Dipti Arauz  Has the plan of care been signed (Y/N):        []  Yes  [x]  No     Date of Patient follow up with Physician: today then Friday      Is this a Progress Report:     []  Yes  [x]  No        If Yes:  Date Range for reporting period:  Beginning 2022  Ending    Progress report will be due (10 Rx or 30 days whichever is less): 8 Mar 2022          Visit # Insurance Allowable Auth Required   2 auto []  Yes [x]  No        Functional Scale: UEFI-8.75%   Date assessed: 2022    Pain level: 5/10    SUBJECTIVE:  Pt reported that he had some right flank pain. He thought it was from sleeping funny. After he left here last visit, he started to develop a fever of around 99. The next day he had a fever of over 100. It was on and off. Then on Thursday he tried to get ready to go to work, but he coughed up blood. He called his PCP, who referred him to the ED. He actually went to work for about half day. When he came home for lunch, he decided he needed to go to the ED. He had a thorough work up including bilateral LE dopplers. He was on a Heparin drip until cleared. He has started Eloquis.   He does have an appointment with his PCP and hematologist.  He was to have a testosterone injection, but they discussed holding this due to the interactions between eloquis and testosterone. OBJECTIVE: 2/14/22   Observation:  PT removed sutures. I replaced steristrips. I did place a Tegaderm on axillary incision.  Test measurements:      ROM PROM AROM  Comment    L R L R    Flexion 122 pulleys       Abduction 150 scaption       ER 55 at 45       IR        Other        Other             Strength L R Comment   Flexion      Abduction      ER      IR      Supraspinatus      Upper Trap      Lower Trap      Mid Trap      Rhomboids      Biceps      Triceps      Horizontal Abduction      Horizontal Adduction      Lats          RESTRICTIONS/PRECAUTIONS: full motion of shoulder. NWB. 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 Exercises/Interventions:   Exercise/Equipment Resistance/Repetitions Other comments   Aerobic Conditioning     Aerodyne          Stretching/PROM     Wand     Table Slides 10x:10 Flex/scap   Wall slides      UE [place] 10x:10 ER at neutral and 45   Pulleys 10x:10  Flex/scap   Pendulum     BB IR NV    SL IR     Pec doorway stretch     CBA stretch     UT stretch     LS stretch     Isometrics     Retraction 10x:10     3' orange    Weight shift     Flexion     Abduction     External Rotation     Internal Rotation     Biceps     Triceps          PRE's     Flexion     Abduction     External Rotation     Internal Rotation     Shrugs 3x10    EXT     Reverse Flys     Serratus 3x10    Horizontal Abd with ER     Biceps PROM 10x:10    Triceps     Retraction          Cable Column/Theraband     External Rotation     Internal Rotation     Shrugs     Lats     Ext     Flex     Scapular Retraction     BIC     TRIC     PNF          Dynamic Stability          Plyoback            Access Code: Mi Bob  URL: [email or web address]  Date: 02/08/2022  Prepared by: Kush Stuart    Exercises  Circular Shoulder Pendulum with Table Support - 2 x daily - 7 x weekly - 10 sets - 10 hold  Seated Gripping Towel - 2 x daily - 7 x weekly - 3 reps - 1 sets - 2-3 hold  Seated Elbow Flexion AAROM - 2 x daily - 7 x weekly - 10 sets - 10 hold  Seated Scapular Retraction - 2 x daily - 7 x weekly - 10 reps - 10 hold  Seated Shoulder Shrugs - 2 x daily - 7 x weekly - 10 reps - 3 sets  Seated Shoulder Flexion Towel Slide at Table Top - 2 x daily - 7 x weekly - 10 reps - 10 hold  Seated Shoulder Scaption Slide at Table Top with Forearm in Neutral - 2 x daily - 7 x weekly - 10 reps - 10 hold  Seated Shoulder External Rotation AAROM with Cane and Hand in Neutral - 2 x daily - 7 x weekly - 10 reps - 10 hold    Therapeutic Exercise and NMR EXR  [x] (56072) Provided verbal/tactile cueing for activities related to strengthening, flexibility, endurance, ROM  for improvements in scapular, scapulothoracic and UE control with self care, reaching, carrying, lifting, house/yardwork, driving/computer work. [x] (41298) Provided verbal/tactile cueing for activities related to improving balance, coordination, kinesthetic sense, posture, motor skill, proprioception  to assist with  scapular, scapulothoracic and UE control with self care, reaching, carrying, lifting, house/yardwork, driving/computer work. Therapeutic Activities:    [x] (08531 or 13933) Provided verbal/tactile cueing for activities related to improving balance, coordination, kinesthetic sense, posture, motor skill, proprioception and motor activation to allow for proper function of scapular, scapulothoracic and UE control with self care, carrying, lifting, driving/computer work.      Home Exercise Program:    [x] (66530) Reviewed/Progressed HEP activities related to strengthening, flexibility, endurance, ROM of scapular, scapulothoracic and UE control with self care, reaching, carrying, lifting, house/yardwork, driving/computer work  [x] (13203) Reviewed/Progressed HEP activities related to improving balance, coordination, kinesthetic sense, posture, motor skill, proprioception of scapular, scapulothoracic and UE control with self care, reaching, carrying, lifting, house/yardwork, driving/computer work      Manual Treatments:  PROM / STM / Oscillations-Mobs:  G-I, II, III, IV (PA's, Inf., Post.)  [x] (27919) Provided manual therapy to mobilize soft tissue/joints of cervical/CT, scapular GHJ and UE for the purpose of modulating pain, promoting relaxation,  increasing ROM, reducing/eliminating soft tissue swelling/inflammation/restriction, improving soft tissue extensibility and allowing for proper ROM for normal function with self care, reaching, carrying, lifting, house/yardwork, driving/computer work    Pt Education: Patient education on PT and plan of care including diagnosis, prognosis, treatment goals and options. Patient agrees with discussed POC and treatment and is aware of rehab process. Pt was also educated on clinic layout and use of modalities. PT gave pt business card to call if any questions. Pt was ed on sling use, wound care, self care, dressing, showering, donning/doffing sling, and protocol. Pt was ed on S&S of infection and what to do if these are experienced. Modalities: declined    Charges:  Timed Code Treatment Minutes: 39'   Total Treatment Minutes: 48'     [] EVAL (LOW) 455 1011   [] EVAL (MOD) 81229   [] EVAL (HIGH) 93132   [] RE-EVAL   [x] TE(09003) x  1   [] IONTO  [x] NMR (74852) x  1   [] VASO  [] Manual (44167) x      [] Other:  [x] TA x  1    [] Mech Traction (61897)  [] ES(attended) (97137)      [] ES (un) (74590):     GOALS:   Patient stated goal: return to normal activity    []? Progressing: []? Met: []? Not Met: []? Adjusted     Therapist goals for Patient:   Short Term Goals: To be achieved in: 2 weeks  1. Independent in HEP and progression per patient tolerance, in order to prevent re-injury. []? Progressing: []? Met: []? Not Met: []? Adjusted   2. Patient will report pain at worst less than or equal to 4/10. []? Progressing: []? Met: []? Not Met: []? Adjusted     Long Term Goals:  To be achieved in: 16 weeks  1. Pt will demo UEFI of 90% or better to assist with reaching prior level of function. []? Progressing: []? Met: []? Not Met: []? Adjusted  2. Patient will demonstrate increased AROM to shoulder flex greater than or equal to 170, ABD greater than or equal to 170, ER greater than or equal to 80, IR greater than or equal to 50 to allow for proper joint functioning as indicated by patients Functional Deficits. []? Progressing: []? Met: []? Not Met: []? Adjusted  3. Patient will demonstrate an increase in strength to shoulder flex greater than or equal to 4+, ABD greater than or equal to 4+, ER greater than or equal to 4, IR greater than or equal to 4+ to allow for proper functional mobility as indicated by patients functional deficits. []? Progressing: []? Met: []? Not Met: []? Adjusted  4. Patient will return to performing all self care without increased symptoms or restriction. []? Progressing: []? Met: []? Not Met: []? Adjusted  5. Pt will report pain at worst less than or equal to 2/10. []? Progressing: []? Met: []? Not Met: []? Adjusted  6. Pt will return to his normal workout routine. []? Progressing: []? Met: []? Not Met: []? Adjusted           Overall Progression Towards Functional goals/ Treatment Progress Update:  [] Patient is progressing as expected towards functional goals listed. [] Progression is slowed due to complexities/Impairments listed. [] Progression has been slowed due to co-morbidities.   [x] Plan just implemented, too soon to assess goals progression <30days   [] Goals require adjustment due to lack of progress  [] Patient is not progressing as expected and requires additional follow up with physician  [] Other    Prognosis for POC: [x] Good [] Fair  [] Poor      Patient requires continued skilled intervention: [x] Yes  [] No    Treatment/Activity Tolerance:  [x] Patient able to complete treatment  [] Patient limited by fatigue  [] Patient limited by pain [] Patient limited by other medical complications  [] Other: Pt grace tx well. Unfortunately, he did develop a PE after his surgery. He presents today with no limitations regarding PT. 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 He does have appointments set for f/u regarding his clot and with his PCP. He will speak to them regarding testosterone and his current medical situation. Pt's incisions were removed. He will remove tegaderm if needed or in a couple days. Pt is progressing appropriately particularly considering his recent hospitalization. Pt would continue to benefit from skilled PT to improve strength, ROM, and function. PLAN: If pt doesn't return, this note can be considered a D/C note. Update HEP NV.    [x] Continue per plan of care [] Alter current plan (see comments above)  [] Plan of care initiated [] Hold pending MD visit [] Discharge    Electronically signed by: Sherlene Mohs, PT PT, DPT [id number]

## 2022-02-14 NOTE — TELEPHONE ENCOUNTER
----- Message from Barbie Duarte LPN sent at 2/55/0214  8:35 AM EST -----  Subject: Hospital Follow Up    QUESTIONS  What hospital was the Patient Discharged from? blood clot lung  Date of Discharge? 2022-02-11  Discharge Location? Home  Reason for hospitalization as patient stated? patient was inpatient for   blood clot in lung would like to schedule a follow up patient would like   to follow up tomorrow if possible he already has a 2pm tomorrow   What question does the patient have, if applicable?   ---------------------------------------------------------------------------  --------------  CALL BACK INFO  What is the best way for the office to contact you? OK to leave message on   voicemail  Preferred Call Back Phone Number? [phone]  ---------------------------------------------------------------------------  --------------  SCRIPT ANSWERS  Relationship to Patient? Self  (Patient requests to see provider urgently. )? No  (Has the patient been discharged from the hospital within 2 business days   AND does not have a Telephone Encounter  Follow Up From [address] [place] [address]   documented in [address]?)? Yes  Do you have any questions for your primary care provider that need to be   answered prior to your appointment? (Use RN Triage if question pertains to   anything on the red flag list)? No  (Patient needs follow up visit after hospital discharge) Book first   available appointment within 7 days OF DISCHARGE, if no appt, proceed to   book the next available time slot within 14 days OF DISCHARGE AND Send   Message to Provider. [address] Follow Up appointment cannot be booked   beyond 14 Days and should result in a Message to Provider. ?  Yes

## 2022-02-14 NOTE — PROGRESS NOTES
Subjective:      Patient ID: Kiet Tello is a 40 y.o. male. [facility]     Hospital Follow Up / Pulmonary Embolus:  Patient presented to ER on 2-10-22 with a 4 day history of pain in the right side and cough with hemoptysis. He was found to have an acute PE of the right lower lung and was admitted. TTE was unremarkable. Venous doppler of the bilateral lower extremities was negative for DVT. Hematology was consulted and patient had labs drawn for hypercoagulable state (result pending). He will follow up with Dr. Jonah Dykes in 2 days. He was treated with IV heparin and discharged the next day on Eliquis 5 mg BID. He has stopped the Testosterone treatment since the hospital.         Hyperglycemia:  Patient had two elevated blood sugars recently during a hospitalization. He now needs to have a HgbA1C. Review of Systems   Constitutional: Negative for chills and fever. Respiratory: Positive for cough. Negative for shortness of breath and wheezing. Cardiovascular: Positive for chest pain. Negative for palpitations and leg swelling. /86   Ht 6' (1.829 m)   Wt 189 lb (85.7 kg)   BMI 25.63 kg/m²    Objective:   Physical Exam  Vitals reviewed. Constitutional:       General: He is not in acute distress. Appearance: He is well-developed. HENT:      Head: Normocephalic. Right Ear: External ear normal.      Left Ear: External ear normal.   Neck:      Thyroid: No thyromegaly. Vascular: No carotid bruit or JVD. Cardiovascular:      Rate and Rhythm: Normal rate and regular rhythm. Heart sounds: Normal heart sounds. No murmur heard. Pulmonary:      Effort: Pulmonary effort is normal.      Breath sounds: Normal breath sounds. No wheezing or rales. Lymphadenopathy:      Cervical: No cervical adenopathy. Neurological:      Mental Status: He is alert and oriented to person, place, and time.          Assessment:      Right Pulmonary Embolus   Hyperglycemia       Plan: HgbA1C, Chem 7, Lipid Panel, Hepatitis C  Continue Eliquis   Follow up with Hematology on labs and recommendations regarding Eliquis in 2 days.     Flu Shot Declined   I recommended the COVID booster   Reminded patient to have a Colonoscopy  RTO 6 months for Pulmonary Embolus          KITTY MILLER, DO

## 2022-02-14 NOTE — CARE COORDINATION
[facility] Transitions Initial Follow Up Call    Call within 2 business days of discharge: Yes    Patient: Kennieth Crigler Patient :    MRN: [id number]  Reason for Admission: Cough, Flank Pain  Discharge Date: 22 RARS: Readmission Risk Score: 5.4 ( )      Last Discharge [facility]       Complaint Diagnosis Description Type Department Provider    2/10/22 Cough; Flank Pain Other acute pulmonary embolism without acute cor pulmonale ([facility] [address] . .. ED to Hosp-Admission (Discharged) (ADMITTED) Facundo Nettles MD; Corey Kent. .. Spoke with: [address]:     Non-face-to-face services provided:  Obtained and reviewed discharge summary and/or continuity of care documents   Transitions of Care Initial Call    Was this an external facility discharge? No Discharge Facility:     Challenges to be reviewed by the provider   Additional needs identified to be addressed with provider: No  none             Method of communication with provider : none      Advance Care Planning:   Does patient have an Advance Directive: Not on file  Was this a readmission? No  Patient stated reason for admission: Cough, Flank Pain  Patients top risk factors for readmission: ineffective coping    Care Transition Nurse (CTN) contacted the patient by telephone to perform post hospital discharge assessment. Verified name and  with patient as identifiers. Provided introduction to self, and explanation of the CTN role. CTN reviewed discharge instructions, medical action plan and red flags with patient who verbalized understanding. Patient given an opportunity to ask questions and does not have any further questions or concerns at this time. Were discharge instructions available to patient? Yes. Reviewed appropriate site of care based on symptoms and resources available to patient including: PCP, Specialist, Urgent care clinics, When to call [employer] and [employer].  The patient agrees to contact the PCP office for questions related to their healthcare. Medication reconciliation was performed with patient, who verbalizes understanding of administration of home medications. Advised obtaining a 90-day supply of all daily and as-needed medications. Covid Risk Education     Educated patient about risk for severe COVID-19 due to risk factors according to CDC guidelines. LPN CC reviewed discharge instructions, medical action plan and red flag symptoms with the patient who verbalized understanding. Discussed COVID vaccination status: Yes. Education provided on COVID-19 vaccination as appropriate. Discussed exposure protocols and quarantine with CDC Guidelines. Patient was given an opportunity to verbalize any questions and concerns and agrees to contact LPN CC or health care provider for questions related to their healthcare. Reviewed and educated patient on any new and changed medications related to discharge diagnosis. Was patient discharged with a pulse oximeter? No Discussed and confirmed pulse oximeter discharge instructions and when to notify provider or seek emergency care. LPN CC provided contact information. Plan for follow-up call in 5-7 days based on severity of symptoms and risk factors. Plan for next call: follow up appointment-How did they go? This [facility] spoke with pt and pt stated that he is doing well. Patient denied any worsening symptoms. Denied fever, chills, N/V and any difficulty breathing at this time. Denied chest pain and SOB. Incision is CDI. Medication review performed and patient verbalized understanding and is taking all medications as prescribed. Denied difficulty with urination, BMs or appetite. Pt has f/u with PCP on 02/16/22 and Surgeon on 02/21/22 . Denied any acute needs and concerns at this time. Advised pt to immediately report any worsening symptoms to the PCP. Patient verbalized understanding and agreed.   Hannah Newell LPN, [facility]  PH: [phone]            Care Transitions 24 Hour Call    Schedule Follow Up Appointment with PCP: Completed  Do you have any ongoing symptoms?: No  Do you have a copy of your discharge instructions?: Yes  Do you have all of your prescriptions and are they filled?: Yes  Have you been contacted by a [address] [place] [address] Avenue?: No  Have you scheduled your follow up appointment?: Yes  How are you going to get to your appointment?: Car - family or friend to transport  Were you discharged with any Home Care or Post Acute Services: No  Do you feel like you have everything you need to keep you well at home?: Yes  Care Transitions Interventions  No Identified Needs         Follow Up  Future Appointments   Date Time Provider Christian Beebe   2/15/2022 11:00 AM SCHEDULE, [facility]   2/16/2022  9:45 AM Anika Sauceda PT [facility]   2/16/2022  1:00 PM Cleatus Snellen, DO [address]   2/21/2022 10:15 AM SCHEDULE, [facility]   2/23/2022  9:00 AM Anika Sauceda PT [facility]   3/3/2022 10:20 AM ROCIO Dhillon - CNP [facility]   4/13/2022 10:45 AM Valery Zhang MD [facility]       Cornell Mai LPN

## 2022-02-14 NOTE — TELEPHONE ENCOUNTER
Spoke to patient. Advised to hold testosterone. Would need to avoid testosterone replacement. Recheck level in the future.

## 2022-02-15 LAB
CULTURE, BLOOD 2: NORMAL
PROTEIN C ANTIGEN: 93 % (ref 63–153)
PROTEIN C FUNCTIONAL: 135 % (ref 83–168)
PROTEIN S, FUNCTIONAL: 113 % (ref 66–143)
PROTHROMBIN G20210A MUTATION: NEGATIVE
PT PCR SPECIMEN: NORMAL
REASON FOR REJECTION: NORMAL
REJECTED TEST: NORMAL

## 2022-02-16 ENCOUNTER — HOSPITAL ENCOUNTER (OUTPATIENT)
Dept: PHYSICAL THERAPY | Age: 47
Setting detail: THERAPIES SERIES
Discharge: HOME | End: 2022-02-16
Payer: COMMERCIAL

## 2022-02-16 ENCOUNTER — OFFICE VISIT (OUTPATIENT)
Dept: FAMILY MEDICINE CLINIC | Age: 47
End: 2022-02-16
Payer: COMMERCIAL

## 2022-02-16 VITALS
HEIGHT: 72 IN | BODY MASS INDEX: 25.6 KG/M2 | SYSTOLIC BLOOD PRESSURE: 120 MMHG | WEIGHT: 189 LBS | DIASTOLIC BLOOD PRESSURE: 86 MMHG

## 2022-02-16 DIAGNOSIS — Z00.00 PREVENTATIVE HEALTH CARE: ICD-10-CM

## 2022-02-16 DIAGNOSIS — R73.9 HYPERGLYCEMIA: ICD-10-CM

## 2022-02-16 DIAGNOSIS — I26.99 RIGHT PULMONARY EMBOLUS (HCC): Primary | ICD-10-CM

## 2022-02-16 DIAGNOSIS — Z11.59 NEED FOR HEPATITIS C SCREENING TEST: ICD-10-CM

## 2022-02-16 DIAGNOSIS — Z23 NEED FOR INFLUENZA VACCINATION: ICD-10-CM

## 2022-02-16 LAB — HEPATITIS C ANTIBODY INTERPRETATION: NORMAL

## 2022-02-16 PROCEDURE — 99214 OFFICE O/P EST MOD 30 MIN: CPT | Performed by: FAMILY MEDICINE

## 2022-02-16 PROCEDURE — 97110 THERAPEUTIC EXERCISES: CPT

## 2022-02-16 PROCEDURE — 97530 THERAPEUTIC ACTIVITIES: CPT

## 2022-02-16 PROCEDURE — 97112 NEUROMUSCULAR REEDUCATION: CPT

## 2022-02-16 NOTE — FLOWSHEET NOTE
100 [facility] a Department of [address], [address]  Office: [phone]  Fax:  [phone]        Physical Therapy Treatment Note/ Progress Report:           Date:  2022    Patient Name:  Naty Marcano    :    MRN: [id number]  Restrictions/Precautions:    Medical/Treatment Diagnosis Information:  Diagnosis: s/p left shoulder biceps tenodesis. Surgery on 22;  M25.512- left shoulder pain  Treatment Diagnosis: M25.512- left shoulder pain  Insurance/Certification information:  PT Insurance Information: Nitish Mcintyre 150  Physician Information:  Referring Practitioner: Paras Antunez  Has the plan of care been signed (Y/N):        []  Yes  [x]  No     Date of Patient follow up with Physician: today then Friday      Is this a Progress Report:     []  Yes  [x]  No        If Yes:  Date Range for reporting period:  Beginning 2022  Ending    Progress report will be due (10 Rx or 30 days whichever is less): 8 Mar 2022          Visit # Insurance Allowable Auth Required   3 auto []  Yes [x]  No        Functional Scale: UEFI-8.75%   Date assessed: 2022    Pain level: 2/10    SUBJECTIVE:  Pt reports L shoulder \"doing pretty good today\". Pt states that L UE movements need to be slow and controlled to avoid increases in pain. Reports tightness and restriction in lateral shoulder from L AC joint into deltoid muscle that is reported as \"muscle ache\" that remains fairly consistent throughout the day, however, activity is not limited d/t symptom. OBJECTIVE: 22  Observation:  PT removed sutures. I replaced steristrips. I did place a Tegaderm on axillary incision.    Test measurements:      ROM PROM AROM  Comment    L R L R    Flexion 122 pulleys       Abduction 150 scaption       ER 55 at 45       IR        Other        Other             Strength L R Comment   Flexion      Abduction      ER      IR Supraspinatus      Upper Trap      Lower Trap      Mid Trap      Rhomboids      Biceps      Triceps      Horizontal Abduction      Horizontal Adduction      Lats          RESTRICTIONS/PRECAUTIONS: full motion of shoulder. NWB. 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 Exercises/Interventions:   Exercise/Equipment Resistance/Repetitions Other comments   Aerobic Conditioning     Aerodyne          Stretching/PROM     Supine Wand Flexion  10x:10     Table Slides 10x:10 Flex/scap   Wall slides      UE [place] 10x:10 ER at neutral and 45   Pulleys 10x:10  Flex/scap   Pendulum     BB IR 10x:10 Added 2/16-Strap Stretch    SL IR     Pec doorway stretch     CBA stretch     UT stretch     LS stretch     Isometrics     Retraction 10x:10     3' orange    Weight shift     Flexion NV? Abduction NV? External Rotation NV? Internal Rotation NV? Biceps     Triceps          PRE's     Flexion     Abduction     External Rotation     Internal Rotation     Shrugs 3x10    EXT     Reverse Flys     Serratus 3x10    Horizontal Abd with ER     Biceps PROM 10x:10    Triceps     Retraction          Cable Column/Theraband     External Rotation     Internal Rotation     Shrugs     Lats     Ext     Flex     Scapular Retraction     BIC     TRIC     PNF          Dynamic Stability          Plyoback            Access Code: Evon Anderson  URL: [email or web address]  Date: 02/08/2022  Prepared by: Angelita Stuart    Exercises  Seated Gripping Towel - 2 x daily - 7 x weekly - 3 reps - 1 sets - 2-3 hold  Seated Elbow Flexion AAROM - 2 x daily - 7 x weekly - 10 sets - 10 hold  Seated Scapular Retraction - 2 x daily - 7 x weekly - 10 reps - 10 hold  Seated Shoulder Shrugs - 2 x daily - 7 x weekly - 10 reps - 3 sets  Seated Shoulder Flexion Towel Slide at Table Top - 2 x daily - 7 x weekly - 10 reps - 10 hold  Seated Shoulder Scaption Slide at Table Top with Forearm in Neutral - 2 x daily - 7 x weekly - 10 reps - 10 hold  Seated Shoulder External Rotation AAROM with Cane and Hand in Neutral - 2 x daily - 7 x weekly - 10 reps - 10 hold    2/16/22: Ana Laura Jessy, PTA   Supine Shoulder External Rotation in 45 Degrees Abduction AAROM with Dowel - 2 x daily - 7 x weekly - 10 reps - 10 hold  Supine Shoulder Flexion Extension AAROM with Dowel - 2 x daily - 7 x weekly - 10 reps - 10 hold  Standing Shoulder Internal Rotation Stretch with Towel - 2 x daily - 7 x weekly - 10 reps - 10 hold  Supine Scapular Protraction in Flexion with Dumbbells - 1 x daily - 7 x weekly - 3 sets - 10 reps        Therapeutic Exercise and NMR EXR  [x] (83659) Provided verbal/tactile cueing for activities related to strengthening, flexibility, endurance, ROM  for improvements in scapular, scapulothoracic and UE control with self care, reaching, carrying, lifting, house/yardwork, driving/computer work. [x] (88288) Provided verbal/tactile cueing for activities related to improving balance, coordination, kinesthetic sense, posture, motor skill, proprioception  to assist with  scapular, scapulothoracic and UE control with self care, reaching, carrying, lifting, house/yardwork, driving/computer work. Therapeutic Activities:    [x] (72278 or 08729) Provided verbal/tactile cueing for activities related to improving balance, coordination, kinesthetic sense, posture, motor skill, proprioception and motor activation to allow for proper function of scapular, scapulothoracic and UE control with self care, carrying, lifting, driving/computer work.      Home Exercise Program:    [x] (20779) Reviewed/Progressed HEP activities related to strengthening, flexibility, endurance, ROM of scapular, scapulothoracic and UE control with self care, reaching, carrying, lifting, house/yardwork, driving/computer work  [x] (20060) Reviewed/Progressed HEP activities related to improving balance, coordination, kinesthetic sense, posture, motor skill, proprioception of scapular, scapulothoracic and UE control with self care, reaching, carrying, lifting, house/yardwork, driving/computer work      Manual Treatments:  PROM / STM / Oscillations-Mobs:  G-I, II, III, IV (PA's, Inf., Post.)  [x] (66769) Provided manual therapy to mobilize soft tissue/joints of cervical/CT, scapular GHJ and UE for the purpose of modulating pain, promoting relaxation,  increasing ROM, reducing/eliminating soft tissue swelling/inflammation/restriction, improving soft tissue extensibility and allowing for proper ROM for normal function with self care, reaching, carrying, lifting, house/yardwork, driving/computer work    Pt Education: Patient education on PT and plan of care including diagnosis, prognosis, treatment goals and options. Patient agrees with discussed POC and treatment and is aware of rehab process. Pt was also educated on clinic layout and use of modalities. PT gave pt business card to call if any questions. Pt was ed on sling use, wound care, self care, dressing, showering, donning/doffing sling, and protocol. Pt was ed on S&S of infection and what to do if these are experienced. Modalities: declined    Charges:   Timed Code Treatment Minutes: 39'   Total Treatment Minutes: 54'     [] EVAL (LOW) 455 1011   [] EVAL (MOD) 57136   [] EVAL (HIGH) 40867   [] RE-EVAL   [x] UM(70729) x  1   [] IONTO  [x] NMR (54970) x  1   [] VASO  [] Manual (74025) x      [] Other:  [x] TA x  1    [] Mech Traction (15793)  [] ES(attended) (15550)      [] ES (un) (91666):     GOALS:   Patient stated goal: return to normal activity    [] Progressing: [] Met: [] Not Met: [] Adjusted     Therapist goals for Patient:   Short Term Goals: To be achieved in: 2 weeks  1. Independent in HEP and progression per patient tolerance, in order to prevent re-injury. [] Progressing: [] Met: [] Not Met: [] Adjusted   2. Patient will report pain at worst less than or equal to 4/10. [] Progressing: [] Met: [] Not Met: [] Adjusted     Long Term Goals:  To be achieved in: 16 weeks  1. Pt will demo UEFI of 90% or better to assist with reaching prior level of function. [] Progressing: [] Met: [] Not Met: [] Adjusted  2. Patient will demonstrate increased AROM to shoulder flex greater than or equal to 170, ABD greater than or equal to 170, ER greater than or equal to 80, IR greater than or equal to 50 to allow for proper joint functioning as indicated by patients Functional Deficits. [] Progressing: [] Met: [] Not Met: [] Adjusted  3. Patient will demonstrate an increase in strength to shoulder flex greater than or equal to 4+, ABD greater than or equal to 4+, ER greater than or equal to 4, IR greater than or equal to 4+ to allow for proper functional mobility as indicated by patients functional deficits. [] Progressing: [] Met: [] Not Met: [] Adjusted  4. Patient will return to performing all self care without increased symptoms or restriction. [] Progressing: [] Met: [] Not Met: [] Adjusted  5. Pt will report pain at worst less than or equal to 2/10. [] Progressing: [] Met: [] Not Met: [] Adjusted  6. Pt will return to his normal workout routine. [] Progressing: [] Met: [] Not Met: [] Adjusted           Overall Progression Towards Functional goals/ Treatment Progress Update:  [] Patient is progressing as expected towards functional goals listed. [] Progression is slowed due to complexities/Impairments listed. [] Progression has been slowed due to co-morbidities.   [x] Plan just implemented, too soon to assess goals progression <30days   [] Goals require adjustment due to lack of progress  [] Patient is not progressing as expected and requires additional follow up with physician  [] Other    Prognosis for POC: [x] Good [] Fair  [] Poor      Patient requires continued skilled intervention: [x] Yes  [] No    Treatment/Activity Tolerance:  [x] Patient able to complete treatment  [] Patient limited by fatigue  [] Patient limited by pain     [] Patient limited by other medical complications  [] Other: Pt grace tx well. HEP updated this date with new H.O. provided for new activities as pt demo's appropriate postures and mechanics with exercises in clinic. Cueing provided to maintain stretch intensity with pulley intervention with good understanding and carry-over displayed by pt. Pt demo's gradual improvements during bicep PROM, throughout intervention, and pt reports equal stretch intensity through entire muscle belly without increase in pain. In supine resting, pt displays mild ant HH translation, when compared bilaterally, d/t flexibility deficits  present in bicep and pec musculature at this date. Pt would continue to benefit from skilled PT to improve strength, ROM, and function. PLAN: If pt doesn't return, this note can be considered a D/C note. Plan to initiate gentle shoulder Iso's at [facility].    [x] Continue per plan of care [] Alter current plan (see comments above)  [] Plan of care initiated [] Hold pending MD visit [] Discharge    Electronically signed by: Anca Garay, PT PT, DPT [id number]   Treatment Assisted by: Claire Pena, PTA [id number]

## 2022-02-17 PROBLEM — E78.00 HYPERCHOLESTEROLEMIA: Status: ACTIVE | Noted: 2022-02-17

## 2022-02-17 LAB
ANION GAP SERPL CALCULATED.3IONS-SCNC: 13 MMOL/L (ref 3–16)
BUN BLDV-MCNC: 11 MG/DL (ref 7–20)
CALCIUM SERPL-MCNC: 9.1 MG/DL (ref 8.3–10.6)
CHLORIDE BLD-SCNC: 102 MMOL/L (ref 99–110)
CHOLESTEROL, TOTAL: 221 MG/DL (ref 0–199)
CO2: 25 MMOL/L (ref 21–32)
CREAT SERPL-MCNC: 1 MG/DL (ref 0.9–1.3)
DRVVT CONFIRMATION TEST: NORMAL RATIO
DRVVT SCREEN: 34 SEC (ref 33–44)
DRVVT,DIL: NORMAL SEC (ref 33–44)
ESTIMATED AVERAGE GLUCOSE: 102.5 MG/DL
GFR AFRICAN AMERICAN: >60
GFR NON-AFRICAN AMERICAN: >60
GLUCOSE BLD-MCNC: 128 MG/DL (ref 70–99)
HBA1C MFR BLD: 5.2 %
HDLC SERPL-MCNC: 52 MG/DL (ref 40–60)
HEXAGONAL PHOSPHOLIPID NEUTRALIZAT TEST: NORMAL
LDL CHOLESTEROL CALCULATED: 144 MG/DL
LUPUS ANTICOAG INTERP: NORMAL
PLT NEUTA: NORMAL
POTASSIUM SERPL-SCNC: 4.2 MMOL/L (ref 3.5–5.1)
PROTEIN C FUNCTIONAL: 137 % (ref 83–168)
PROTEIN S, FUNCTIONAL: 123 % (ref 66–143)
PT D: 13.5 SEC (ref 12–15.5)
PTT D: 47 SEC (ref 32–48)
PTT-D CORR REFLEX: NORMAL SEC (ref 32–48)
PTT-HEPARIN NEUTRALIZED: NORMAL SEC (ref 32–48)
REPTILASE TIME: NORMAL SEC
SODIUM BLD-SCNC: 140 MMOL/L (ref 136–145)
THROMBIN TIME: NORMAL SEC (ref 14.7–19.5)
TRIGL SERPL-MCNC: 124 MG/DL (ref 0–150)
VLDLC SERPL CALC-MCNC: 25 MG/DL

## 2022-02-21 ENCOUNTER — CARE COORDINATION (OUTPATIENT)
Dept: CASE MANAGEMENT | Age: 47
End: 2022-02-21

## 2022-02-21 ENCOUNTER — HOSPITAL ENCOUNTER (OUTPATIENT)
Dept: PHYSICAL THERAPY | Age: 47
Setting detail: THERAPIES SERIES
Discharge: HOME | End: 2022-02-21
Payer: COMMERCIAL

## 2022-02-21 PROCEDURE — 97112 NEUROMUSCULAR REEDUCATION: CPT

## 2022-02-21 PROCEDURE — 97530 THERAPEUTIC ACTIVITIES: CPT

## 2022-02-21 PROCEDURE — 97110 THERAPEUTIC EXERCISES: CPT

## 2022-02-21 NOTE — CARE COORDINATION
[facility] Transitions Follow Up Call    2022    Patient: Claudia Ahumada  Patient :    MRN: [id number]  Reason for Admission: PE  Discharge Date: 22 RARS: Readmission Risk Score: 5.4 ( )         Spoke with: no one    Care Transitions Subsequent and Final Call    Subsequent and Final Calls  Care Transitions Interventions  Other Interventions: Follow Up  Future Appointments   Date Time Provider Christian Beebe   2022  9:00 AM Lilly Alfaro PT [facility]   2022 10:00 AM Nanette Alarcon PTA [facility]   3/2/2022  8:30 AM Nanette Alarcon, EDMOND [facility]   3/3/2022 10:20 AM ROCIO Jane - CNP [facility]   2022 10:45 AM Jacqualyn Evert Teretha Husky, MD [facility]       Follow up outreach call attempt, no answer. CTN left  with contact information and request for return call. CTN will continue with outreach call attempts.     ROBYN Browne, RN   Care Transition Nurse  Mobile: [phone]

## 2022-02-21 NOTE — FLOWSHEET NOTE
100 [facility] Performance [facility] a Department of [address]  [facility] [address] [place]  Office: [phone]  Fax:  [phone]        Physical Therapy Treatment Note/ Progress Report:         Date:  2022    Patient Name:  Franny Hernandez    :  [age]  MRN: [id number]  Restrictions/Precautions:    Medical/Treatment Diagnosis Information:  · Diagnosis: s/p left shoulder biceps tenodesis. Surgery on 22;  M25.512- left shoulder pain  · Treatment Diagnosis: M25.512- left shoulder pain  Insurance/Certification information:  PT Insurance Information: Nitish Mcintyre 150  Physician Information:  Referring Practitioner: Marion Hinson  Has the plan of care been signed (Y/N):        []  Yes  [x]  No     Date of Patient follow up with Physician: NTS      Is this a Progress Report:     []  Yes  [x]  No        If Yes:  Date Range for reporting period:  Beginning 2022  Ending    Progress report will be due (10 Rx or 30 days whichever is less): 8 Mar 2022          Visit # Insurance Allowable Auth Required   4 auto []  Yes [x]  No        Functional Scale: UEFI-8.75%   Date assessed: 2022    Pain level: 1/10    SUBJECTIVE:  Patient states his shoulder is feeling \"really good\" today. Pt reports he did not wear his sling much this weekend but that he was very cognizant with his activities. States some steri strips came off this weekend. Patient denies any recent bleeding from sutures. Patient had follow up with hematologist, reports everything came back \"good\" with the blood work. Will be on eloquis for 6 months. OBJECTIVE:   Observation:  PT removed sutures. I replaced steristrips. I did place a Tegaderm on axillary incision.  ( 22)   Test measurements:       22  ROM PROM AROM  Comment    L R L R    Flexion 142 pulleys       Abduction 154 scaption  pulleys       ER 55 at 45       IR        Other        Other             Strength L R Comment Flexion      Abduction      ER      IR      Supraspinatus      Upper Trap      Lower Trap      Mid Trap      Rhomboids      Biceps      Triceps      Horizontal Abduction      Horizontal Adduction      Lats          RESTRICTIONS/PRECAUTIONS: full motion of shoulder. NWB. 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 Exercises/Interventions:   Exercise/Equipment Resistance/Repetitions Other comments   Aerobic Conditioning     Aerodyne          Stretching/PROM     Supine Wand Flexion  10x:10     Table Slides 10x:10 Flex/scap   Wall slides      UE [place] 10x:10 ER at neutral and 45  In supine    Pulleys 10x:10  Flex/scap   Pendulum     BB IR 10x:10 Added 2/16-Strap Stretch    SL IR     Pec doorway stretch     CBA stretch     UT stretch     LS stretch     Isometrics     Retraction 10x:10     3' orange    Weight shift     Flexion 10x:05  Added 2/21   Abduction 10x:05  Added 2/21   External Rotation 10x:05  Added 2/21   Internal Rotation 10x:05  Added 2/21   Biceps     Triceps          PRE's     Flexion     Abduction     External Rotation     Internal Rotation     Shrugs 3x10    EXT     Reverse Flys     Serratus 3x10    Horizontal Abd with ER     Biceps PROM 10x:10    Triceps     Retraction          Cable Column/Theraband     External Rotation     Internal Rotation     Shrugs     Lats     Ext     Flex     Scapular Retraction     BIC     TRIC     PNF          Dynamic Stability          Plyoback            Access Code: Dmitry Phi  URL: [email or web address]  Date: 02/08/2022  Prepared by: Yamilet Baezna    Exercises  Seated Gripping Towel - 2 x daily - 7 x weekly - 3 reps - 1 sets - 2-3 hold  Seated Elbow Flexion AAROM - 2 x daily - 7 x weekly - 10 sets - 10 hold  Seated Scapular Retraction - 2 x daily - 7 x weekly - 10 reps - 10 hold  Seated Shoulder Shrugs - 2 x daily - 7 x weekly - 10 reps - 3 sets  Seated Shoulder Flexion Towel Slide at Table Top - 2 x daily - 7 x weekly - 10 reps - 10 hold  Seated Shoulder Scaption Slide at Table Top with Forearm in Neutral - 2 x daily - 7 x weekly - 10 reps - 10 hold  Seated Shoulder External Rotation AAROM with Cane and Hand in Neutral - 2 x daily - 7 x weekly - 10 reps - 10 hold    2/16/22: Keila Buttharriett, PTA   Supine Shoulder External Rotation in 45 Degrees Abduction AAROM with Dowel - 2 x daily - 7 x weekly - 10 reps - 10 hold  Supine Shoulder Flexion Extension AAROM with Dowel - 2 x daily - 7 x weekly - 10 reps - 10 hold  Standing Shoulder Internal Rotation Stretch with Towel - 2 x daily - 7 x weekly - 10 reps - 10 hold  Supine Scapular Protraction in Flexion with Dumbbells - 1 x daily - 7 x weekly - 3 sets - 10 reps        Therapeutic Exercise and NMR EXR  [x] (03860) Provided verbal/tactile cueing for activities related to strengthening, flexibility, endurance, ROM  for improvements in scapular, scapulothoracic and UE control with self care, reaching, carrying, lifting, house/yardwork, driving/computer work. [x] (08187) Provided verbal/tactile cueing for activities related to improving balance, coordination, kinesthetic sense, posture, motor skill, proprioception  to assist with  scapular, scapulothoracic and UE control with self care, reaching, carrying, lifting, house/yardwork, driving/computer work. Therapeutic Activities:    [x] (84960 or 11717) Provided verbal/tactile cueing for activities related to improving balance, coordination, kinesthetic sense, posture, motor skill, proprioception and motor activation to allow for proper function of scapular, scapulothoracic and UE control with self care, carrying, lifting, driving/computer work.      Home Exercise Program:    [x] (41857) Reviewed/Progressed HEP activities related to strengthening, flexibility, endurance, ROM of scapular, scapulothoracic and UE control with self care, reaching, carrying, lifting, house/yardwork, driving/computer work  [x] (86123) Reviewed/Progressed HEP activities related to improving balance, coordination, kinesthetic sense, posture, motor skill, proprioception of scapular, scapulothoracic and UE control with self care, reaching, carrying, lifting, house/yardwork, driving/computer work      Manual Treatments:  PROM / STM / Oscillations-Mobs:  G-I, II, III, IV (PA's, Inf., Post.)  [x] (88597) Provided manual therapy to mobilize soft tissue/joints of cervical/CT, scapular GHJ and UE for the purpose of modulating pain, promoting relaxation,  increasing ROM, reducing/eliminating soft tissue swelling/inflammation/restriction, improving soft tissue extensibility and allowing for proper ROM for normal function with self care, reaching, carrying, lifting, house/yardwork, driving/computer work    Pt Education: Patient education on PT and plan of care including diagnosis, prognosis, treatment goals and options. Patient agrees with discussed POC and treatment and is aware of rehab process. Pt was also educated on clinic layout and use of modalities. PT gave pt business card to call if any questions. Pt was ed on sling use, wound care, self care, dressing, showering, donning/doffing sling, and protocol. Pt was ed on S&S of infection and what to do if these are experienced. Modalities: declined    Charges:   Timed Code Treatment Minutes: 48'   Total Treatment Minutes: 53'   [id number]  [] EVAL (LOW) 455 1011   [] EVAL (MOD) 80741   [] EVAL (HIGH) 88722   [] RE-EVAL   [x] EN(52422) x  2   [] IONTO  [x] NMR (14330) x  1   [] VASO  [] Manual (99312) x      [] Other:  [x] TA x  1    [] Mech Traction (13277)  [] ES(attended) (57721)      [] ES (un) (53409):     GOALS:   Patient stated goal: return to normal activity    [] Progressing: [] Met: [] Not Met: [] Adjusted     Therapist goals for Patient:   Short Term Goals: To be achieved in: 2 weeks  1. Independent in HEP and progression per patient tolerance, in order to prevent re-injury. [] Progressing: [] Met: [] Not Met: [] Adjusted   2.  Patient will report pain at worst less than or equal to 4/10. [] Progressing: [] Met: [] Not Met: [] Adjusted     Long Term Goals: To be achieved in: 16 weeks  1. Pt will demo UEFI of 90% or better to assist with reaching prior level of function. [] Progressing: [] Met: [] Not Met: [] Adjusted  2. Patient will demonstrate increased AROM to shoulder flex greater than or equal to 170, ABD greater than or equal to 170, ER greater than or equal to 80, IR greater than or equal to 50 to allow for proper joint functioning as indicated by patients Functional Deficits. [] Progressing: [] Met: [] Not Met: [] Adjusted  3. Patient will demonstrate an increase in strength to shoulder flex greater than or equal to 4+, ABD greater than or equal to 4+, ER greater than or equal to 4, IR greater than or equal to 4+ to allow for proper functional mobility as indicated by patients functional deficits. [] Progressing: [] Met: [] Not Met: [] Adjusted  4. Patient will return to performing all self care without increased symptoms or restriction. [] Progressing: [] Met: [] Not Met: [] Adjusted  5. Pt will report pain at worst less than or equal to 2/10. [] Progressing: [] Met: [] Not Met: [] Adjusted  6. Pt will return to his normal workout routine. [] Progressing: [] Met: [] Not Met: [] Adjusted           Overall Progression Towards Functional goals/ Treatment Progress Update:  [] Patient is progressing as expected towards functional goals listed. [] Progression is slowed due to complexities/Impairments listed. [] Progression has been slowed due to co-morbidities.   [x] Plan just implemented, too soon to assess goals progression <30days   [] Goals require adjustment due to lack of progress  [] Patient is not progressing as expected and requires additional follow up with physician  [] Other    Prognosis for POC: [x] Good [] Fair  [] Poor      Patient requires continued skilled intervention: [x] Yes  [] No    Treatment/Activity Tolerance:  [x]

## 2022-02-22 LAB
DRVVT CONFIRMATION TEST: ABNORMAL RATIO
DRVVT SCREEN: 35 SEC (ref 33–44)
DRVVT,DIL: ABNORMAL SEC (ref 33–44)
HEXAGONAL PHOSPHOLIPID NEUTRALIZAT TEST: POSITIVE
LUPUS ANTICOAG INTERP: ABNORMAL
PLT NEUTA: NEGATIVE
PT D: 13.4 SEC (ref 12–15.5)
PTT D: 56 SEC (ref 32–48)
PTT-D CORR REFLEX: 50 SEC (ref 32–48)
PTT-HEPARIN NEUTRALIZED: ABNORMAL SEC (ref 32–48)
REPTILASE TIME: ABNORMAL SEC
THROMBIN TIME: 15.8 SEC (ref 14.7–19.5)

## 2022-02-23 ENCOUNTER — HOSPITAL ENCOUNTER (OUTPATIENT)
Dept: PHYSICAL THERAPY | Age: 47
Setting detail: THERAPIES SERIES
Discharge: HOME | End: 2022-02-23
Payer: COMMERCIAL

## 2022-02-23 PROCEDURE — 97110 THERAPEUTIC EXERCISES: CPT | Performed by: PHYSICAL THERAPIST

## 2022-02-23 PROCEDURE — 97140 MANUAL THERAPY 1/> REGIONS: CPT | Performed by: PHYSICAL THERAPIST

## 2022-02-23 PROCEDURE — 97530 THERAPEUTIC ACTIVITIES: CPT | Performed by: PHYSICAL THERAPIST

## 2022-02-23 PROCEDURE — 97112 NEUROMUSCULAR REEDUCATION: CPT | Performed by: PHYSICAL THERAPIST

## 2022-02-23 NOTE — FLOWSHEET NOTE
100 [facility] a Department of [address]  Meghan Parison [address], [address]  Office: [phone]  Fax:  [phone]        Physical Therapy Treatment Note/ Progress Report:         Date:  2022    Patient Name:  Jeanine Dunlap    :  [birth year]  MRN: [id number]  Restrictions/Precautions:    Medical/Treatment Diagnosis Information:  · Diagnosis: s/p left shoulder biceps tenodesis. Surgery on 22;  M25.512- left shoulder pain  · Treatment Diagnosis: M25.512- left shoulder pain  Insurance/Certification information:  PT Insurance Information: Jolie Pace  Physician Information:  Referring Practitioner: Helena Richey  Has the plan of care been signed (Y/N):        []  Yes  [x]  No     Date of Patient follow up with Physician: 1 Mar 22      Is this a Progress Report:     []  Yes  [x]  No        If Yes:  Date Range for reporting period:  Beginning 2022  Ending    Progress report will be due (10 Rx or 30 days whichever is less): 8 Mar 2022          Visit # Insurance Allowable Auth Required   5 auto []  Yes [x]  No        Functional Scale: UEFI-8.75%   Date assessed: 2022    Pain level: 0/10    SUBJECTIVE:  Monday he worked from home, and he didn't really have the sling on. It didn't bother him. OBJECTIVE: 22    Observation:     Test measurements:         ROM PROM AROM  Comment    L R L R    Flexion 140 pulleys   173    Abduction 154 scaption  Pulleys. 180 manual.    193    ER 73 at 90 manual   102    IR    40    Other        Other             Strength L R Comment   Flexion      Abduction      ER      IR      Supraspinatus      Upper Trap      Lower Trap      Mid Trap      Rhomboids      Biceps      Triceps      Horizontal Abduction      Horizontal Adduction      Lats          RESTRICTIONS/PRECAUTIONS: full motion of shoulder. NWB. 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         Exercises/Interventions:   Exercise/Equipment Resistance/Repetitions Other comments   Aerobic Conditioning     Aerodyne          Stretching/PROM     Supine Wand  10x:10  Flex/scap   Table Slides 10x:10 Flex/scap- D/C NV? Wall slides  Walks NPV? UE [place] 10x:10 ER at neutral and 45  2/23-90 added   In supine    Pulleys 10x:10  Flex/scap   Pendulum     BB IR 10x:10 Added 2/16-Strap Stretch    SL IR          Pec doorway stretch     CBA stretch     UT stretch     LS stretch     Isometrics     Retraction 10x:10 Red Tband  Tband added 2/23        Weight shift     Flexion 10x:05  Added 2/21   Abduction 10x:05  Added 2/21   External Rotation 10x:05  Added 2/21   Internal Rotation 10x:05  Added 2/21   Biceps     Triceps          PRE's     Flexion     Abduction     External Rotation     Internal Rotation     Shrugs 3x10    EXT     Reverse Flys     Serratus 3x10 ER [place] Cane  2/23-Cane Added    Horizontal Abd with ER     Biceps PROM 10x:10    Triceps     Retraction          Cable Column/Theraband     External Rotation NV? Internal Rotation NV? Shrugs     Lats     Ext     Flex     Scapular Retraction NV? BIC     TRIC     PNF          Dynamic Stability          Plyoback          Manual Flex, ABD, IR, ER 10' with oscillations (grade I) Added 2/23               Access Code: Marvin Oakley  URL: [email or web address]  Date: 02/08/2022  Prepared by: Feliciano Stuart    Exercises  Seated Gripping Towel - 2 x daily - 7 x weekly - 3 reps - 1 sets - 2-3 hold  Seated Elbow Flexion AAROM - 2 x daily - 7 x weekly - 10 sets - 10 hold  Seated Scapular Retraction - 2 x daily - 7 x weekly - 10 reps - 10 hold  Seated Shoulder Shrugs - 2 x daily - 7 x weekly - 10 reps - 3 sets  Seated Shoulder Flexion Towel Slide at Table Top - 2 x daily - 7 x weekly - 10 reps - 10 hold  Seated Shoulder Scaption Slide at Table Top with Forearm in Neutral - 2 x daily - 7 x weekly - 10 reps - 10 hold  Seated Shoulder External Rotation AAROM with Cane and Hand in Neutral - 2 x daily - 7 x weekly - 10 reps - 10 hold    2/16/22: Deneise Couch, PTA   Supine Shoulder External Rotation in 45 Degrees Abduction AAROM with Dowel - 2 x daily - 7 x weekly - 10 reps - 10 hold  Supine Shoulder Flexion Extension AAROM with Dowel - 2 x daily - 7 x weekly - 10 reps - 10 hold  Standing Shoulder Internal Rotation Stretch with Towel - 2 x daily - 7 x weekly - 10 reps - 10 hold  Supine Scapular Protraction in Flexion with Dumbbells - 1 x daily - 7 x weekly - 3 sets - 10 reps        Therapeutic Exercise and NMR EXR  [x] (34182) Provided verbal/tactile cueing for activities related to strengthening, flexibility, endurance, ROM  for improvements in scapular, scapulothoracic and UE control with self care, reaching, carrying, lifting, house/yardwork, driving/computer work. [x] (45400) Provided verbal/tactile cueing for activities related to improving balance, coordination, kinesthetic sense, posture, motor skill, proprioception  to assist with  scapular, scapulothoracic and UE control with self care, reaching, carrying, lifting, house/yardwork, driving/computer work. Therapeutic Activities:    [x] (65391 or 47963) Provided verbal/tactile cueing for activities related to improving balance, coordination, kinesthetic sense, posture, motor skill, proprioception and motor activation to allow for proper function of scapular, scapulothoracic and UE control with self care, carrying, lifting, driving/computer work.      Home Exercise Program:    [x] (90988) Reviewed/Progressed HEP activities related to strengthening, flexibility, endurance, ROM of scapular, scapulothoracic and UE control with self care, reaching, carrying, lifting, house/yardwork, driving/computer work  [x] (10816) Reviewed/Progressed HEP activities related to improving balance, coordination, kinesthetic sense, posture, motor skill, proprioception of scapular, scapulothoracic and UE control with self care, reaching, carrying, lifting, house/yardwork, driving/computer work      Manual Treatments:  PROM / STM / Oscillations-Mobs:  G-I, II, III, IV (PA's, Inf., Post.)  [x] (56906) Provided manual therapy to mobilize soft tissue/joints of cervical/CT, scapular GHJ and UE for the purpose of modulating pain, promoting relaxation,  increasing ROM, reducing/eliminating soft tissue swelling/inflammation/restriction, improving soft tissue extensibility and allowing for proper ROM for normal function with self care, reaching, carrying, lifting, house/yardwork, driving/computer work    Pt Education: Patient education on PT and plan of care including diagnosis, prognosis, treatment goals and options. Patient agrees with discussed POC and treatment and is aware of rehab process. Pt was also educated on clinic layout and use of modalities. PT gave pt business card to call if any questions. Pt was ed on sling use, wound care, self care, dressing, showering, donning/doffing sling, and protocol. Pt was ed on S&S of infection and what to do if these are experienced. Modalities: declined    Charges:   Timed Code Treatment Minutes: 61'   Total Treatment Minutes: 61'     [] EVAL (LOW) 455 1011   [] EVAL (MOD) 61837   [] EVAL (HIGH) 59241   [] RE-EVAL   [x] MH(96732) x  1   [] IONTO  [x] NMR (21499) x  1   [] VASO  [x] Manual (16944) x 1     [] Other:  [x] TA x  1    [] Mech Traction (41905)  [] ES(attended) (39704)      [] ES (un) (11984):     GOALS:   Patient stated goal: return to normal activity    [] Progressing: [] Met: [] Not Met: [] Adjusted     Therapist goals for Patient:   Short Term Goals: To be achieved in: 2 weeks  1. Independent in HEP and progression per patient tolerance, in order to prevent re-injury. [] Progressing: [] Met: [] Not Met: [] Adjusted   2. Patient will report pain at worst less than or equal to 4/10. [] Progressing: [] Met: [] Not Met: [] Adjusted     Long Term Goals: To be achieved in: 16 weeks  1.  Pt will demo UEFI of 90% or better to assist with reaching prior level of function. [] Progressing: [] Met: [] Not Met: [] Adjusted  2. Patient will demonstrate increased AROM to shoulder flex greater than or equal to 170, ABD greater than or equal to 170, ER greater than or equal to 80, IR greater than or equal to 50 to allow for proper joint functioning as indicated by patients Functional Deficits. [] Progressing: [] Met: [] Not Met: [] Adjusted  3. Patient will demonstrate an increase in strength to shoulder flex greater than or equal to 4+, ABD greater than or equal to 4+, ER greater than or equal to 4, IR greater than or equal to 4+ to allow for proper functional mobility as indicated by patients functional deficits. [] Progressing: [] Met: [] Not Met: [] Adjusted  4. Patient will return to performing all self care without increased symptoms or restriction. [] Progressing: [] Met: [] Not Met: [] Adjusted  5. Pt will report pain at worst less than or equal to 2/10. [] Progressing: [] Met: [] Not Met: [] Adjusted  6. Pt will return to his normal workout routine. [] Progressing: [] Met: [] Not Met: [] Adjusted           Overall Progression Towards Functional goals/ Treatment Progress Update:  [] Patient is progressing as expected towards functional goals listed. [] Progression is slowed due to complexities/Impairments listed. [] Progression has been slowed due to co-morbidities.   [x] Plan just implemented, too soon to assess goals progression <30days   [] Goals require adjustment due to lack of progress  [] Patient is not progressing as expected and requires additional follow up with physician  [] Other    Prognosis for POC: [x] Good [] Fair  [] Poor      Patient requires continued skilled intervention: [x] Yes  [] No    Treatment/Activity Tolerance:  [x] Patient able to complete treatment  [] Patient limited by fatigue  [] Patient limited by pain     [] Patient limited by other medical complications  [] Other: Patient tolerated treatment well. Pt instructed to increase intensity with L shoulder ISOs this date with appropriate mechanics maintained and no c/o increased symptoms reported by pt. Pt instructed to progress HEP stretches with 90/90 ER and OH flexion with cane and pt able to reciprocate instructions to clinician. Plan to progress with AROM strengthening NPV with wall slide activity to add for HEP to progress L UE strength, endurance, ROM and function. PLAN: If pt doesn't return, this note can be considered a D/C note. Consider progressing isometrics NV and adding wall slides. Take ROM.     [x] Continue per plan of care [] Alter current plan (see comments above)  [] Plan of care initiated [] Hold pending MD visit [] Discharge    Electronically signed by: Laina Narvaez, PT, DPT [id number]   Treatment Assisted by: Dami Carbone, PTA [id number]

## 2022-02-24 ENCOUNTER — CARE COORDINATION (OUTPATIENT)
Dept: CASE MANAGEMENT | Age: 47
End: 2022-02-24

## 2022-02-24 NOTE — CARE COORDINATION
[facility] Transitions Follow Up Call    2022    Patient: Ayanna Lewis 37.  Patient :    MRN: [id number]  Reason for Admission: PE  Discharge Date: 22 RARS: Readmission Risk Score: 5.4 ( )         Spoke with: Clementina Peterson Transitions Subsequent and Final Call    Subsequent and Final Calls  Do you have any ongoing symptoms?: Yes  Patient-reported symptoms: Cough  Have your medications changed?: No  Do you have any questions related to your medications?: No  Do you currently have any active services?: No  Do you have any needs or concerns that I can assist you with?: No  Identified Barriers: None  Care Transitions Interventions  Other Interventions: Follow Up  Future Appointments   Date Time Provider Christian Beebe   2022 10:00 AM Bismark Fuller PTA [facility]   3/1/2022  8:45 AM Christi Sanon MD [facility]   3/2/2022  8:30 AM Bismark Fuller PTA [facility]   3/3/2022 10:20 AM Annie Burns APRN - CNP [facility]   2022 10:45 AM [facility]       Spoke to pt who stated he was doing good. States he has no cp and is taking his Eliquis as prescribed. Denies any dizziness, lightheadedness or sweating. States he does have a dry cough at times. Pt is active with PT for a repaired rotator cuff and states this is going well. He is still in a brace for 3-4 more weeks. He saw his PCP and surgeon and Hem/onc drMoncho No changes to medications. No questions or concerns at this time. Will continue to follow.      ROBYN Luevano, RN   Care Transition Nurse  Mobile: [phone]

## 2022-02-28 ENCOUNTER — HOSPITAL ENCOUNTER (OUTPATIENT)
Dept: PHYSICAL THERAPY | Age: 47
Setting detail: THERAPIES SERIES
Discharge: HOME | End: 2022-02-28
Payer: COMMERCIAL

## 2022-02-28 PROCEDURE — 97110 THERAPEUTIC EXERCISES: CPT

## 2022-02-28 PROCEDURE — 97140 MANUAL THERAPY 1/> REGIONS: CPT

## 2022-02-28 PROCEDURE — 97530 THERAPEUTIC ACTIVITIES: CPT

## 2022-02-28 PROCEDURE — 97112 NEUROMUSCULAR REEDUCATION: CPT

## 2022-02-28 NOTE — FLOWSHEET NOTE
100 [facility] a Department of [address], [address]  Office: [phone]  Fax:  [phone]        Physical Therapy Treatment Note/ Progress Report:         Date:  2022    Patient Name:  Naty Marcano    :    MRN: [id number]  Restrictions/Precautions:    Medical/Treatment Diagnosis Information:  · Diagnosis: s/p left shoulder biceps tenodesis. Surgery on 22;  M25.512- left shoulder pain  · Treatment Diagnosis: M25.512- left shoulder pain  Insurance/Certification information:  PT Insurance Information: Nitish Mcintyre 150  Physician Information:  Referring Practitioner: Paras Antunez  Has the plan of care been signed (Y/N):        []  Yes  [x]  No     Date of Patient follow up with Physician: 1 Mar 22      Is this a Progress Report:     []  Yes  [x]  No        If Yes:  Date Range for reporting period:  Beginning 2022  Ending    Progress report will be due (10 Rx or 30 days whichever is less): 8 Mar 2022          Visit # Insurance Allowable Auth Required   6 auto []  Yes [x]  No        Functional Scale: UEFI-8.75%   Date assessed: 2022    Pain level: 2/10    SUBJECTIVE:  Most pain and symptoms are experienced at night when laying in bed and during sleep. Pt reports symptoms as \"consistent ache\" that extends laterally from L AC joint through middle delt to delt tuberosity. Pt states that pain is well managed throughout the day and has not had to take any oral medication for pain or symptoms over the past week.      OBJECTIVE: 22    Observation:     Test measurements:         ROM PROM AROM  Comment    L R L R    Flexion 152 supine   150 with trunk ext  173    Abduction 130 supine   NT  193    ER  77 @ 90 manual  Kars@[employer] WBS with rib flare supine 102    IR   Postumus@[employer] supine  40    Other        Other             Strength L R Comment   Flexion      Abduction      ER      IR      Supraspinatus      Upper Trap Lower Trap      Mid Trap      Rhomboids      Biceps      Triceps      Horizontal Abduction      Horizontal Adduction      Lats          RESTRICTIONS/PRECAUTIONS: full motion of shoulder. NWB. 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 Exercises/Interventions:   Exercise/Equipment Resistance/Repetitions Other comments   Aerobic Conditioning     Aerodyne          Stretching/PROM     Supine Wand  10x:10  Flex/scap   Table Slides Wall slides  Walks 10x:10  2/28-Initiated    UE [place] 10x:10 ER at neutral and 45  2/23-90 added   In supine    Pulleys 10x:10  Flex/scap   Pendulum     BB IR 10x:10 Added 2/16-Strap Stretch    SL IR          Pec doorway stretch     CBA stretch     UT stretch     LS stretch     Isometrics     Retraction      Scap Stabs on Incline Hi/Lo Compression 10x:5  Up/Down, Side/side 1x15 each  2/28-Ball on Hi/Low inclined to 30 degrees    Weight shift     Flexion Abduction External Rotation Internal Rotation Biceps     Triceps          PRE's     Flexion     Abduction     External Rotation     Internal Rotation     Shrugs 3x10 1# ankle wt on wrist     EXT     Reverse Flys     Serratus 2x15  ER [place] Cane  2/23-Cane Added    Horizontal Abd with ER     Biceps PROM 10x:10    Triceps     Retraction          Cable Column/Theraband     External Rotation 2x10 Red     Internal Rotation 2x10 Red  2/28-Eccentric Focus    Shrugs     Lats     Ext     Flex     Scapular Retraction 3x10 Green     BIC     TRIC     PNF          Dynamic Stability          Plyoback          Manual Flex, ABD, IR, ER 10' with oscillations (grade I) Added 2/23               Access Code: Brook Herber  URL: [email or web address]  Date: 02/08/2022  Prepared by: Francisca Stuart    Exercises  Seated Gripping Towel - 2 x daily - 7 x weekly - 3 reps - 1 sets - 2-3 hold  Seated Elbow Flexion AAROM - 2 x daily - 7 x weekly - 10 sets - 10 hold  Seated Scapular Retraction - 2 x daily - 7 x weekly - 10 reps - 10 hold  Seated Shoulder Shrugs - 2 x daily - 7 x weekly - 10 reps - 3 sets  Seated Shoulder Flexion Towel Slide at Table Top - 2 x daily - 7 x weekly - 10 reps - 10 hold  Seated Shoulder Scaption Slide at Table Top with Forearm in Neutral - 2 x daily - 7 x weekly - 10 reps - 10 hold  Seated Shoulder External Rotation AAROM with Cane and Hand in Neutral - 2 x daily - 7 x weekly - 10 reps - 10 hold    2/16/22: Nakul Givens, PTA   Supine Shoulder External Rotation in 45 Degrees Abduction AAROM with Dowel - 2 x daily - 7 x weekly - 10 reps - 10 hold  Supine Shoulder Flexion Extension AAROM with Dowel - 2 x daily - 7 x weekly - 10 reps - 10 hold  Standing Shoulder Internal Rotation Stretch with Towel - 2 x daily - 7 x weekly - 10 reps - 10 hold  Supine Scapular Protraction in Flexion with Dumbbells - 1 x daily - 7 x weekly - 3 sets - 10 reps        Therapeutic Exercise and NMR EXR  [x] (67666) Provided verbal/tactile cueing for activities related to strengthening, flexibility, endurance, ROM  for improvements in scapular, scapulothoracic and UE control with self care, reaching, carrying, lifting, house/yardwork, driving/computer work. [x] (65243) Provided verbal/tactile cueing for activities related to improving balance, coordination, kinesthetic sense, posture, motor skill, proprioception  to assist with  scapular, scapulothoracic and UE control with self care, reaching, carrying, lifting, house/yardwork, driving/computer work. Therapeutic Activities:    [x] (56356 or 12135) Provided verbal/tactile cueing for activities related to improving balance, coordination, kinesthetic sense, posture, motor skill, proprioception and motor activation to allow for proper function of scapular, scapulothoracic and UE control with self care, carrying, lifting, driving/computer work.      Home Exercise Program:    [x] (22041) Reviewed/Progressed HEP activities related to strengthening, flexibility, endurance, ROM of scapular, scapulothoracic and UE control with self care, reaching, carrying, lifting, house/yardwork, driving/computer work  [x] (08496) Reviewed/Progressed HEP activities related to improving balance, coordination, kinesthetic sense, posture, motor skill, proprioception of scapular, scapulothoracic and UE control with self care, reaching, carrying, lifting, house/yardwork, driving/computer work      Manual Treatments:  PROM / STM / Oscillations-Mobs:  G-I, II, III, IV (PA's, Inf., Post.)  [x] (08711) Provided manual therapy to mobilize soft tissue/joints of cervical/CT, scapular GHJ and UE for the purpose of modulating pain, promoting relaxation,  increasing ROM, reducing/eliminating soft tissue swelling/inflammation/restriction, improving soft tissue extensibility and allowing for proper ROM for normal function with self care, reaching, carrying, lifting, house/yardwork, driving/computer work    Pt Education: Patient education on PT and plan of care including diagnosis, prognosis, treatment goals and options. Patient agrees with discussed POC and treatment and is aware of rehab process. Pt was also educated on clinic layout and use of modalities. PT gave pt business card to call if any questions. Pt was ed on sling use, wound care, self care, dressing, showering, donning/doffing sling, and protocol. Pt was ed on S&S of infection and what to do if these are experienced. Modalities: declined    Charges:   Timed Code Treatment Minutes: 61'   Total Treatment Minutes: 61'     [] EVAL (LOW) 455 1011   [] EVAL (MOD) 33710   [] EVAL (HIGH) 75670   [] RE-EVAL   [x] OJ(93663) x  1   [] IONTO  [x] NMR (52091) x  1   [] VASO  [x] Manual (30015) x 1     [] Other:  [x] TA x  1    [] Mech Traction (04078)  [] ES(attended) (99023)      [] ES (un) (41538):     GOALS:   Patient stated goal: return to normal activity    [] Progressing: [] Met: [] Not Met: [] Adjusted     Therapist goals for Patient:   Short Term Goals: To be achieved in: 2 weeks  1.  Independent in HEP and progression per patient tolerance, in order to prevent re-injury. [] Progressing: [] Met: [] Not Met: [] Adjusted   2. Patient will report pain at worst less than or equal to 4/10. [] Progressing: [] Met: [] Not Met: [] Adjusted     Long Term Goals: To be achieved in: 16 weeks  1. Pt will demo UEFI of 90% or better to assist with reaching prior level of function. [] Progressing: [] Met: [] Not Met: [] Adjusted  2. Patient will demonstrate increased AROM to shoulder flex greater than or equal to 170, ABD greater than or equal to 170, ER greater than or equal to 80, IR greater than or equal to 50 to allow for proper joint functioning as indicated by patients Functional Deficits. [] Progressing: [] Met: [] Not Met: [] Adjusted  3. Patient will demonstrate an increase in strength to shoulder flex greater than or equal to 4+, ABD greater than or equal to 4+, ER greater than or equal to 4, IR greater than or equal to 4+ to allow for proper functional mobility as indicated by patients functional deficits. [] Progressing: [] Met: [] Not Met: [] Adjusted  4. Patient will return to performing all self care without increased symptoms or restriction. [] Progressing: [] Met: [] Not Met: [] Adjusted  5. Pt will report pain at worst less than or equal to 2/10. [] Progressing: [] Met: [] Not Met: [] Adjusted  6. Pt will return to his normal workout routine. [] Progressing: [] Met: [] Not Met: [] Adjusted           Overall Progression Towards Functional goals/ Treatment Progress Update:  [] Patient is progressing as expected towards functional goals listed. [] Progression is slowed due to complexities/Impairments listed. [] Progression has been slowed due to co-morbidities.   [x] Plan just implemented, too soon to assess goals progression <30days   [] Goals require adjustment due to lack of progress  [] Patient is not progressing as expected and requires additional follow up with physician  [] Other    Prognosis for POC: [x] Good [] Fair  [] Poor      Patient requires continued skilled intervention: [x] Yes  [] No    Treatment/Activity Tolerance:  [x] Patient able to complete treatment  [] Patient limited by fatigue  [] Patient limited by pain     [] Patient limited by other medical complications  [] Other: Patient tolerated treatment well. Progressions listed in bold in ex log this date per POC and pt tolerated increased activity and challenge well with appropriate scapular posture and UE mechanics maintained. Pt was instructed to have increased challenge during eccentric portion of Tband IR with good performance displayed by pt. Pt demo'd L latissimus flexibility deficits and scapular blocking was performed during supine flexion/abd PROM this date to decrease tissue restrictions through entire L shoulder complex. Initiated scapular stabs on table this date to increase RTC and periscapular muscle activation with special attention to surgically repaired tissues. Pt has 4 week f/u with MD tomorrow and will plan to continue with current POC to improve L UE strength, endurance, ROM and function unless MD makes other recommendations. PLAN: If pt doesn't return, this note can be considered a D/C note. Progress through POC with MD recommendations.      [x] Continue per plan of care [] Alter current plan (see comments above)  [] Plan of care initiated [] Hold pending MD visit [] Discharge    Electronically signed by: Patricia Vaca PTA, [id number]

## 2022-03-01 ENCOUNTER — OFFICE VISIT (OUTPATIENT)
Dept: ORTHOPEDIC SURGERY | Age: 47
End: 2022-03-01

## 2022-03-01 VITALS — BODY MASS INDEX: 25.6 KG/M2 | HEIGHT: 72 IN | RESPIRATION RATE: 12 BRPM | WEIGHT: 189 LBS

## 2022-03-01 DIAGNOSIS — M25.512 ACUTE PAIN OF LEFT SHOULDER: Primary | ICD-10-CM

## 2022-03-01 DIAGNOSIS — M75.22 BICEPS TENDINITIS ON LEFT: ICD-10-CM

## 2022-03-01 DIAGNOSIS — S43.432A SUPERIOR LABRUM ANTERIOR-TO-POSTERIOR (SLAP) TEAR OF LEFT SHOULDER: ICD-10-CM

## 2022-03-01 PROCEDURE — 99024 POSTOP FOLLOW-UP VISIT: CPT | Performed by: ORTHOPAEDIC SURGERY

## 2022-03-01 NOTE — PROGRESS NOTES
Galilea Schwartz returns today status post left shoulder arthroscopy with biceps tenodesis. He is doing very well. At this point, incisions are nicely healed. Neurologic and vascular exams are normal he has no tenderness. Contour is appropriate of the bicep. 111 come out of the sling and progress activity over the next 6 weeks. Follow-up with me at that time.

## 2022-03-02 ENCOUNTER — HOSPITAL ENCOUNTER (OUTPATIENT)
Dept: PHYSICAL THERAPY | Age: 47
Setting detail: THERAPIES SERIES
Discharge: HOME | End: 2022-03-02
Payer: OTHER MISCELLANEOUS

## 2022-03-02 PROCEDURE — 97530 THERAPEUTIC ACTIVITIES: CPT

## 2022-03-02 PROCEDURE — 97140 MANUAL THERAPY 1/> REGIONS: CPT

## 2022-03-02 PROCEDURE — 97110 THERAPEUTIC EXERCISES: CPT
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
## 2022-03-02 NOTE — FLOWSHEET NOTE
[facility] a Department of [address]  [address]eg[address], [address]  Office: [phone]  Fax:  [phone]        Physical Therapy Treatment Note/ Progress Report:         Date:  3/2/2022    Patient Name:  Kobi Woodard    :  [id number]  MRN: [id number]  Restrictions/Precautions:    Medical/Treatment Diagnosis Information:  Diagnosis: s/p left shoulder biceps tenodesis. Surgery on 22;  M25.512- left shoulder pain  Treatment Diagnosis: M25.512- left shoulder pain  Insurance/Certification information:  PT Insurance Information: Og Villagomez  Physician Information:  Referring Practitioner: Fernanda Dickinson  Has the plan of care been signed (Y/N):        []  Yes  [x]  No     Date of Patient follow up with Physician:       Is this a Progress Report:     []  Yes  [x]  No        If Yes:  Date Range for reporting period:  Beginning 2022  Ending    Progress report will be due (10 Rx or 30 days whichever is less): 8 Mar 2022          Visit # Insurance Allowable Auth Required   7 auto []  Yes [x]  No        Functional Scale: UEFI-8.75%   Date assessed: 2022    Pain level: 2/10    SUBJECTIVE:  Pt saw MD yesterday and both MD and pt are pleased with progress post operatively. MD has D/C'd sling and will f/u with pt in 6 weeks. MD would like pt to work on improving active ABD ROM with appropriate scapular mechanics as mild hike and scapular elevation are present.        OBJECTIVE: 22   Observation:    Test measurements:         ROM PROM AROM  Comment    L R L R    Flexion 152 supine   150 with trunk ext     Abduction 130 supine   NT     ER  77 @ 90 manual  [email or web address] with rib flare supine    IR   [email or web address] supine     Other        Other             Strength L R Comment   Flexion      Abduction      ER      IR      Supraspinatus      Upper Trap      Lower Trap      Mid Trap      Rhomboids      Biceps      Triceps      Horizontal Abduction      Horizontal Adduction      Lats          RESTRICTIONS/PRECAUTIONS: full motion of shoulder. NWB. 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 Exercises/Interventions:   Exercise/Equipment Resistance/Repetitions Other comments   Aerobic Conditioning     Aerodyne          Stretching/PROM     Supine Wand    Flex/scap   Table Slides Wall slides  Walks 10x:10 each  Flexion/ABD 2/28-Initiated    Wall Slide to ECC ABD  1x20  3/2-Flexion wall slide to ECC lower into ABD    UE [place] ER at neutral and 45  2/23-90 added   In supine    AAROM ABD  [place] 10x:10    Pulleys 10x:10  Flex/scap   Pendulum     BB IR 10x:10 Added 2/16-Strap Stretch    SL IR          Pec doorway stretch     CBA stretch     UT stretch     LS stretch     Isometrics     Retraction     Scap Stabs on Incline Hi/Lo 2/28-Ball on Hi/Low inclined to 30 degrees    Weight shift     SA Roller Wall Slide  Long Foam Roller   1x15  Added 3/2    Flexion Abduction External Rotation Internal Rotation Biceps     Triceps          PRE's     Flexion     Abduction     External Rotation S/L 1x10 0#  S/L 2x10 2# ankle wt  3/2-added   Internal Rotation     Shrugs    EXT     Reverse Flys     Serratus 2# 2x10  ER [place] Cane  2/23-Cane Added    Horiz ABD   Horizontal Abd with ER Prone 2x10   Prone 2x10  3/2-added    Biceps    Triceps     Retraction          Cable Column/Theraband     External Rotation 2x15 Red     Internal Rotation 2x15 Red  2/28-Eccentric Focus    Shrugs     Lats     Ext     Flex     Scapular Retraction 2x15 Green     BIC     TRIC     PNF          Dynamic Stability          Plyoback          Manual Flex, ABD, IR, ER 10' with oscillations (grade I) Added 2/23               Access Code: Mackenzie Triana  URL: [email or web address]  Date: 02/08/2022  Prepared by: Herb Anderson Cessdidi    Exercises  Seated Gripping Towel - 2 x daily - 7 x weekly - 3 reps - 1 sets - 2-3 hold  Seated Elbow Flexion AAROM - 2 x daily - 7 x weekly - 10 sets - 10 hold  Seated Scapular Retraction - 2 x daily - 7 x weekly - 10 reps - 10 hold  Seated Shoulder Shrugs - 2 x daily - 7 x weekly - 10 reps - 3 sets  Seated Shoulder Flexion Towel Slide at Table Top - 2 x daily - 7 x weekly - 10 reps - 10 hold  Seated Shoulder Scaption Slide at Table Top with Forearm in Neutral - 2 x daily - 7 x weekly - 10 reps - 10 hold  Seated Shoulder External Rotation AAROM with Cane and Hand in Neutral - 2 x daily - 7 x weekly - 10 reps - 10 hold    2/16/22: Robyn Morel, PTA   Supine Shoulder External Rotation in 45 Degrees Abduction AAROM with Dowel - 2 x daily - 7 x weekly - 10 reps - 10 hold  Supine Shoulder Flexion Extension AAROM with Dowel - 2 x daily - 7 x weekly - 10 reps - 10 hold  Standing Shoulder Internal Rotation Stretch with Towel - 2 x daily - 7 x weekly - 10 reps - 10 hold  Supine Scapular Protraction in Flexion with Dumbbells - 1 x daily - 7 x weekly - 3 sets - 10 reps    Access Code: Caterina Marcos  URL: [email or web address]  Date: 03/02/2022  Prepared by: Nathan Sensor  Exercises  Shoulder Flexion Wall Slide with Towel - 1 x daily - 7 x weekly - 3 sets - 10 reps  Standing Shoulder Abduction Slides at [address] - 1 x daily - 7 x weekly - 3 sets - 10 reps  Seated Scapular Retraction - 2 x daily - 7 x weekly - 10 reps - 10 hold  Seated Shoulder Shrugs - 2 x daily - 7 x weekly - 10 reps - 3 sets  Supine Shoulder External Rotation in 45 Degrees Abduction AAROM with Dowel - 2 x daily - 7 x weekly - 10 reps - 10 hold  Standing Shoulder Internal Rotation Stretch with Towel - 2 x daily - 7 x weekly - 10 reps - 10 hold  Supine Scapular Protraction in Flexion with Dumbbells - 1 x daily - 7 x weekly - 3 sets - 10 reps  Sidelying Shoulder External Rotation with Dumbbell - 1 x daily - 7 x weekly - 3 sets - 10 reps  Prone Shoulder Extension - Single Arm with Dumbbell - 1 x daily - 7 x weekly - 3 sets - 10 reps  Prone Single Arm Shoulder Horizontal Abduction with Dumbbell - Palm Down - 1 x daily - 7 x weekly - 3 sets - 10 reps          Therapeutic Exercise and NMR EXR  [x] (84408) Provided verbal/tactile cueing for activities related to strengthening, flexibility, endurance, ROM  for improvements in scapular, scapulothoracic and UE control with self care, reaching, carrying, lifting, house/yardwork, driving/computer work. [x] (26251) Provided verbal/tactile cueing for activities related to improving balance, coordination, kinesthetic sense, posture, motor skill, proprioception  to assist with  scapular, scapulothoracic and UE control with self care, reaching, carrying, lifting, house/yardwork, driving/computer work. Therapeutic Activities:    [x] (16398 or 65128) Provided verbal/tactile cueing for activities related to improving balance, coordination, kinesthetic sense, posture, motor skill, proprioception and motor activation to allow for proper function of scapular, scapulothoracic and UE control with self care, carrying, lifting, driving/computer work.      Home Exercise Program:    [x] (51558) Reviewed/Progressed HEP activities related to strengthening, flexibility, endurance, ROM of scapular, scapulothoracic and UE control with self care, reaching, carrying, lifting, house/yardwork, driving/computer work  [x] (68409) Reviewed/Progressed HEP activities related to improving balance, coordination, kinesthetic sense, posture, motor skill, proprioception of scapular, scapulothoracic and UE control with self care, reaching, carrying, lifting, house/yardwork, driving/computer work      Manual Treatments:  PROM / STM / Oscillations-Mobs:  G-I, II, III, IV (PA's, Inf., Post.)  [x] (09118) Provided manual therapy to mobilize soft tissue/joints of cervical/CT, scapular GHJ and UE for the purpose of modulating pain, promoting relaxation,  increasing ROM, reducing/eliminating soft tissue swelling/inflammation/restriction, improving soft tissue extensibility and allowing for proper ROM for normal function with self care, reaching, carrying, lifting, house/yardwork, driving/computer work    Pt Education: Patient education on PT and plan of care including diagnosis, prognosis, treatment goals and options. Patient agrees with discussed POC and treatment and is aware of rehab process. Pt was also educated on clinic layout and use of modalities. PT gave pt business card to call if any questions. Pt was ed on sling use, wound care, self care, dressing, showering, donning/doffing sling, and protocol. Pt was ed on S&S of infection and what to do if these are experienced. Modalities: declined    Charges:   Timed Code Treatment Minutes: 48'   Total Treatment Minutes: 48'     [] EVAL (LOW) 455 1011   [] EVAL (MOD) 61699   [] EVAL (HIGH) 81400   [] RE-EVAL   [x] GM(22458) x  1   [] IONTO  [] NMR (06201) x  1   [] VASO  [x] Manual (35584) x 1     [] Other:  [x] TA x  1    [] Mech Traction (79504)  [] ES(attended) (42152)      [] ES (un) (93936):     GOALS:   Patient stated goal: return to normal activity    [] Progressing: [] Met: [] Not Met: [] Adjusted     Therapist goals for Patient:   Short Term Goals: To be achieved in: 2 weeks  1. Independent in HEP and progression per patient tolerance, in order to prevent re-injury. [] Progressing: [] Met: [] Not Met: [] Adjusted   2. Patient will report pain at worst less than or equal to 4/10. [] Progressing: [] Met: [] Not Met: [] Adjusted     Long Term Goals: To be achieved in: 16 weeks  1. Pt will demo UEFI of 90% or better to assist with reaching prior level of function. [] Progressing: [] Met: [] Not Met: [] Adjusted  2. Patient will demonstrate increased AROM to shoulder flex greater than or equal to 170, ABD greater than or equal to 170, ER greater than or equal to 80, IR greater than or equal to 50 to allow for proper joint functioning as indicated by patients Functional Deficits. [] Progressing: [] Met: [] Not Met: [] Adjusted  3.  Patient will demonstrate an increase in strength to shoulder flex greater than or equal to 4+, ABD greater than or equal to 4+, ER greater than or equal to 4, IR greater than or equal to 4+ to allow for proper functional mobility as indicated by patients functional deficits. [] Progressing: [] Met: [] Not Met: [] Adjusted  4. Patient will return to performing all self care without increased symptoms or restriction. [] Progressing: [] Met: [] Not Met: [] Adjusted  5. Pt will report pain at worst less than or equal to 2/10. [] Progressing: [] Met: [] Not Met: [] Adjusted  6. Pt will return to his normal workout routine. [] Progressing: [] Met: [] Not Met: [] Adjusted           Overall Progression Towards Functional goals/ Treatment Progress Update:  [] Patient is progressing as expected towards functional goals listed. [] Progression is slowed due to complexities/Impairments listed. [] Progression has been slowed due to co-morbidities. [x] Plan just implemented, too soon to assess goals progression <30days   [] Goals require adjustment due to lack of progress  [] Patient is not progressing as expected and requires additional follow up with physician  [] Other    Prognosis for POC: [x] Good [] Fair  [] Poor      Patient requires continued skilled intervention: [x] Yes  [] No    Treatment/Activity Tolerance:  [x] Patient able to complete treatment  [] Patient limited by fatigue  [] Patient limited by pain     [] Patient limited by other medical complications  [] Other: Patient tolerated treatment well. Initiated prone scapular strengthening as mild L scapular winging present, when compared bilaterally, during SA roller wall slide this date. Pt tolerated increased strengthening well with appropriate fatigue expressed in rhomboid and MT muscle groups. HEP updated to include active strengthening and pt advised to increase hold times (TUT) to increase intensity, before increasing weighted resistance to decrease stress through surgically repaired tissues. Assess tolerance to increased exercise Rx and HEP and continue to progress RTC and periscapular strength to continue to progress pt toward's LTG's. PLAN: If pt doesn't return, this note can be considered a D/C note. Progress through POC with MD recommendations. Complete either bands or wts with ER.     [x] Continue per plan of care [] Alter current plan (see comments above)  [] Plan of care initiated [] Hold pending MD visit [] Discharge    Electronically signed by: Justin Angela, PTA, [id number]

## 2022-03-03 ENCOUNTER — OFFICE VISIT (OUTPATIENT)
Dept: CARDIOLOGY CLINIC | Age: 47
End: 2022-03-03
Payer: COMMERCIAL

## 2022-03-03 VITALS
BODY MASS INDEX: 25.19 KG/M2 | DIASTOLIC BLOOD PRESSURE: 64 MMHG | WEIGHT: 186 LBS | OXYGEN SATURATION: 98 % | HEIGHT: 72 IN | HEART RATE: 74 BPM | SYSTOLIC BLOOD PRESSURE: 116 MMHG

## 2022-03-03 DIAGNOSIS — I26.99 OTHER ACUTE PULMONARY EMBOLISM WITHOUT ACUTE COR PULMONALE (HCC): Primary | ICD-10-CM

## 2022-03-03 PROCEDURE — 99214 OFFICE O/P EST MOD 30 MIN: CPT | Performed by: NURSE PRACTITIONER

## 2022-03-04 ENCOUNTER — CARE COORDINATION (OUTPATIENT)
Dept: CASE MANAGEMENT | Age: 47
End: 2022-03-04

## 2022-03-04 NOTE — CARE COORDINATION
[facility] Transitions Follow Up Call    3/4/2022    Patient: Alfredo Phillips  Patient : [id number]   MRN: [id number]  Reason for Admission: PE  Discharge Date: 22 RARS: Readmission Risk Score: 5.4 ( )         Attempted to contact patient for follow up transition call. Left voicemail message to return call with an update on condition since discharge. Contact information provided. Will continue to follow up. Care Transitions Subsequent and Final Call    Subsequent and Final Calls  Care Transitions Interventions  Other Interventions:            Follow Up  Future Appointments   Date Time Provider Christian Beebe   3/7/2022 10:00 AM Erleharrison Gil, PTA [address]   3/9/2022  8:30 AM Erlarry Gil, PTA [facility]   3/14/2022  8:30 AM Anthony Gil, PTA [address]   3/17/2022  8:30 AM RODRÍGUEZ [facility] Shanice Griffin [facility]   2022  9:15 AM Carolee Whalen MD [facility] ORLARISSA [facility]   2022 10:45 AM Yahaira Sarabia MD [facility] JUANJO Garcia LPN

## 2022-03-07 ENCOUNTER — HOSPITAL ENCOUNTER (OUTPATIENT)
Dept: PHYSICAL THERAPY | Age: 47
Setting detail: THERAPIES SERIES
Discharge: HOME | End: 2022-03-07
Payer: OTHER MISCELLANEOUS

## 2022-03-07 PROCEDURE — 97530 THERAPEUTIC ACTIVITIES: CPT

## 2022-03-07 PROCEDURE — 97110 THERAPEUTIC EXERCISES: CPT

## 2022-03-07 PROCEDURE — 97140 MANUAL THERAPY 1/> REGIONS: CPT

## 2022-03-07 NOTE — FLOWSHEET NOTE
[facility] a Department of [facility]     [address] [place] [address], [address] [place] [address]  Office: [phone]  Fax:  [phone]        Physical Therapy Treatment Note/ Progress Report:         Date:  3/7/2022    Patient Name:  Colt Lee    :    MRN: [id number]  Restrictions/Precautions:    Medical/Treatment Diagnosis Information:  Diagnosis: s/p left shoulder biceps tenodesis. Surgery on 22;  M25.512- left shoulder pain  Treatment Diagnosis: M25.512- left shoulder pain  Insurance/Certification information:  PT Insurance Information: Mena Acevedo  Physician Information:  Referring Practitioner: Nazanin Garcia  Has the plan of care been signed (Y/N):        []  Yes  [x]  No     Date of Patient follow up with Physician:       Is this a Progress Report:     []  Yes  [x]  No        If Yes:  Date Range for reporting period:  Beginning 2022  Ending    Progress report will be due (10 Rx or 30 days whichever is less): 8 Mar 2022          Visit # Insurance Allowable Auth Required   8 auto []  Yes [x]  No        Functional Scale: UEFI-8.75%   Date assessed: 2022    Pain level: 2/10    SUBJECTIVE: Pt reports reaching behind back can cause increased pain in anterolateral shoulder if motion is performed \"too quick\". Tightness and symptoms expressed currently in Lateral AC joint through middle delt to delt tuberosity. OBJECTIVE: 3/7/22   Observation:    Test measurements:         ROM PROM AROM  Comment    L R L R    Flexion  160 with trunk ext     Abduction     ER     IR      Other: Scaption   145 trunk against wall      Other             Strength L R Comment   Flexion      Abduction      ER      IR      Supraspinatus      Upper Trap      Lower Trap      Mid Trap      Rhomboids      Biceps      Triceps      Horizontal Abduction      Horizontal Adduction      Lats          RESTRICTIONS/PRECAUTIONS: full motion of shoulder. NWB. 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 Exercises/Interventions:   Exercise/Equipment Resistance/Repetitions Other comments   Aerobic Conditioning     Aerodyne          Stretching/PROM     Supine Wand    Flex/scap   Table Slides Wall slides  Walks 10x:10 each  Flexion/ABD 2/28-Initiated    Wall Slide to ECC ABD  1x20  3/2-Flexion wall slide to ECC lower into ABD    UE [place] Ext with ant shoulder blocking  10x:10  3/7-Wall to block ant translation   1/2 Kneeling Latissimus stretch with dynaband  Green Dynaband on CC  5x:10  3/7-added    UE [place] ER at neutral and 45  2/23-90 added   In supine    AAROM ABD  [place] 10x:10    Pulleys Flex/scap   Pendulum     BB IR 10x:10 Added 2/16-Strap Stretch    SL IR          Pec doorway stretch     CBA stretch     UT stretch     LS stretch     Isometrics     Retraction     Scap Stabs on Incline Hi/Lo 2/28-Ball on Hi/Low inclined to 30 degrees    Weight shift     SA Roller Wall Slide  Long Foam Roller   1x20  Added 3/2    Flexion Abduction External Rotation Internal Rotation Biceps     Triceps          PRE's     Flexion     Abduction     External Rotation 3/2-added   Internal Rotation     Shrugs    EXT     Reverse Flys     Serratus 3# 2x10  Weighted dowel   2/23-Cane Added    Horiz ABD   Horizontal Abd with ER Prone 3x10   Prone 3x10  3/2-added    Lower Trap  Prone 2x10  3/7-Added    Biceps    Triceps     Retraction          Cable Column/Theraband     External Rotation 2x15 Red     Internal Rotation 2x10 Green   2/28-Eccentric Focus    Shrugs     Lats     Ext     Flex     Scapular Retraction 2x15 Green     BIC     TRIC     PNF          Dynamic Stability          Plyoback          Manual Flex, ABD, IR, ER 10' with oscillations (grade I) Added 2/23   IASTM anterolateral shoulder  x5 min  3/7           Access Code: Chandu Agee  URL: [email or web address]  Date: 02/08/2022  Prepared by: Myrna Yousif Cessna    Exercises  Seated Gripping Towel - 2 x daily - 7 x weekly - 3 reps - 1 sets - 2-3 hold  Seated Elbow Flexion AAROM - 2 x daily - 7 x weekly - 10 sets - 10 hold  Seated Scapular Retraction - 2 x daily - 7 x weekly - 10 reps - 10 hold  Seated Shoulder Shrugs - 2 x daily - 7 x weekly - 10 reps - 3 sets  Seated Shoulder Flexion Towel Slide at Table Top - 2 x daily - 7 x weekly - 10 reps - 10 hold  Seated Shoulder Scaption Slide at Table Top with Forearm in Neutral - 2 x daily - 7 x weekly - 10 reps - 10 hold  Seated Shoulder External Rotation AAROM with Cane and Hand in Neutral - 2 x daily - 7 x weekly - 10 reps - 10 hold    2/16/22: Lelan [place], PTA   Supine Shoulder External Rotation in 45 Degrees Abduction AAROM with Dowel - 2 x daily - 7 x weekly - 10 reps - 10 hold  Supine Shoulder Flexion Extension AAROM with Dowel - 2 x daily - 7 x weekly - 10 reps - 10 hold  Standing Shoulder Internal Rotation Stretch with Towel - 2 x daily - 7 x weekly - 10 reps - 10 hold  Supine Scapular Protraction in Flexion with Dumbbells - 1 x daily - 7 x weekly - 3 sets - 10 reps    Access Code: Spencer Flowers  URL: [email or web address]  Date: 03/02/2022  Prepared by: Beny Rizo  Exercises  Shoulder Flexion Wall Slide with Towel - 1 x daily - 7 x weekly - 3 sets - 10 reps  Standing Shoulder Abduction Slides at [address] - 1 x daily - 7 x weekly - 3 sets - 10 reps  Seated Scapular Retraction - 2 x daily - 7 x weekly - 10 reps - 10 hold  Seated Shoulder Shrugs - 2 x daily - 7 x weekly - 10 reps - 3 sets  Supine Shoulder External Rotation in 45 Degrees Abduction AAROM with Dowel - 2 x daily - 7 x weekly - 10 reps - 10 hold  Standing Shoulder Internal Rotation Stretch with Towel - 2 x daily - 7 x weekly - 10 reps - 10 hold  Supine Scapular Protraction in Flexion with Dumbbells - 1 x daily - 7 x weekly - 3 sets - 10 reps  Sidelying Shoulder External Rotation with Dumbbell - 1 x daily - 7 x weekly - 3 sets - 10 reps  Prone Shoulder Extension - Single Arm with Dumbbell - 1 x daily - 7 x weekly - 3 sets - 10 reps  Prone Single Arm Shoulder Horizontal Abduction with Dumbbell - Palm Down - 1 x daily - 7 x weekly - 3 sets - 10 reps          Therapeutic Exercise and NMR EXR  [x] (78362) Provided verbal/tactile cueing for activities related to strengthening, flexibility, endurance, ROM  for improvements in scapular, scapulothoracic and UE control with self care, reaching, carrying, lifting, house/yardwork, driving/computer work. [x] (71620) Provided verbal/tactile cueing for activities related to improving balance, coordination, kinesthetic sense, posture, motor skill, proprioception  to assist with  scapular, scapulothoracic and UE control with self care, reaching, carrying, lifting, house/yardwork, driving/computer work. Therapeutic Activities:    [x] (40194 or 42582) Provided verbal/tactile cueing for activities related to improving balance, coordination, kinesthetic sense, posture, motor skill, proprioception and motor activation to allow for proper function of scapular, scapulothoracic and UE control with self care, carrying, lifting, driving/computer work.      Home Exercise Program:    [x] (90732) Reviewed/Progressed HEP activities related to strengthening, flexibility, endurance, ROM of scapular, scapulothoracic and UE control with self care, reaching, carrying, lifting, house/yardwork, driving/computer work  [x] (00868) Reviewed/Progressed HEP activities related to improving balance, coordination, kinesthetic sense, posture, motor skill, proprioception of scapular, scapulothoracic and UE control with self care, reaching, carrying, lifting, house/yardwork, driving/computer work      Manual Treatments:  PROM / STM / Oscillations-Mobs:  G-I, II, III, IV (PA's, Inf., Post.)  [x] (57194) Provided manual therapy to mobilize soft tissue/joints of cervical/CT, scapular GHJ and UE for the purpose of modulating pain, promoting relaxation,  increasing ROM, reducing/eliminating soft tissue swelling/inflammation/restriction, improving soft tissue extensibility and allowing for proper ROM for normal function with self care, reaching, carrying, lifting, house/yardwork, driving/computer work    Pt Education: Patient education on PT and plan of care including diagnosis, prognosis, treatment goals and options. Patient agrees with discussed POC and treatment and is aware of rehab process. Pt was also educated on clinic layout and use of modalities. PT gave pt business card to call if any questions. Pt was ed on sling use, wound care, self care, dressing, showering, donning/doffing sling, and protocol. Pt was ed on S&S of infection and what to do if these are experienced. Modalities: declined    Charges:   Timed Code Treatment Minutes: 48'   Total Treatment Minutes: 48'     [] EVAL (LOW) 455 [id number]   [] EVAL (MOD) 80019   [] EVAL (HIGH) 69150   [] RE-EVAL   [x] KP(24065) x  1   [] IONTO  [] NMR (93172) x  1   [] VASO  [x] Manual (95365) x 1     [] Other:  [x] TA x  1    [] Mech Traction (43582)  [] ES(attended) (25875)      [] ES (un) (98334):     GOALS:   Patient stated goal: return to normal activity    [] Progressing: [] Met: [] Not Met: [] Adjusted     Therapist goals for Patient:   Short Term Goals: To be achieved in: 2 weeks  1. Independent in HEP and progression per patient tolerance, in order to prevent re-injury. [] Progressing: [] Met: [] Not Met: [] Adjusted   2. Patient will report pain at worst less than or equal to 4/10. [] Progressing: [] Met: [] Not Met: [] Adjusted     Long Term Goals: To be achieved in: 16 weeks  1. Pt will demo UEFI of 90% or better to assist with reaching prior level of function. [] Progressing: [] Met: [] Not Met: [] Adjusted  2. Patient will demonstrate increased AROM to shoulder flex greater than or equal to 170, ABD greater than or equal to 170, ER greater than or equal to 80, IR greater than or equal to 50 to allow for proper joint functioning as indicated by patients Functional Deficits.    [] Progressing: [] Met: [] Not Met: [] Adjusted  3. Patient will demonstrate an increase in strength to shoulder flex greater than or equal to 4+, ABD greater than or equal to 4+, ER greater than or equal to 4, IR greater than or equal to 4+ to allow for proper functional mobility as indicated by patients functional deficits. [] Progressing: [] Met: [] Not Met: [] Adjusted  4. Patient will return to performing all self care without increased symptoms or restriction. [] Progressing: [] Met: [] Not Met: [] Adjusted  5. Pt will report pain at worst less than or equal to 2/10. [] Progressing: [] Met: [] Not Met: [] Adjusted  6. Pt will return to his normal workout routine. [] Progressing: [] Met: [] Not Met: [] Adjusted           Overall Progression Towards Functional goals/ Treatment Progress Update:  [] Patient is progressing as expected towards functional goals listed. [] Progression is slowed due to complexities/Impairments listed. [] Progression has been slowed due to co-morbidities. [x] Plan just implemented, too soon to assess goals progression <30days   [] Goals require adjustment due to lack of progress  [] Patient is not progressing as expected and requires additional follow up with physician  [] Other    Prognosis for POC: [x] Good [] Fair  [] Poor      Patient requires continued skilled intervention: [x] Yes  [] No    Treatment/Activity Tolerance:  [x] Patient able to complete treatment  [] Patient limited by fatigue  [] Patient limited by pain     [] Patient limited by other medical complications  [] Other: Patient tolerated treatment well. Increased emphasis with A/A stretches utilizing UE ranger and Dynabands this date with appropriate education provided to isolate latissimus muscle group during stretches. Pt continues to benefit from scapular blocking during PROM, however, decreased ant HH translation throughout interventions when compared to previous tx, especially noted during PROM IR.  IASTM was utilized this date to decrease anterolateral tissue restriction and soreness expressed with activity per pt. Pt performed 1/2 kneeling lat stretch this date and was educated on postures to achieve to complete stretch with HEP to further improve L UE ROM and function in the OH plane. Plan for progress note NPV to assess prognosis and appropriate continued POC to return pt to PLOF. PLAN: If pt doesn't return, this note can be considered a D/C note. Progress through POC with MD recommendations. Complete Progress Note NV.   [x] Continue per plan of care [] Alter current plan (see comments above)  [] Plan of care initiated [] Hold pending MD visit [] Discharge    Electronically signed by: Sherwin Palumbo, PTA, [id number]

## 2022-03-09 ENCOUNTER — HOSPITAL ENCOUNTER (OUTPATIENT)
Dept: PHYSICAL THERAPY | Age: 47
Setting detail: THERAPIES SERIES
Discharge: HOME | End: 2022-03-09
Payer: OTHER MISCELLANEOUS

## 2022-03-09 PROCEDURE — 97140 MANUAL THERAPY 1/> REGIONS: CPT

## 2022-03-09 PROCEDURE — 97110 THERAPEUTIC EXERCISES: CPT

## 2022-03-09 PROCEDURE — 97530 THERAPEUTIC ACTIVITIES: CPT

## 2022-03-09 NOTE — PROGRESS NOTES
been 4/10. Decreased \"rib flare\" and thoracic extension demo'd with OH flexion, however, still present for ~final 10 degrees of available GHJ ROM. Pt making appropriate progress through current POC and will plan to incorporate gentle bicep strengthening starting next week per post op protocol to continue to progress pt towards LTG's and PLOF without pain or compensations. Recommend continuing tx 2x/wk x 6-8 wks to address ROM, strength, function. Date:  3/9/2022    Patient Name:  Jenny Zuñiga    :    MRN: [id number]  Restrictions/Precautions:    Medical/Treatment Diagnosis Information:  · Diagnosis: s/p left shoulder biceps tenodesis. Surgery on 22;  M25.512- left shoulder pain  · Treatment Diagnosis: M25.512- left shoulder pain  Insurance/Certification information:  PT Insurance Information: Erika Arroyo  Physician Information:  Referring Practitioner: Dipti Arauz  Has the plan of care been signed (Y/N):        []  Yes  [x]  No     Date of Patient follow up with Physician:       Is this a Progress Report:     [x]  Yes  []  No        If Yes:  Date Range for reporting period:  Beginning 2022  Ending 9 Mar 22    Progress report will be due (19 Rx or 30 days whichever is less): 2022          Visit # Insurance Allowable Auth Required   9 auto []  Yes [x]  No        Functional Scale: UEFI-8.75%   Date assessed: 2022  UEFI: raw score: 72; function: 90%   3/9/2022    Pain level: 1-2/10    SUBJECTIVE: Pt 5 weeks s/p L bicep tenodesis this date. Reports muscle \"soreness\" after previous visit that resolved within 24 hours. Continues to report mild symptoms if reaching behind back \"too fast\", however, is pleased with progress at this time post operatively.         OBJECTIVE: 3/9/22    Observation:     Test measurements:         ROM PROM AROM  Comment    L R L R    Flexion 170 supine   160    Abduction 165 supine   156    ER 85 @ 90 manual 95 @90  T1    IR 80 @90  Terrance@[employer]  T9 Other: [employer]        Other             Strength L R Comment   Flexion      Abduction      ER      IR      Supraspinatus      Upper Trap      Lower Trap      Mid Trap      Rhomboids      Biceps      Triceps      Horizontal Abduction      Horizontal Adduction      Lats          RESTRICTIONS/PRECAUTIONS: full motion of shoulder. NWB. 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         Exercises/Interventions:   Exercise/Equipment Resistance/Repetitions Other comments   Aerobic Conditioning     Aerodyne          Stretching/PROM     Supine Wand      Table Slides Wall slides  Walks 10x:10 each  Flexion/ABD 2/28-Initiated    Wall Slide to ECC ABD  1x20  3/2-Flexion wall slide to ECC lower into ABD    UE [place] Ext with ant shoulder blocking  10x:10  3/7-Wall to block ant translation   1/2 Kneeling Latissimus stretch with dynaband  Green Dynaband on CC  5x:10  3/7-added    UE [place] ER at neutral and 45  2/23-90 added   In supine    AAROM ABD  [place] 10x:10    Pulleys Flex/scap   Pendulum     BB IR 10x:10 UE [place]  Added 2/16-Strap Stretch    SL IR          Pec doorway stretch     CBA stretch     UT stretch     LS stretch     Isometrics     Retraction     Scap Stabs on Incline Hi/Lo Up/Down, Side/side, CW/CCW 2x:20 each 500g Ball  2/28-Ball on Hi/Low inclined to 30 degrees   3/9-CW/CCW added on wall    Weight shift     SA Roller Wall Slide  Long Foam Roller   1x20  Added 3/2    Flexion Abduction External Rotation Internal Rotation Biceps     Triceps          PRE's     Flexion     Abduction     External Rotation 3/2-added   Internal Rotation     Shrugs    EXT     Reverse Flys     Serratus 3# 3x10  Weighted dowel   2/23-Cane Added    Horiz ABD   Horizontal Abd with ER 3/2-added   3/9 NP'd resume NPV    Lower Trap  3/7-Added   3/9-NP'd resume NPV    Biceps    Triceps     Retraction          Cable Column/Theraband     External Rotation 2x15 Red     Internal Rotation 2x15 Green   2/28-Eccentric Focus    Shrugs     Lats     Ext Supinated Green   2x10  3/9-Added; back of wrist on handle    Flex     Scapular Retraction    BIC     TRIC     PNF          Dynamic Stability          Plyoback          Manual Flex, ABD, IR, ER 10' with oscillations (grade I) Added 2/23   Prone Latissimus STM  4'  3/9: with scap mobs to improve UE function    IASTM anterolateral shoulder  3/7           Access Code: Adriane Flanagan  URL: [email or web address]  Date: 02/08/2022  Prepared by: Monica Dumont Cessna    Exercises  Seated Gripping Towel - 2 x daily - 7 x weekly - 3 reps - 1 sets - 2-3 hold  Seated Elbow Flexion AAROM - 2 x daily - 7 x weekly - 10 sets - 10 hold  Seated Scapular Retraction - 2 x daily - 7 x weekly - 10 reps - 10 hold  Seated Shoulder Shrugs - 2 x daily - 7 x weekly - 10 reps - 3 sets  Seated Shoulder Flexion Towel Slide at Table Top - 2 x daily - 7 x weekly - 10 reps - 10 hold  Seated Shoulder Scaption Slide at Table Top with Forearm in Neutral - 2 x daily - 7 x weekly - 10 reps - 10 hold  Seated Shoulder External Rotation AAROM with Cane and Hand in Neutral - 2 x daily - 7 x weekly - 10 reps - 10 hold    2/16/22: Theora Sick, PTA   Supine Shoulder External Rotation in 45 Degrees Abduction AAROM with Dowel - 2 x daily - 7 x weekly - 10 reps - 10 hold  Supine Shoulder Flexion Extension AAROM with Dowel - 2 x daily - 7 x weekly - 10 reps - 10 hold  Standing Shoulder Internal Rotation Stretch with Towel - 2 x daily - 7 x weekly - 10 reps - 10 hold  Supine Scapular Protraction in Flexion with Dumbbells - 1 x daily - 7 x weekly - 3 sets - 10 reps    Access Code: Adriane Flanagan  URL: [email or web address]  Date: 03/02/2022  Prepared by: Blaine Momo  Exercises  Shoulder Flexion Wall Slide with Towel - 1 x daily - 7 x weekly - 3 sets - 10 reps  Standing Shoulder Abduction Slides at Wall - 1 x daily - 7 x weekly - 3 sets - 10 reps  Seated Scapular Retraction - 2 x daily - 7 x weekly - 10 reps - 10 hold  Seated Shoulder Shrugs - 2 x daily - 7 x weekly - 10 reps - 3 sets  Supine Shoulder External Rotation in 45 Degrees Abduction AAROM with Dowel - 2 x daily - 7 x weekly - 10 reps - 10 hold  Standing Shoulder Internal Rotation Stretch with Towel - 2 x daily - 7 x weekly - 10 reps - 10 hold  Supine Scapular Protraction in Flexion with Dumbbells - 1 x daily - 7 x weekly - 3 sets - 10 reps  Sidelying Shoulder External Rotation with Dumbbell - 1 x daily - 7 x weekly - 3 sets - 10 reps  Prone Shoulder Extension - Single Arm with Dumbbell - 1 x daily - 7 x weekly - 3 sets - 10 reps  Prone Single Arm Shoulder Horizontal Abduction with Dumbbell - Palm Down - 1 x daily - 7 x weekly - 3 sets - 10 reps          Therapeutic Exercise and NMR EXR  [x] (00325) Provided verbal/tactile cueing for activities related to strengthening, flexibility, endurance, ROM  for improvements in scapular, scapulothoracic and UE control with self care, reaching, carrying, lifting, house/yardwork, driving/computer work. [x] (38154) Provided verbal/tactile cueing for activities related to improving balance, coordination, kinesthetic sense, posture, motor skill, proprioception  to assist with  scapular, scapulothoracic and UE control with self care, reaching, carrying, lifting, house/yardwork, driving/computer work. Therapeutic Activities:    [x] (50925 or 97353) Provided verbal/tactile cueing for activities related to improving balance, coordination, kinesthetic sense, posture, motor skill, proprioception and motor activation to allow for proper function of scapular, scapulothoracic and UE control with self care, carrying, lifting, driving/computer work.      Home Exercise Program:    [x] (50376) Reviewed/Progressed HEP activities related to strengthening, flexibility, endurance, ROM of scapular, scapulothoracic and UE control with self care, reaching, carrying, lifting, house/yardwork, driving/computer work  [x] (34498) Reviewed/Progressed HEP activities related to improving balance, coordination, kinesthetic sense, posture, motor skill, proprioception of scapular, scapulothoracic and UE control with self care, reaching, carrying, lifting, house/yardwork, driving/computer work      Manual Treatments:  PROM / STM / Oscillations-Mobs:  G-I, II, III, IV (PA's, Inf., Post.)  [x] (34771) Provided manual therapy to mobilize soft tissue/joints of cervical/CT, scapular GHJ and UE for the purpose of modulating pain, promoting relaxation,  increasing ROM, reducing/eliminating soft tissue swelling/inflammation/restriction, improving soft tissue extensibility and allowing for proper ROM for normal function with self care, reaching, carrying, lifting, house/yardwork, driving/computer work    Pt Education: Patient education on PT and plan of care including diagnosis, prognosis, treatment goals and options. Patient agrees with discussed POC and treatment and is aware of rehab process. Pt was also educated on clinic layout and use of modalities. PT gave pt business card to call if any questions. Pt was ed on sling use, wound care, self care, dressing, showering, donning/doffing sling, and protocol. Pt was ed on S&S of infection and what to do if these are experienced. Modalities: declined    Charges:   Timed Code Treatment Minutes: 61'   Total Treatment Minutes: 61'     [] EVAL (LOW) 455 1011   [] EVAL (MOD) 22725   [] EVAL (HIGH) 43868   [] RE-EVAL   [x] OO(83366) x  2   [] IONTO  [] NMR (95200) x  1   [] VASO  [x] Manual (40417) x 1     [] Other:  [x] TA x  1    [] Mech Traction (08350)  [] ES(attended) (13974)      [] ES (un) (69961):     GOALS:   Patient stated goal: return to normal activity    [x] Progressing: [] Met: [] Not Met: [] Adjusted     Therapist goals for Patient:   Short Term Goals: To be achieved in: 2 weeks  1. Independent in HEP and progression per patient tolerance, in order to prevent re-injury. [] Progressing: [x] Met: [] Not Met: [] Adjusted   2.  Patient will report pain at worst less than or equal to 4/10. [] Progressing: [x] Met: [] Not Met: [] Adjusted     Long Term Goals: To be achieved in: 16 weeks  1. Pt will demo UEFI of 90% or better to assist with reaching prior level of function. [x] Progressing: [] Met: [] Not Met: [] Adjusted  2. Patient will demonstrate increased AROM to shoulder flex greater than or equal to 170, ABD greater than or equal to 170, ER greater than or equal to 80, IR greater than or equal to 50 to allow for proper joint functioning as indicated by patients Functional Deficits. [x] Progressing: [] Met: [] Not Met: [] Adjusted  3. Patient will demonstrate an increase in strength to shoulder flex greater than or equal to 4+, ABD greater than or equal to 4+, ER greater than or equal to 4, IR greater than or equal to 4+ to allow for proper functional mobility as indicated by patients functional deficits. [x] Progressing: [] Met: [] Not Met: [] Adjusted  4. Patient will return to performing all self care without increased symptoms or restriction. [x] Progressing: [] Met: [] Not Met: [] Adjusted  5. Pt will report pain at worst less than or equal to 2/10. [x] Progressing: [] Met: [] Not Met: [] Adjusted  6. Pt will return to his normal workout routine. [x] Progressing: [] Met: [] Not Met: [] Adjusted           Overall Progression Towards Functional goals/ Treatment Progress Update:  [x] Patient is progressing as expected towards functional goals listed. [] Progression is slowed due to complexities/Impairments listed. [] Progression has been slowed due to co-morbidities.   [] Plan just implemented, too soon to assess goals progression <30days   [] Goals require adjustment due to lack of progress  [] Patient is not progressing as expected and requires additional follow up with physician  [] Other    Prognosis for POC: [x] Good [] Fair  [] Poor      Patient requires continued skilled intervention: [x] Yes  [] No    Treatment/Activity Tolerance:  [x] Patient able to

## 2022-03-11 ENCOUNTER — CARE COORDINATION (OUTPATIENT)
Dept: CASE MANAGEMENT | Age: 47
End: 2022-03-11

## 2022-03-11 NOTE — CARE COORDINATION
[facility] Transitions Follow Up Call    3/11/2022    Patient: Essence Smith  Patient : [birth year]   MRN: [id number]  Reason for Admission: PE  Discharge Date: 22 RARS: Readmission Risk Score: 5.4 ( )    Attempted to reach pt for follow up call. Left message requesting call back. Pt returned call. States he's doing well. Denies CP or SOB. He is taking eliquis as prescribed and completed his follow up appt. He continues PT for his shoulder. Denies questions or concerns at this time. Care Transitions Subsequent and Final Call    Subsequent and Final Calls  Do you have any ongoing symptoms?: No  Have your medications changed?: No  Do you have any questions related to your medications?: No  Do you currently have any active services?: No  Do you have any needs or concerns that I can assist you with?: No  Care Transitions Interventions  Other Interventions:            Follow Up  Future Appointments   Date Time Provider Christian Beebe   3/14/2022  8:30 AM Hrai Arredondo PTA [facility] [address] [place] [address]   3/17/2022  8:30 AM OBDULIA Young [address] [place] [address] [place] [address]   2022  9:15 AM Tim Hunt MD [facility] [address] [place] [facility]   2022 10:45 AM MD LIZZ Jacinto ORTHO [facility]       Shi Vizcarra

## 2022-03-14 ENCOUNTER — HOSPITAL ENCOUNTER (OUTPATIENT)
Dept: PHYSICAL THERAPY | Age: 47
Setting detail: THERAPIES SERIES
Discharge: HOME | End: 2022-03-14
Payer: OTHER MISCELLANEOUS

## 2022-03-14 PROCEDURE — 97110 THERAPEUTIC EXERCISES: CPT

## 2022-03-14 PROCEDURE — 97140 MANUAL THERAPY 1/> REGIONS: CPT

## 2022-03-14 PROCEDURE — 97112 NEUROMUSCULAR REEDUCATION: CPT

## 2022-03-14 NOTE — FLOWSHEET NOTE
[facility] a Department of [address]  [facility], [address]  Office: [phone]  Fax:  [phone]        Physical Therapy Treatment Note/ Progress Report:           Date:  3/14/2022    Patient Name:  Yue Chamberlain    :  6[birth year]  MRN: [id number]  Restrictions/Precautions:    Medical/Treatment Diagnosis Information:  · Diagnosis: s/p left shoulder biceps tenodesis. Surgery on 22;  M25.512- left shoulder pain  · Treatment Diagnosis: M25.512- left shoulder pain  Insurance/Certification information:  PT Insurance Information: Oliver Grimaldo  Physician Information:  Referring Practitioner: Bertha Hickman  Has the plan of care been signed (Y/N):        []  Yes  [x]  No     Date of Patient follow up with Physician:       Is this a Progress Report:     [x]  Yes  []  No        If Yes:  Date Range for reporting period:  Beginning 2022  Ending 9 Mar 22    Progress report will be due (19 Rx or 30 days whichever is less): 2022          Visit # Insurance Allowable Auth Required   10 auto []  Yes [x]  No        Functional Scale: UEFI-8.75%   Date assessed: 2022  UEFI: raw score: 72; function: 90%   3/9/2022    Pain level: 1-2/10    SUBJECTIVE: Pt reports good tolerance to previous tx. Will notice increased soreness in L shoulder if he lays on it at night. Continues to notice most symptoms with shoulder extension, however, location and intensity are decreasing.        OBJECTIVE: 3/9/22    Observation:     Test measurements:         ROM PROM AROM  Comment    L R L R    Flexion 170 supine   160    Abduction 165 supine   156    ER 85 @ 90 manual 95 @90  T1    IR 80 @90  [email or web address]  T9     Other: Scaption        Other             Strength L R Comment   Flexion      Abduction      ER      IR      Supraspinatus      Upper Trap      Lower Trap      Mid Trap      Rhomboids      Biceps      Triceps      Horizontal Abduction Horizontal Adduction      Lats          RESTRICTIONS/PRECAUTIONS: full motion of shoulder. NWB. 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         Exercises/Interventions:   Exercise/Equipment Resistance/Repetitions Other comments   Aerobic Conditioning     Aerodyne          Stretching/PROM     Supine Wand      Table Slides Wall slides  Walks 10x:10 each  /ABD  Scaption with lift off   1x15  2/28-Initiated   3/14-Lift off added in plane of scaption    Wall Slide to ECC ABD  1x20  3/2-Flexion wall slide to ECC lower into ABD    UE [place] Ext with ant shoulder blocking  10x:10  3/7-Wall to block ant translation   1/2 Kneeling Latissimus stretch with dynaband  Green Dynaband on CC  5x:15  3/7-added    CC \"Doorway Hang\" Lat Stretch  5x:15  R hand blocking L scap 3/14: Added    UE [place] ER at neutral and 45  2/23-90 added   In supine    AAROM ABD  [place] 10x:10    Pulleys Flex/scap   Pendulum     BB IR 10x:10 UE [place]  Added 2/16-Strap Stretch    SL IR          Pec doorway stretch     CBA stretch     UT stretch     LS stretch     Isometrics     Retraction     Scap Stabs on Incline Hi/Lo Up/Down, Side/side, CW/CCW 2x:20 each 1 kg Ball  2/28-Ball on Hi/Low inclined to 30 degrees   3/9-CW/CCW added on wall    Weight shift     SA Roller Wall Slide   Added 3/2    Flexion Abduction External Rotation Internal Rotation Biceps NPV    Triceps          PRE's     Flexion     Abduction     External Rotation S/L 3x10 2# 3/2-added   Internal Rotation     Shrugs    EXT     Reverse Flys     Serratus 4# 3x10  Weighted dowel   2/23-Cane Added    Horiz ABD   Horizontal Abd with ER Prone 2x10 1# Prone 2x10 1# 3/2-added   3/9 NP'd resume NPV    Lower Trap  Prone 3x10 3/7-Added   3/9-NP'd resume NPV    Biceps    Triceps     Retraction          Cable Column/Theraband     External Rotation    Internal Rotation 2/28-Eccentric Focus    Shrugs     Lats     Ext  3/9-Added; back of wrist on handle    Flex     Scapular Retraction    BIC     TRIC     PNF Dynamic Stability          Plyoback          Manual Flex, ABD, IR, ER 10' with oscillations (grade I) Added 2/23   Prone Latissimus STM    3/9: with scap mobs to improve UE function    IASTM anterolateral shoulder  3/7           Access Code: Cherie Mueller  URL: [email or web address]  Date: 02/08/2022  Prepared by: Keysha Gallegos Cessna    Exercises  Seated Gripping Towel - 2 x daily - 7 x weekly - 3 reps - 1 sets - 2-3 hold  Seated Elbow Flexion AAROM - 2 x daily - 7 x weekly - 10 sets - 10 hold  Seated Scapular Retraction - 2 x daily - 7 x weekly - 10 reps - 10 hold  Seated Shoulder Shrugs - 2 x daily - 7 x weekly - 10 reps - 3 sets  Seated Shoulder Flexion Towel Slide at Table Top - 2 x daily - 7 x weekly - 10 reps - 10 hold  Seated Shoulder Scaption Slide at Table Top with Forearm in Neutral - 2 x daily - 7 x weekly - 10 reps - 10 hold  Seated Shoulder External Rotation AAROM with Cane and Hand in Neutral - 2 x daily - 7 x weekly - 10 reps - 10 hold    2/16/22: Deneise Couch, PTA   Supine Shoulder External Rotation in 45 Degrees Abduction AAROM with Dowel - 2 x daily - 7 x weekly - 10 reps - 10 hold  Supine Shoulder Flexion Extension AAROM with Dowel - 2 x daily - 7 x weekly - 10 reps - 10 hold  Standing Shoulder Internal Rotation Stretch with Towel - 2 x daily - 7 x weekly - 10 reps - 10 hold  Supine Scapular Protraction in Flexion with Dumbbells - 1 x daily - 7 x weekly - 3 sets - 10 reps    Access Code: Cherie Mueller  URL: [email or web address]  Date: 03/02/2022  Prepared by: Stephanie Godoy  Exercises  Shoulder Flexion Wall Slide with Towel - 1 x daily - 7 x weekly - 3 sets - 10 reps  Standing Shoulder Abduction Slides at Wall - 1 x daily - 7 x weekly - 3 sets - 10 reps  Seated Scapular Retraction - 2 x daily - 7 x weekly - 10 reps - 10 hold  Seated Shoulder Shrugs - 2 x daily - 7 x weekly - 10 reps - 3 sets  Supine Shoulder External Rotation in 45 Degrees Abduction AAROM with Dowel - 2 x daily - 7 x weekly - 10 reps - 10 hold  Standing Shoulder Internal Rotation Stretch with Towel - 2 x daily - 7 x weekly - 10 reps - 10 hold  Supine Scapular Protraction in Flexion with Dumbbells - 1 x daily - 7 x weekly - 3 sets - 10 reps  Sidelying Shoulder External Rotation with Dumbbell - 1 x daily - 7 x weekly - 3 sets - 10 reps  Prone Shoulder Extension - Single Arm with Dumbbell - 1 x daily - 7 x weekly - 3 sets - 10 reps  Prone Single Arm Shoulder Horizontal Abduction with Dumbbell - Palm Down - 1 x daily - 7 x weekly - 3 sets - 10 reps          Therapeutic Exercise and NMR EXR  [x] (50050) Provided verbal/tactile cueing for activities related to strengthening, flexibility, endurance, ROM  for improvements in scapular, scapulothoracic and UE control with self care, reaching, carrying, lifting, house/yardwork, driving/computer work. [x] (94828) Provided verbal/tactile cueing for activities related to improving balance, coordination, kinesthetic sense, posture, motor skill, proprioception  to assist with  scapular, scapulothoracic and UE control with self care, reaching, carrying, lifting, house/yardwork, driving/computer work. Therapeutic Activities:    [x] (98012 or 20784) Provided verbal/tactile cueing for activities related to improving balance, coordination, kinesthetic sense, posture, motor skill, proprioception and motor activation to allow for proper function of scapular, scapulothoracic and UE control with self care, carrying, lifting, driving/computer work.      Home Exercise Program:    [x] (17488) Reviewed/Progressed HEP activities related to strengthening, flexibility, endurance, ROM of scapular, scapulothoracic and UE control with self care, reaching, carrying, lifting, house/yardwork, driving/computer work  [] (61549) Reviewed/Progressed HEP activities related to improving balance, coordination, kinesthetic sense, posture, motor skill, proprioception of scapular, scapulothoracic and UE control with self care, reaching, carrying, lifting, house/yardwork, driving/computer work      Manual Treatments:  PROM / STM / Oscillations-Mobs:  G-I, II, III, IV (PA's, Inf., Post.)  [x] (18122) Provided manual therapy to mobilize soft tissue/joints of cervical/CT, scapular GHJ and UE for the purpose of modulating pain, promoting relaxation,  increasing ROM, reducing/eliminating soft tissue swelling/inflammation/restriction, improving soft tissue extensibility and allowing for proper ROM for normal function with self care, reaching, carrying, lifting, house/yardwork, driving/computer work    Pt Education: Patient education on PT and plan of care including diagnosis, prognosis, treatment goals and options. Patient agrees with discussed POC and treatment and is aware of rehab process. Pt was also educated on clinic layout and use of modalities. PT gave pt business card to call if any questions. Pt was ed on sling use, wound care, self care, dressing, showering, donning/doffing sling, and protocol. Pt was ed on S&S of infection and what to do if these are experienced. Modalities: declined    Charges:   Timed Code Treatment Minutes: 39'   Total Treatment Minutes: 39'     [] EVAL (LOW) 455 1011   [] EVAL (MOD) 13892   [] EVAL (HIGH) 52185   [] RE-EVAL   [x] CU(46559) x  1   [] IONTO  [x] NMR (43098) x  1   [] VASO  [x] Manual (17589) x 1     [] Other:  [] TA x  1    [] Mech Traction (06554)  [] ES(attended) (82772)      [] ES (un) (59155):     GOALS:   Patient stated goal: return to normal activity    [x] Progressing: [] Met: [] Not Met: [] Adjusted     Therapist goals for Patient:   Short Term Goals: To be achieved in: 2 weeks  1. Independent in HEP and progression per patient tolerance, in order to prevent re-injury. [] Progressing: [x] Met: [] Not Met: [] Adjusted   2. Patient will report pain at worst less than or equal to 4/10. [] Progressing: [x] Met: [] Not Met: [] Adjusted     Long Term Goals:  To be achieved in: 16 weeks  1. Pt will demo UEFI of 90% or better to assist with reaching prior level of function. [x] Progressing: [] Met: [] Not Met: [] Adjusted  2. Patient will demonstrate increased AROM to shoulder flex greater than or equal to 170, ABD greater than or equal to 170, ER greater than or equal to 80, IR greater than or equal to 50 to allow for proper joint functioning as indicated by patients Functional Deficits. [x] Progressing: [] Met: [] Not Met: [] Adjusted  3. Patient will demonstrate an increase in strength to shoulder flex greater than or equal to 4+, ABD greater than or equal to 4+, ER greater than or equal to 4, IR greater than or equal to 4+ to allow for proper functional mobility as indicated by patients functional deficits. [x] Progressing: [] Met: [] Not Met: [] Adjusted  4. Patient will return to performing all self care without increased symptoms or restriction. [x] Progressing: [] Met: [] Not Met: [] Adjusted  5. Pt will report pain at worst less than or equal to 2/10. [x] Progressing: [] Met: [] Not Met: [] Adjusted  6. Pt will return to his normal workout routine. [x] Progressing: [] Met: [] Not Met: [] Adjusted           Overall Progression Towards Functional goals/ Treatment Progress Update:  [x] Patient is progressing as expected towards functional goals listed. [] Progression is slowed due to complexities/Impairments listed. [] Progression has been slowed due to co-morbidities.   [] Plan just implemented, too soon to assess goals progression <30days   [] Goals require adjustment due to lack of progress  [] Patient is not progressing as expected and requires additional follow up with physician  [] Other    Prognosis for POC: [x] Good [] Fair  [] Poor      Patient requires continued skilled intervention: [x] Yes  [] No    Treatment/Activity Tolerance:  [x] Patient able to complete treatment  [] Patient limited by fatigue  [] Patient limited by pain     [] Patient limited by other medical complications  [] Other: Patient tolerated treatment well. Continued with scapular blocking with PROM this date and tissue restrictions continue to decrease throughout manual interventions. Prone periscapular strengthening was resumed with intermittent tactile cueing provided to improve scapular mechanics and proprioception in an attempt to decrease functional limitations present with activities above shoulder height. Appropriate fatigue expressed in posterior shoulder this date and progressions with resistance and volume listed in bold in ex log. Lift off added with wall slide to further improve lower trap and RTC activation and posterior shoulder stability. Plan to incorporate bicep strengthening NPV to progress pt appropriately through post op protocol. Recommend continuing tx 2x/wk x 6-8 wks to address ROM, strength, function. PLAN: If pt doesn't return, this note can be considered a D/C note.   Add biceps at 6 wks post op  [x] Continue per plan of care [] Alter current plan (see comments above)  [] Plan of care initiated [] Hold pending MD visit [] Discharge    Electronically signed by: Bismark Fuller, \[facility] [place] [facility]\"", [id number]

## 2022-03-16 ENCOUNTER — CARE COORDINATION (OUTPATIENT)
Dept: CASE MANAGEMENT | Age: 47
End: 2022-03-16

## 2022-03-16 NOTE — CARE COORDINATION
[facility] Transitions Follow Up Call    3/16/2022    Patient: David Rai  Patient :    MRN: [id number]  Reason for Admission: PE  Discharge Date: 22 RARS: Readmission Risk Score: 5.4 ( )         Spoke with: LORENA    Final attempt to reach patient via phone for transition call. VM left stating purpose of call along with my contact information requesting a return call. Episode ended d/t unsuccessful contact. Qamar Devine LPN [address] [place] [address]  Care Transitions  [phone]      Care Transitions Subsequent and Final Call    Subsequent and Final Calls  Care Transitions Interventions  Other Interventions:            Follow Up  Future Appointments   Date Time Provider Christian Beebe   3/17/2022  8:30 AM OBDULIA Young [address] [facility]   3/21/2022 10:45 AM Justin Angela PTA [facility]   3/23/2022  9:15 AM Justin Angela PTA [facility]   2022  9:15 AM Hood [facility]   2022 10:45 AM Sam Guo MD [facility]       Qamar Devine LPN

## 2022-03-17 ENCOUNTER — HOSPITAL ENCOUNTER (OUTPATIENT)
Dept: PHYSICAL THERAPY | Age: 47
Setting detail: THERAPIES SERIES
Discharge: HOME | End: 2022-03-17
Payer: OTHER MISCELLANEOUS

## 2022-03-17 PROCEDURE — 97140 MANUAL THERAPY 1/> REGIONS: CPT | Performed by: PHYSICAL THERAPIST

## 2022-03-17 PROCEDURE — 97110 THERAPEUTIC EXERCISES: CPT | Performed by: PHYSICAL THERAPIST

## 2022-03-17 PROCEDURE — 97112 NEUROMUSCULAR REEDUCATION: CPT | Performed by: PHYSICAL THERAPIST

## 2022-03-17 NOTE — FLOWSHEET NOTE
RESTRICTIONS/PRECAUTIONS: full motion of shoulder. NWB. 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 Exercises/Interventions:   Exercise/Equipment Resistance/Repetitions Other comments   Aerobic Conditioning     Aerodyne          Stretching/PROM     Supine Wand      Table Slides Wall slides  Walks 10x:10 each  /ABD  Scaption with lift off   1x15  2/28-Initiated   3/14-Lift off added in plane of scaption    Wall Slide to ECC ABD  1x20  3/2-Flexion wall slide to ECC lower into ABD    UE [place] Ext with ant shoulder blocking  10x:10  3/7-Wall to block ant translation   1/2 Kneeling Latissimus stretch with dynaband  CC \"Doorway Hang\" Lat Stretch  5x:15  R hand blocking L scap 3/14: Added    UE [place] ER at neutral and 45  2/23-90 added   In supine    AAROM ABD  [place] 10x:10    Pulleys Flex/scap   Pendulum     BB IR 10x:10 UE [place]     SL IR          Pec doorway stretch     CBA stretch     UT stretch     LS stretch          Isometrics     Retraction     Scap Stabs on wall Up/Down, Side/side, CW/CCW 2x:20 each 1 kg Ball      Weight shift     SA Roller Wall Slide      Flexion Abduction External Rotation Internal Rotation Biceps     Triceps          PRE's     Flexion     Abduction     External Rotation S/L 3x10 2#    Internal Rotation     Shrugs    EXT     Reverse Flys     Serratus 4# 3x10  Weighted dowel   Inc resistance NPV   Horiz ABD   Horizontal Abd with ER Prone 3x10 1# Prone 3x10 1#    Lower Trap  Prone 3x10    Biceps 1# 3x10   Triceps     Retraction          Cable Column/Theraband     External Rotation    Internal Rotation 2/28-Eccentric Focus    Shrugs     Lats     Ext  3/9-Added; back of wrist on handle    Flex     Scapular Retraction    BIC     TRIC     PNF          Dynamic Stability          Plyoback          Manual Flex, ABD, IR, ER 10' with oscillations (grade I)   Prone Latissimus STM      IASTM anterolateral shoulder           Access Code: Gerry Obrien  URL: [email or web address]  Date: 02/08/2022  Prepared by: Grace Stuart    Exercises  Seated Gripping Towel - 2 x daily - 7 x weekly - 3 reps - 1 sets - 2-3 hold  Seated Elbow Flexion AAROM - 2 x daily - 7 x weekly - 10 sets - 10 hold  Seated Scapular Retraction - 2 x daily - 7 x weekly - 10 reps - 10 hold  Seated Shoulder Shrugs - 2 x daily - 7 x weekly - 10 reps - 3 sets  Seated Shoulder Flexion Towel Slide at Table Top - 2 x daily - 7 x weekly - 10 reps - 10 hold  Seated Shoulder Scaption Slide at Table Top with Forearm in Neutral - 2 x daily - 7 x weekly - 10 reps - 10 hold  Seated Shoulder External Rotation AAROM with Cane and Hand in Neutral - 2 x daily - 7 x weekly - 10 reps - 10 hold    2/16/22: Barrington Méndez, EDMOND   Supine Shoulder External Rotation in 45 Degrees Abduction AAROM with Dowel - 2 x daily - 7 x weekly - 10 reps - 10 hold  Supine Shoulder Flexion Extension AAROM with Dowel - 2 x daily - 7 x weekly - 10 reps - 10 hold  Standing Shoulder Internal Rotation Stretch with Towel - 2 x daily - 7 x weekly - 10 reps - 10 hold  Supine Scapular Protraction in Flexion with Dumbbells - 1 x daily - 7 x weekly - 3 sets - 10 reps    Access Code: Arabella Cruz  URL: [email or web address]  Date: 03/02/2022  Prepared by: Pete Morales  Exercises  Shoulder Flexion Wall Slide with Towel - 1 x daily - 7 x weekly - 3 sets - 10 reps  Standing Shoulder Abduction Slides at Wall - 1 x daily - 7 x weekly - 3 sets - 10 reps  Seated Scapular Retraction - 2 x daily - 7 x weekly - 10 reps - 10 hold  Seated Shoulder Shrugs - 2 x daily - 7 x weekly - 10 reps - 3 sets  Supine Shoulder External Rotation in 45 Degrees Abduction AAROM with Dowel - 2 x daily - 7 x weekly - 10 reps - 10 hold  Standing Shoulder Internal Rotation Stretch with Towel - 2 x daily - 7 x weekly - 10 reps - 10 hold  Supine Scapular Protraction in Flexion with Dumbbells - 1 x daily - 7 x weekly - 3 sets - 10 reps  Sidelying Shoulder External Rotation with Dumbbell - 1 x daily - 7 x modulating pain, promoting relaxation,  increasing ROM, reducing/eliminating soft tissue swelling/inflammation/restriction, improving soft tissue extensibility and allowing for proper ROM for normal function with self care, reaching, carrying, lifting, house/yardwork, driving/computer work    Pt Education: Patient education on PT and plan of care including diagnosis, prognosis, treatment goals and options. Patient agrees with discussed POC and treatment and is aware of rehab process. Pt was also educated on clinic layout and use of modalities. PT gave pt business card to call if any questions. Pt was ed on sling use, wound care, self care, dressing, showering, donning/doffing sling, and protocol. Pt was ed on S&S of infection and what to do if these are experienced. Modalities: declined    Charges:   Timed Code Treatment Minutes: 39'   Total Treatment Minutes: 39'     [] EVAL (LOW) 455 1011   [] EVAL (MOD) 31861   [] EVAL (HIGH) 60074   [] RE-EVAL   [x] NM(57824) x  1   [] IONTO  [x] NMR (71659) x  1   [] VASO  [x] Manual (69248) x 1     [] Other:  [] TA x  1    [] Mech Traction (11585)  [] ES(attended) (93237)      [] ES (un) (07657):     GOALS:   Patient stated goal: return to normal activity    [x] Progressing: [] Met: [] Not Met: [] Adjusted     Therapist goals for Patient:   Short Term Goals: To be achieved in: 2 weeks  1. Independent in HEP and progression per patient tolerance, in order to prevent re-injury. [] Progressing: [x] Met: [] Not Met: [] Adjusted   2. Patient will report pain at worst less than or equal to 4/10. [] Progressing: [x] Met: [] Not Met: [] Adjusted     Long Term Goals: To be achieved in: 16 weeks  1. Pt will demo UEFI of 90% or better to assist with reaching prior level of function. [x] Progressing: [] Met: [] Not Met: [] Adjusted  2.  Patient will demonstrate increased AROM to shoulder flex greater than or equal to 170, ABD greater than or equal to 170, ER greater than or equal to 80, IR greater than or equal to 50 to allow for proper joint functioning as indicated by patients Functional Deficits. [x] Progressing: [] Met: [] Not Met: [] Adjusted  3. Patient will demonstrate an increase in strength to shoulder flex greater than or equal to 4+, ABD greater than or equal to 4+, ER greater than or equal to 4, IR greater than or equal to 4+ to allow for proper functional mobility as indicated by patients functional deficits. [x] Progressing: [] Met: [] Not Met: [] Adjusted  4. Patient will return to performing all self care without increased symptoms or restriction. [x] Progressing: [] Met: [] Not Met: [] Adjusted  5. Pt will report pain at worst less than or equal to 2/10. [x] Progressing: [] Met: [] Not Met: [] Adjusted  6. Pt will return to his normal workout routine. [x] Progressing: [] Met: [] Not Met: [] Adjusted           Overall Progression Towards Functional goals/ Treatment Progress Update:  [x] Patient is progressing as expected towards functional goals listed. [] Progression is slowed due to complexities/Impairments listed. [] Progression has been slowed due to co-morbidities. [] Plan just implemented, too soon to assess goals progression <30days   [] Goals require adjustment due to lack of progress  [] Patient is not progressing as expected and requires additional follow up with physician  [] Other    Prognosis for POC: [x] Good [] Fair  [] Poor      Patient requires continued skilled intervention: [x] Yes  [] No    Treatment/Activity Tolerance:  [x] Patient able to complete treatment  [] Patient limited by fatigue  [] Patient limited by pain     [] Patient limited by other medical complications  [] Other: Motion w/ min tightness at end range of L shoulder. Scapular control progressing but concentration needed for retraction prior to lift off from table. Cuing needed int to avoid UT substitution as fatigue occurred.  Pt has good understanding of program and restrictions. Added bicep contraction today w/ good tech and fatigue but no adverse effects. Winging noted in L scapula w/ prone series. PLAN: If pt doesn't return, this note can be considered a D/C note.   Add biceps at 6 wks post op  [x] Continue per plan of care [] Alter current plan (see comments above)  [] Plan of care initiated [] Hold pending MD visit [] Discharge    Electronically signed by: Dina Croft PT, Fatou Humphries 88, OMT-C    Physical Therapist [id number] [address] license #[id number]  Physical Therapist [place] license #[id number]

## 2022-03-21 ENCOUNTER — HOSPITAL ENCOUNTER (OUTPATIENT)
Dept: PHYSICAL THERAPY | Age: 47
Setting detail: THERAPIES SERIES
Discharge: HOME | End: 2022-03-21
Payer: OTHER MISCELLANEOUS

## 2022-03-21 PROCEDURE — 97140 MANUAL THERAPY 1/> REGIONS: CPT

## 2022-03-21 PROCEDURE — 97110 THERAPEUTIC EXERCISES: CPT

## 2022-03-21 PROCEDURE — 97112 NEUROMUSCULAR REEDUCATION: CPT

## 2022-03-21 NOTE — FLOWSHEET NOTE
[facility] a Department of [facility]     [address]  Office: [phone]  Fax:  [phone]        Physical Therapy Treatment Note/ Progress Report:           Date:  3/21/2022    Patient Name:  Enrique Villafuerte    :    MRN: [id number]  Restrictions/Precautions:    Medical/Treatment Diagnosis Information:  · Diagnosis: s/p left shoulder biceps tenodesis. Surgery on 22;  M25.512- left shoulder pain  · Treatment Diagnosis: M25.512- left shoulder pain  Insurance/Certification information:  PT Insurance Information: Katarina Collins  Physician Information:  Referring Practitioner: Any Be  Has the plan of care been signed (Y/N):        []  Yes  [x]  No     Date of Patient follow up with Physician:       Is this a Progress Report:     [x]  Yes  []  No        If Yes:  Date Range for reporting period:  Beginning 2022  Ending 9 Mar 22    Progress report will be due (19 Rx or 30 days whichever is less): 2022      Visit # Insurance Allowable Auth Required   12 auto []  Yes [x]  No        Functional Scale: UEFI-8.75%   Date assessed: 2022  UEFI: raw score: 72; function: 90%   3/9/2022    Pain level: 1-2/10    SUBJECTIVE: 6+ weeks s/p surg  Pt reports good tolerance to previous session and has gradually increased activity at gym with \"slow and controlled\" movements without adverse affects experienced. Pt continues to report most symptoms experienced when waking up after laying on L shoulder during sleep.        OBJECTIVE:   Observation:     Test measurements:     3/21/22: AROM L shoulder: ABD: 166 degrees      3/9/22  ROM PROM AROM  Comment    L R L R    Flexion 170 supine   160    Abduction 165 supine   156    ER 85 @ 90 manual 95 @90  T1    IR 80 @90  Bridgette[employer]  T9     Other: Scaption        Other             Strength L R Comment   Flexion      Abduction      ER      IR      Supraspinatus Upper Trap      Lower Trap      Mid Trap      Rhomboids      Biceps      Triceps      Horizontal Abduction      Horizontal Adduction      Lats          RESTRICTIONS/PRECAUTIONS: full motion of shoulder. NWB. 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         Exercises/Interventions:   Exercise/Equipment Resistance/Repetitions Other comments   Aerobic Conditioning     Aerodyne          Stretching/PROM     Supine Wand      Table Slides Wall slides  Walks 10x:10 each  /ABD  Scaption with lift off   1x15  2/28-Initiated   3/14-Lift off added in plane of scaption    Wall Slide to ECC ABD  1x20  3/2-Flexion wall slide to ECC lower into ABD    UE [place] Ext with ant shoulder blocking  10x:10  3/7-Wall to block ant translation   1/2 Kneeling Latissimus stretch with dynaband  CC \"Doorway Hang\" Lat Stretch  5x:15  R hand blocking L scap 3/14: Added    UE [place] ER at neutral and 45  2/23-90 added   In supine    AAROM ABD  [place] 10x:10    Pulleys Flex/scap   Pendulum     BB IR 10x:10 UE [place]     SL IR          Pec doorway stretch     CBA stretch     UT stretch     LS stretch          Isometrics     Retraction     Scap Stabs on wall Up/Down, Side/side, CW/CCW 2x:20 each 1 kg Ball      Weight shift     SA Roller Wall Slide      Flexion Abduction External Rotation Internal Rotation Biceps     Triceps          PRE's     Flexion     Abduction     External Rotation S/L 2x10 3#    Internal Rotation     Shrugs    EXT     Reverse Flys     Serratus 4# 2x10  Single Arm  3/21-SA added    Serratus  Lift  Red Tband Lift to shoulder height; 2x10  3/21-Mirror for visual feedback; band at wrist    Horiz ABD   Horizontal Abd with ER Prone 3x10 1 Kg Ball Prone 3x10 1 Kg Ball    Lower Trap  Prone 3x10 500g Ball    Biceps 5# 2x10   Triceps     Retraction          Cable Column/Theraband     External Rotation    Internal Rotation 2/28-Eccentric Focus    Shrugs     Lats     Ext  3/9-Added; back of wrist on handle    Flex     Scapular Retraction 1x20 Black    BIC     TRIC     PNF          Dynamic Stability     Rhythmic Stabs  Supine 1Kg Ball 4x:20   S/L 0# 3x:20  3/21: Manual Resistance              Plyoback          Manual Flex, ABD, IR, ER 8' with oscillations (grade I)   Prone Latissimus STM      IASTM anterolateral shoulder           Access Code: Ion Turner  URL: [email or web address]  Date: 02/08/2022  Prepared by: Julien Stuart    Exercises  Seated Gripping Towel - 2 x daily - 7 x weekly - 3 reps - 1 sets - 2-3 hold  Seated Elbow Flexion AAROM - 2 x daily - 7 x weekly - 10 sets - 10 hold  Seated Scapular Retraction - 2 x daily - 7 x weekly - 10 reps - 10 hold  Seated Shoulder Shrugs - 2 x daily - 7 x weekly - 10 reps - 3 sets  Seated Shoulder Flexion Towel Slide at Table Top - 2 x daily - 7 x weekly - 10 reps - 10 hold  Seated Shoulder Scaption Slide at Table Top with Forearm in Neutral - 2 x daily - 7 x weekly - 10 reps - 10 hold  Seated Shoulder External Rotation AAROM with Cane and Hand in Neutral - 2 x daily - 7 x weekly - 10 reps - 10 hold    2/16/22: Luly Come, PTA   Supine Shoulder External Rotation in 45 Degrees Abduction AAROM with Dowel - 2 x daily - 7 x weekly - 10 reps - 10 hold  Supine Shoulder Flexion Extension AAROM with Dowel - 2 x daily - 7 x weekly - 10 reps - 10 hold  Standing Shoulder Internal Rotation Stretch with Towel - 2 x daily - 7 x weekly - 10 reps - 10 hold  Supine Scapular Protraction in Flexion with Dumbbells - 1 x daily - 7 x weekly - 3 sets - 10 reps    Access Code: Ion Turner  URL: [email or web address]  Date: 03/02/2022  Prepared by: Chiqui Velarde  Exercises  Shoulder Flexion Wall Slide with Towel - 1 x daily - 7 x weekly - 3 sets - 10 reps  Standing Shoulder Abduction Slides at Wall - 1 x daily - 7 x weekly - 3 sets - 10 reps  Seated Scapular Retraction - 2 x daily - 7 x weekly - 10 reps - 10 hold  Seated Shoulder Shrugs - 2 x daily - 7 x weekly - 10 reps - 3 sets  Supine Shoulder External Rotation in 45 Degrees Abduction AAROM with Dowel - 2 x daily - 7 x weekly - 10 reps - 10 hold  Standing Shoulder Internal Rotation Stretch with Towel - 2 x daily - 7 x weekly - 10 reps - 10 hold  Supine Scapular Protraction in Flexion with Dumbbells - 1 x daily - 7 x weekly - 3 sets - 10 reps  Sidelying Shoulder External Rotation with Dumbbell - 1 x daily - 7 x weekly - 3 sets - 10 reps  Prone Shoulder Extension - Single Arm with Dumbbell - 1 x daily - 7 x weekly - 3 sets - 10 reps  Prone Single Arm Shoulder Horizontal Abduction with Dumbbell - Palm Down - 1 x daily - 7 x weekly - 3 sets - 10 reps      Therapeutic Exercise and NMR EXR  [x] (45005) Provided verbal/tactile cueing for activities related to strengthening, flexibility, endurance, ROM  for improvements in scapular, scapulothoracic and UE control with self care, reaching, carrying, lifting, house/yardwork, driving/computer work. [x] (21753) Provided verbal/tactile cueing for activities related to improving balance, coordination, kinesthetic sense, posture, motor skill, proprioception  to assist with  scapular, scapulothoracic and UE control with self care, reaching, carrying, lifting, house/yardwork, driving/computer work. Therapeutic Activities:    [x] (34939 or 32727) Provided verbal/tactile cueing for activities related to improving balance, coordination, kinesthetic sense, posture, motor skill, proprioception and motor activation to allow for proper function of scapular, scapulothoracic and UE control with self care, carrying, lifting, driving/computer work.      Home Exercise Program:    [x] (39335) Reviewed/Progressed HEP activities related to strengthening, flexibility, endurance, ROM of scapular, scapulothoracic and UE control with self care, reaching, carrying, lifting, house/yardwork, driving/computer work  [] (19826) Reviewed/Progressed HEP activities related to improving balance, coordination, kinesthetic sense, posture, motor skill, proprioception of scapular, scapulothoracic and UE control with self care, reaching, carrying, lifting, house/yardwork, driving/computer work      Manual Treatments:  PROM / STM / Oscillations-Mobs:  G-I, II, III, IV (PA's, Inf., Post.)  [x] (08246) Provided manual therapy to mobilize soft tissue/joints of cervical/CT, scapular GHJ and UE for the purpose of modulating pain, promoting relaxation,  increasing ROM, reducing/eliminating soft tissue swelling/inflammation/restriction, improving soft tissue extensibility and allowing for proper ROM for normal function with self care, reaching, carrying, lifting, house/yardwork, driving/computer work    Pt Education: Patient education on PT and plan of care including diagnosis, prognosis, treatment goals and options. Patient agrees with discussed POC and treatment and is aware of rehab process. Pt was also educated on clinic layout and use of modalities. PT gave pt business card to call if any questions. Pt was ed on sling use, wound care, self care, dressing, showering, donning/doffing sling, and protocol. Pt was ed on S&S of infection and what to do if these are experienced. Modalities: declined    Charges:   Timed Code Treatment Minutes: 39'   Total Treatment Minutes: 39'     [] EVAL (LOW) 455 [id number]   [] EVAL (MOD) 49755   [] EVAL (HIGH) 67612   [] RE-EVAL   [x] KO(34181) x  1   [] IONTO  [x] NMR (43017) x  1   [] VASO  [x] Manual (08948) x 1     [] Other:  [] TA x  1    [] [facility] (29450[facility]  [] ES(attended) (55932)      [] ES (un) (61633):     GOALS:   Patient stated goal: return to normal activity    [x] Progressing: [] Met: [] Not Met: [] Adjusted     Therapist goals for Patient:   Short Term Goals: To be achieved in: 2 weeks  1. Independent in HEP and progression per patient tolerance, in order to prevent re-injury. [] Progressing: [x] Met: [] Not Met: [] Adjusted   2. Patient will report pain at worst less than or equal to 4/10.   [] Progressing: [x] Met: [] Not Met: [] Adjusted     Long Term Goals: To be achieved in: 16 weeks  1. Pt will demo UEFI of 90% or better to assist with reaching prior level of function. [x] Progressing: [] Met: [] Not Met: [] Adjusted  2. Patient will demonstrate increased AROM to shoulder flex greater than or equal to 170, ABD greater than or equal to 170, ER greater than or equal to 80, IR greater than or equal to 50 to allow for proper joint functioning as indicated by patients Functional Deficits. [x] Progressing: [] Met: [] Not Met: [] Adjusted  3. Patient will demonstrate an increase in strength to shoulder flex greater than or equal to 4+, ABD greater than or equal to 4+, ER greater than or equal to 4, IR greater than or equal to 4+ to allow for proper functional mobility as indicated by patients functional deficits. [x] Progressing: [] Met: [] Not Met: [] Adjusted  4. Patient will return to performing all self care without increased symptoms or restriction. [x] Progressing: [] Met: [] Not Met: [] Adjusted  5. Pt will report pain at worst less than or equal to 2/10. [x] Progressing: [] Met: [] Not Met: [] Adjusted  6. Pt will return to his normal workout routine. [x] Progressing: [] Met: [] Not Met: [] Adjusted           Overall Progression Towards Functional goals/ Treatment Progress Update:  [x] Patient is progressing as expected towards functional goals listed. [] Progression is slowed due to complexities/Impairments listed. [] Progression has been slowed due to co-morbidities.   [] Plan just implemented, too soon to assess goals progression <30days   [] Goals require adjustment due to lack of progress  [] Patient is not progressing as expected and requires additional follow up with physician  [] Other    Prognosis for POC: [x] Good [] Fair  [] Poor      Patient requires continued skilled intervention: [x] Yes  [] No    Treatment/Activity Tolerance:  [x] Patient able to complete treatment  [] Patient limited by fatigue  [] Patient limited by pain     [] Patient limited by other medical complications  [] Other: Pt demo's improved AROM this date as evident by objective measures with improved scapular mechanics expressed throughout active motion. Progressed GHJ stability with supine and S/L rhythmic stabs performed to increase RTC facilitation in multiple planes. Able to progress UE resistance and challenge throughout exercise Rx without increase in baseline symptoms and able to maintain appropriate UE mechanics without compensations. Utilized dynaballs to challenge  as pt is 6+ weeks post op to progress pt appropriate through protocol. Assess tolerance to increased exercise Rx and progress UE strength and stability, as tolerated to progress towards LTG's. PLAN: If pt doesn't return, this note can be considered a D/C note.   Add biceps at 6 wks post op  [x] Continue per plan of care [] Alter current plan (see comments above)  [] Plan of care initiated [] Hold pending MD visit [] Discharge    Electronically signed by: ALISSA Demarco,[id number]

## 2022-03-23 ENCOUNTER — HOSPITAL ENCOUNTER (OUTPATIENT)
Dept: PHYSICAL THERAPY | Age: 47
Setting detail: THERAPIES SERIES
Discharge: HOME | End: 2022-03-23
Payer: OTHER MISCELLANEOUS

## 2022-03-23 PROCEDURE — 97112 NEUROMUSCULAR REEDUCATION: CPT

## 2022-03-23 PROCEDURE — 97110 THERAPEUTIC EXERCISES: CPT

## 2022-03-23 PROCEDURE — 97530 THERAPEUTIC ACTIVITIES: CPT

## 2022-03-23 PROCEDURE — 97140 MANUAL THERAPY 1/> REGIONS: CPT

## 2022-03-23 NOTE — FLOWSHEET NOTE
[facility] Performance and Rehabilitation a Department of [address]  Meghan Carlson 9[address], [address]  Office: [phone]  Fax:  [phone]        Physical Therapy Treatment Note/ Progress Report:           Date:  3/23/2022    Patient Name:  Claudia Ahumada    :  [birth year]  MRN: [id number]  Restrictions/Precautions:    Medical/Treatment Diagnosis Information:  · Diagnosis: s/p left shoulder biceps tenodesis. Surgery on 22;  M25.512- left shoulder pain  · Treatment Diagnosis: M25.512- left shoulder pain  Insurance/Certification information:  PT Insurance Information: Kyle Contreras  Physician Information:  Referring Practitioner: Shane London  Has the plan of care been signed (Y/N):        []  Yes  [x]  No     Date of Patient follow up with Physician:       Is this a Progress Report:     [x]  Yes  []  No        If Yes:  Date Range for reporting period:  Beginning 2022  Ending 9 Mar 22    Progress report will be due (19 Rx or 30 days whichever is less): 2022      Visit # Insurance Allowable Auth Required   13 auto []  Yes [x]  No        Functional Scale: UEFI-8.75%   Date assessed: 2022  UEFI: raw score: 72; function: 90%   3/9/2022    Pain level: 0-1/10    SUBJECTIVE: 7 weeks s/p surg  Pt reports appropriate soreness through tricep musculature after previous visits. Continues to gradually increase activity in the gym with good tolerance expressed with long axis lat pull down completed yesterday along with TM walking on an incline.          OBJECTIVE:   Observation:     Test measurements:     3/21/22: AROM L shoulder: ABD: 166 degrees    3/23/22: B UE BB ROM: 15cm distance with L IR; 5cm distance with L ER     3/9/22  ROM PROM AROM  Comment    L R L R    Flexion 170 supine   160    Abduction 165 supine   156    ER 85 @ 90 manual 95 @90  T1    IR 80 @90  [email or web address]  T9     Other: Scaption        Other             Strength L R Comment   Flexion Abduction      ER      IR      Supraspinatus      Upper Trap      Lower Trap      Mid Trap      Rhomboids      Biceps      Triceps      Horizontal Abduction      Horizontal Adduction      Lats          RESTRICTIONS/PRECAUTIONS: full motion of shoulder. NWB. 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         Exercises/Interventions:   Exercise/Equipment Resistance/Repetitions Other comments   Aerobic Conditioning     Aerodyne          Stretching/PROM     Supine Wand      Table Slides Wall slides  Walks 10x:10 each  /ABD  Scaption with lift off   2x15  2/28-Initiated   3/14-Lift off added in plane of scaption    Wall Slide to ECC ABD  1x20  3/2-Flexion wall slide to ECC lower into ABD    UE Aurora Ext with ant shoulder blocking  10x:10  3/7-Wall to block ant translation   1/2 Kneeling Latissimus stretch with dynaband  CC \"Doorway Hang\" Lat Stretch  5x:15  R hand blocking L scap 3/14: Added    UE Aurora ER at neutral and 45  2/23-90 added   In supine    AAROM ABD      Pulleys Flex/scap   Pendulum     BB IR 10x:10 UE Aurora     SL IR          Pec doorway stretch     CBA stretch     UT stretch     LS stretch          Isometrics     Retraction     Scap Stabs on wall Up/Down, Side/side, CW/CCW 2x:20 each 1 kg Ball      Weight shift     SA Roller Wall Slide      Flexion Abduction External Rotation Internal Rotation Biceps     Triceps          PRE's     Flexion     Abduction     External Rotation    Internal Rotation     Shrugs    EXT     Reverse Flys     Serratus   3/21-SA added    Serratus  Lift  Red Tband Lift to shoulder height; 3x10  3/21-Mirror for visual feedback; band at wrist    Horiz ABD   Horizontal Abd with ER Prone 2x10 3# DB Prone 2x10 3# DB    Lower Trap  Prone 3x10 500g Ball    Biceps 5# 3x15    Triceps     Hi/Lo Push ups to +  2x6  3/23- 30\" table height    Retraction          Cable Column/Theraband     External Rotation 3x10 Green    Internal Rotation 2/28-Eccentric Focus    Shrugs 15# B UE's 2x10  3/23   Lats Ext  3/9-Added; back of wrist on handle    Flex     High [place] [address]  Unilateral 70# 2x10 R/L 3/23    Scapular Retraction 2x20 Black    BIC     TRIC     PNF          Dynamic Stability     Rhythmic Stabs  Supine 1Kg Ball 4x:20   S/L 0# 3x:20  3/21: Manual Resistance              Plyoback          Manual Flex, ABD, IR, ER 8' with oscillations (grade I)   Prone Latissimus STM      IASTM anterolateral shoulder           Access Code: Caterina Marcos  URL: [email or web address]  Date: 02/08/2022  Prepared by: Yuri Stuart    Exercises  Seated Gripping Towel - 2 x daily - 7 x weekly - 3 reps - 1 sets - 2-3 hold  Seated Elbow Flexion AAROM - 2 x daily - 7 x weekly - 10 sets - 10 hold  Seated Scapular Retraction - 2 x daily - 7 x weekly - 10 reps - 10 hold  Seated Shoulder Shrugs - 2 x daily - 7 x weekly - 10 reps - 3 sets  Seated Shoulder Flexion Towel Slide at Table Top - 2 x daily - 7 x weekly - 10 reps - 10 hold  Seated Shoulder Scaption Slide at Table Top with Forearm in Neutral - 2 x daily - 7 x weekly - 10 reps - 10 hold  Seated Shoulder External Rotation AAROM with Cane and Hand in Neutral - 2 x daily - 7 x weekly - 10 reps - 10 hold    2/16/22: Robyn Morel, PTA   Supine Shoulder External Rotation in 45 Degrees Abduction AAROM with Dowel - 2 x daily - 7 x weekly - 10 reps - 10 hold  Supine Shoulder Flexion Extension AAROM with Dowel - 2 x daily - 7 x weekly - 10 reps - 10 hold  Standing Shoulder Internal Rotation Stretch with Towel - 2 x daily - 7 x weekly - 10 reps - 10 hold  Supine Scapular Protraction in Flexion with Dumbbells - 1 x daily - 7 x weekly - 3 sets - 10 reps    Access Code: Caterina Collinsmahad  URL: [email or web address]  Date: 03/02/2022  Prepared by: Nathan Rodriguez  Exercises  Shoulder Flexion Wall Slide with Towel - 1 x daily - 7 x weekly - 3 sets - 10 reps  Standing Shoulder Abduction Slides at Wall - 1 x daily - 7 x weekly - 3 sets - 10 reps  Seated Scapular Retraction - 2 x daily - 7 x weekly - 10 reps - 10 hold  Seated Shoulder Shrugs - 2 x daily - 7 x weekly - 10 reps - 3 sets  Supine Shoulder External Rotation in 45 Degrees Abduction AAROM with Dowel - 2 x daily - 7 x weekly - 10 reps - 10 hold  Standing Shoulder Internal Rotation Stretch with Towel - 2 x daily - 7 x weekly - 10 reps - 10 hold  Supine Scapular Protraction in Flexion with Dumbbells - 1 x daily - 7 x weekly - 3 sets - 10 reps  Sidelying Shoulder External Rotation with Dumbbell - 1 x daily - 7 x weekly - 3 sets - 10 reps  Prone Shoulder Extension - Single Arm with Dumbbell - 1 x daily - 7 x weekly - 3 sets - 10 reps  Prone Single Arm Shoulder Horizontal Abduction with Dumbbell - Palm Down - 1 x daily - 7 x weekly - 3 sets - 10 reps      Therapeutic Exercise and NMR EXR  [x] (19579) Provided verbal/tactile cueing for activities related to strengthening, flexibility, endurance, ROM  for improvements in scapular, scapulothoracic and UE control with self care, reaching, carrying, lifting, house/yardwork, driving/computer work. [x] (03285) Provided verbal/tactile cueing for activities related to improving balance, coordination, kinesthetic sense, posture, motor skill, proprioception  to assist with  scapular, scapulothoracic and UE control with self care, reaching, carrying, lifting, house/yardwork, driving/computer work. Therapeutic Activities:    [x] (37138 or 38510) Provided verbal/tactile cueing for activities related to improving balance, coordination, kinesthetic sense, posture, motor skill, proprioception and motor activation to allow for proper function of scapular, scapulothoracic and UE control with self care, carrying, lifting, driving/computer work.      Home Exercise Program:    [x] (20140) Reviewed/Progressed HEP activities related to strengthening, flexibility, endurance, ROM of scapular, scapulothoracic and UE control with self care, reaching, carrying, lifting, house/yardwork, driving/computer work  [] (83534) Reviewed/Progressed HEP activities related to improving balance, coordination, kinesthetic sense, posture, motor skill, proprioception of scapular, scapulothoracic and UE control with self care, reaching, carrying, lifting, house/yardwork, driving/computer work      Manual Treatments:  PROM / STM / Oscillations-Mobs:  G-I, II, III, IV (PA's, Inf., Post.)  [x] (87061) Provided manual therapy to mobilize soft tissue/joints of cervical/CT, scapular GHJ and UE for the purpose of modulating pain, promoting relaxation,  increasing ROM, reducing/eliminating soft tissue swelling/inflammation/restriction, improving soft tissue extensibility and allowing for proper ROM for normal function with self care, reaching, carrying, lifting, house/yardwork, driving/computer work    Pt Education: Patient education on PT and plan of care including diagnosis, prognosis, treatment goals and options. Patient agrees with discussed POC and treatment and is aware of rehab process. Pt was also educated on clinic layout and use of modalities. PT gave pt business card to call if any questions. Pt was ed on sling use, wound care, self care, dressing, showering, donning/doffing sling, and protocol. Pt was ed on S&S of infection and what to do if these are experienced. Modalities: declined    Charges:   Timed Code Treatment Minutes: 54'   Total Treatment Minutes: 54'     [] EVAL (LOW) 455 1011   [] EVAL (MOD) 96322   [] EVAL (HIGH) 38947   [] RE-EVAL   [x] ZF(48273) x  1   [] IONTO  [x] NMR (40063) x  1   [] VASO  [x] Manual (72621) x 1     [] Other:  [x] TA x  1    [] Mech Traction (60537)  [] ES(attended) (57419)      [] ES (un) (49075):     GOALS:   Patient stated goal: return to normal activity    [x] Progressing: [] Met: [] Not Met: [] Adjusted     Therapist goals for Patient:   Short Term Goals: To be achieved in: 2 weeks  1. Independent in HEP and progression per patient tolerance, in order to prevent re-injury.      [] Progressing: [x] Met: [] Not Met: [] Adjusted   2. Patient will report pain at worst less than or equal to 4/10. [] Progressing: [x] Met: [] Not Met: [] Adjusted     Long Term Goals: To be achieved in: 16 weeks  1. Pt will demo UEFI of 90% or better to assist with reaching prior level of function. [x] Progressing: [] Met: [] Not Met: [] Adjusted  2. Patient will demonstrate increased AROM to shoulder flex greater than or equal to 170, ABD greater than or equal to 170, ER greater than or equal to 80, IR greater than or equal to 50 to allow for proper joint functioning as indicated by patients Functional Deficits. [x] Progressing: [] Met: [] Not Met: [] Adjusted  3. Patient will demonstrate an increase in strength to shoulder flex greater than or equal to 4+, ABD greater than or equal to 4+, ER greater than or equal to 4, IR greater than or equal to 4+ to allow for proper functional mobility as indicated by patients functional deficits. [x] Progressing: [] Met: [] Not Met: [] Adjusted  4. Patient will return to performing all self care without increased symptoms or restriction. [x] Progressing: [] Met: [] Not Met: [] Adjusted  5. Pt will report pain at worst less than or equal to 2/10. [x] Progressing: [] Met: [] Not Met: [] Adjusted  6. Pt will return to his normal workout routine. [x] Progressing: [] Met: [] Not Met: [] Adjusted           Overall Progression Towards Functional goals/ Treatment Progress Update:  [x] Patient is progressing as expected towards functional goals listed. [] Progression is slowed due to complexities/Impairments listed. [] Progression has been slowed due to co-morbidities.   [] Plan just implemented, too soon to assess goals progression <30days   [] Goals require adjustment due to lack of progress  [] Patient is not progressing as expected and requires additional follow up with physician  [] Other    Prognosis for POC: [x] Good [] Fair  [] Poor      Patient requires continued skilled

## 2022-03-24 NOTE — FLOWSHEET NOTE
100 [facility] Performance and Rehabilitation a Department of [address], [address]  Office: [phone]  Fax:  [phone]           Date:  2021    Patient Name:  Villa Barr    :    MRN: [id number]  Restrictions/Precautions:    Medical/Treatment Diagnosis Information:  · Diagnosis: s/p left knee medial meniscectomy; left knee pain M25.562. Surgery on   · Treatment Diagnosis: left knee pain- L59.821  Insurance/Certification information:  PT Insurance Information: Jo-Ann  Physician Information:  Referring Practitioner: Hoa Laguna  Has the plan of care been signed (Y/N):        [x]  Yes  []  No     Date of Patient follow up with Physician: prn      Is this a Progress Report:     []  Yes  [x]  No      Progress report/ Recertification will be due (10 Rx or 30 days whichever is less): visit 18 or         Visit # Insurance Allowable Auth Required   10 60 []  Yes []  No        Functional Scale: LEFS-80%   Date assessed: 2021    Latex Allergy:  [x]NO      []YES  Preferred Language for Healthcare:   [x]English       []other:      Pain level: 0/10    SUBJECTIVE:  He played 3 days of 18 holes. This felt fine. He did wear his neoprene sleeve. He is doing well overall. He is amb up/down the stairs at home (4 FOS).       OBJECTIVE:     Observation:    Test measurements:                ROM PROM AROM Overpressure Comment     L R L R L R     Flexion      141          Extension                                                          Strength L R Comment   Quad       Hamstring       Gastroc       Hip flexor       Hip ABD                                     RESTRICTIONS/PRECAUTIONS: WBAT    Exercises/Interventions:     Exercise/Equipment Repetitions/Resistance Other comments   Stretching     Hamstring 5x:30    Hip Flexion     ITB- Rope     Grion     Quad 5x:30    Inclined Calf 5x:30    Towel Pull Spoke to pt per pt needed a letter stating admitted date, discharge date and delivery type.  Letter generated and send via portal.   Piriformis                    SLR     Supine 3x10 6#    Prone 3x10 6#    Abduction 3x10 6#    Adducton 3x10 6#    SLR+ 5x:30 +30 pumps    clams 3x10 6# reverse     SLR + ABD 3x:30 +30 pumps         Isometrics     Quad sets     Ball Squeezes          Patellar Glides     Medial     Superior     Inferior          ROM     Weight Shift     Hang Weights     Sheet Pulls     Ankle Pumps          CKC     Calf raises     Wall sits 5x with red band  :30 wall sit with red band  1 square band walk       LSD 3x10 L1    Step ups     1 leg stand 30 sec x 5 Airex EC   Squatting     CC TKE     Balance      Monster Walks     Bridging     Triple threats     Stool Scoots     Biodex - weight shift SB knee tuck to side 3x10 ea         SB bridge with HS curl     SB roll out with hip ext into ABD 3x5 bilaterally            Walking dead lunges 2 laps 10# ea hand    Bear crawl 1 lap Fwd/bwd   Lateral bear crawl 1 lap left right    Inch worm 1 lap          Lateral lunge onto BOSU 3x10 bilaterally                   PRE     Extension SLE 10x 40#   8x 55#  6x 55# RANGE: 90/40   Flexion SLE 10x 40#   8x 55#  6x 55# RANGE: 0/90        Cable Column          Leg Press 10x 100# SL   8x 140#  6x 160# RANGE: 70 deg-10 deg. Bike     Treadmill 3' walk  :30 run;1:30 walk x3  :30  Run/:30 walk x 2  2' walk    AlterG               Gait            Access Code: [email or web address]  URL: [email or web address]  Date: 04/29/2021  Prepared by: [place] Gell Cessna    Exercises  Seated Table Hamstring Stretch - 2 x daily - 7 x weekly - 5 sets - 30 hold  Seated Gastroc Stretch with Strap - 2 x daily - 7 x weekly - 5 sets - 30 hold  Supine Quadricep Sets - 10 x daily - 7 x weekly - 10 sets - 1 reps - 10 hold  Supine Ankle Pumps - 10 x daily - 7 x weekly - 3 sets - 10 reps  Supine Straight Leg Raises - 2 x daily - 7 x weekly - 3 sets - 10 reps  Sidelying Hip Abduction - 2 x daily - 7 x weekly - 3 sets - 10 reps  Supine Hip Adduction Isometric with Ball - 1 x daily - 7 x weekly - 10 sets - 10 hold  Supine Heel Slide with Strap - 2 x daily - 7 x weekly - 10 sets - 10 hold    Therapeutic Exercise and NMR EXR  [x](88791) Provided verbal/tactile cueing for activities related to strengthening, flexibility, endurance, ROM for improvements in LE, proximal hip, and core control with self care, mobility, lifting, ambulation. [x] (49781) Provided verbal/tactile cueing for activities related to improving balance, coordination, kinesthetic sense, posture, motor skill, proprioception  to assist with LE, proximal hip, and core control in self care, mobility, lifting, ambulation and eccentric single leg control.      NMR and Therapeutic Activities:    [x] (84846 or 22539) Provided verbal/tactile cueing for activities related to improving balance, coordination, kinesthetic sense, posture, motor skill, proprioception and motor activation to allow for proper function of core, proximal hip and LE with self care and ADLs  [x] (97477) Gait Re-education- Provided training and instruction to the patient for proper LE, core and proximal hip recruitment and positioning and eccentric body weight control with ambulation re-education including up and down stairs     Home Exercise Program:    [x] (77997) Reviewed/Progressed HEP activities related to strengthening, flexibility, endurance, ROM of core, proximal hip and LE for functional self-care, mobility, lifting and ambulation/stair navigation   [x] (26160)Reviewed/Progressed HEP activities related to improving balance, coordination, kinesthetic sense, posture, motor skill, proprioception of core, proximal hip and LE for self care, mobility, lifting, and ambulation/stair navigation      Manual Treatments:  PROM / STM / Oscillations-Mobs:  G-I, II, III, IV (PA's, Inf., Post.)  [x](46527) Provided manual therapy to mobilize LE, proximal hip and/or LS spine soft tissue/joints for the purpose of modulating pain, promoting relaxation,  increasing ROM, reducing/eliminating soft tissue swelling/inflammation/restriction, improving soft tissue extensibility and allowing for proper ROM for normal function with self care, mobility, lifting and ambulation. Other:        Modalities:  Declined    Charges:   Timed Code Treatment Minutes: 79'    Total Treatment Minutes: 130'        []EVAL (LOW) 80288   [] EVAL (MOD) 85975   []  EVAL (HIGH) 54376   []  RE-EVAL   [x]  YK(80132) x2     []IONTO  [x]  NMR (46012) x1     []  VASO  []  Manual (21497) x      [] Other: DN not covered  [x]  TA x  2    [] Mech Traction (29247)  []  ES(attended) (70900)      []ES (un) (75888):     GOALS:   Patient stated goal: be back to 100% and back to training    [x]? Progressing: []? Met: []? Not Met: []? Adjusted     Therapist goals for Patient:   Short Term Goals: To be achieved in: 2 weeks  1. Independent in HEP and progression per patient tolerance, in order to prevent re-injury. []? Progressing: [x]? Met: []? Not Met: []? Adjusted   2. Patient will have a decrease in pain of 3/10 at worst to facilitate improvement in movement, function, and ADLs as indicated by functional deficits. []? Progressing: [x]? Met: []? Not Met: []? Adjusted     Long Term Goals: To be achieved in: 6 weeks  1. Pt will demo a LEFS score of 90% or better to assist with reaching prior level of function. [x]? Progressing: []? Met: []? Not Met: []? Adjusted   2. Patient will demonstrate increased AROM to knee flexion to greater than or equal to 140 and knee ext to 0 to allow for proper joint functioning as indicated by patients functional deficits. []? Progressing: [x]? Met: []? Not Met: []? Adjusted  3. Patient will demonstrate an increase in strength of hip flex, ABD, and knee flex/ext to 4+/5 to allow for proper functional mobility as indicated by patients functional deficits. [x]? Progressing: []? Met: []? Not Met: []? Adjusted  4.  Patient will return to amb in the community including up/down stairs with reciprocal gt without increased symptoms or restriction. []? Progressing: [x]? Met: []? Not Met: []? Adjusted  5. Pt will return to working out without c/o pain/limitiations. [x]? Progressing: []? Met: []? Not Met: []? Adjusted          Overall Progression Towards Functional goals/ Treatment Progress Update:  [x] Patient is progressing as expected towards functional goals listed. [] Progression is slowed due to complexities/Impairments listed. [] Progression has been slowed due to co-morbidities. [] Plan just implemented, too soon to assess goals progression <30days   [] Goals require adjustment due to lack of progress  [] Patient is not progressing as expected and requires additional follow up with physician  [] Other    Prognosis for POC: [x] Good [] Fair  [] Poor      Patient requires continued skilled intervention: [x] Yes  [] No    Treatment/Activity Tolerance:  [x] Patient able to complete treatment  [] Patient limited by fatigue  [] Patient limited by pain     [] Patient limited by other medical complications  [] Other: Pt grace session well. He demo improving ROM. He grace progressions well and without c/o. Hamstring curls were challenging for him. He did try heavier weight with them, but he was unable to do so. He requires cuing for reverse clams. These continue to be challenging. Pt would continue to benefit from skilled PT to improve strength and return to PLOF. PLAN: If pt doesn't return, this note can be considered a D/C note. Consider more Jujutsu drill type of exercises.     [x] Continue per plan of care [] Alter current plan (see comments above)  [] Plan of care initiated [] Hold pending MD visit [] Discharge    Electronically signed by: Vicky Torre, PT PT, DPT [id number]

## 2022-03-28 ENCOUNTER — HOSPITAL ENCOUNTER (OUTPATIENT)
Dept: PHYSICAL THERAPY | Age: 47
Setting detail: THERAPIES SERIES
Discharge: HOME | End: 2022-03-28
Payer: OTHER MISCELLANEOUS

## 2022-03-28 PROCEDURE — 97530 THERAPEUTIC ACTIVITIES: CPT | Performed by: PHYSICAL THERAPIST

## 2022-03-28 PROCEDURE — 97140 MANUAL THERAPY 1/> REGIONS: CPT | Performed by: PHYSICAL THERAPIST

## 2022-03-28 PROCEDURE — 97110 THERAPEUTIC EXERCISES: CPT | Performed by: PHYSICAL THERAPIST

## 2022-03-28 PROCEDURE — 97112 NEUROMUSCULAR REEDUCATION: CPT | Performed by: PHYSICAL THERAPIST

## 2022-03-28 NOTE — FLOWSHEET NOTE
100 [facility] a Department of [address], [address] [place]  Office: [phone]  Fax:  [phone]        Physical Therapy Treatment Note/ Progress Report:           Date:  3/28/2022    Patient Name:  Franny Hernandez    :  [age]  MRN: [id number]  Restrictions/Precautions:    Medical/Treatment Diagnosis Information:  · Diagnosis: s/p left shoulder biceps tenodesis. Surgery on 22;  M25.512- left shoulder pain  · Treatment Diagnosis: M25.512- left shoulder pain  Insurance/Certification information:  PT Insurance Information: Nitish Mcintyre 150  Physician Information:  Referring Practitioner: Marion Hinson  Has the plan of care been signed (Y/N):        []  Yes  [x]  No     Date of Patient follow up with Physician:       Is this a Progress Report:     [x]  Yes  []  No        If Yes:  Date Range for reporting period:  Beginning 2022  Ending 9 Mar 22    Progress report will be due (19 Rx or 30 days whichever is less): 2022      Visit # Insurance Allowable Auth Required   14 auto []  Yes [x]  No        Functional Scale: UEFI-8.75%   Date assessed: 2022  UEFI: raw score: 72; function: 90%   3/9/2022    Pain level: 0/10    SUBJECTIVE: He is feeling well. He has been able to carry a large bag of groceries. He started returning to the gym last week. He started with light weights and on the machines. He felt fine with this.          OBJECTIVE: 3/28/22   Observation:     Test measurements:          ROM PROM AROM  Comment    L R L R    Flexion   170    Abduction   172    ER   94    IR   26    Other: Scaption        Other             Strength L R Comment   Flexion      Abduction      ER      IR      Supraspinatus      Upper Trap      Lower Trap      Mid Trap      Rhomboids      Biceps      Triceps      Horizontal Abduction      Horizontal Adduction      Lats          RESTRICTIONS/PRECAUTIONS: full motion of shoulder. NWB. 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 Exercises/Interventions:   Exercise/Equipment Resistance/Repetitions Other comments   Aerobic Conditioning     Aerodyne          Stretching/PROM     Supine Wand      Table Slides Wall slides  Walks 10x:10 each  /ABD  Scaption with lift off   2x15  2/28-Initiated   3/14-Lift off added in plane of scaption    Wall Slide to ECC ABD  UE [place] Ext with ant shoulder blocking  1/2 Kneeling Latissimus stretch with dynaband  CC \"Doorway Hang\" Lat Stretch  5x:15  R hand blocking L scap 3/14: Added    UE [place]    AAROM ABD      Pulleys    Pendulum     BB IR     SL IR 10x:10 On wall        Pec doorway stretch     CBA stretch     UT stretch     LS stretch          Isometrics     Retraction     Scap Stabs on wall Up/Down, Side/side, CW/CCW 2x:20 each 1 kg Ball      Weight shift     SA Roller Wall Slide      Flexion     Abduction     External Rotation     Internal Rotation     Biceps     Triceps          PRE's     Flexion     Abduction 3x10 3# Standing scaption   External Rotation S/L 3x10 3#     Internal Rotation     Shrugs 3x10 25#   EXT     Reverse Flys     Serratus     Serratus  Lift  Horiz ABD   Horizontal Abd with ER Prone 2x10 3# DB Prone 2x10 3# DB    Lower Trap  Prone 3x10 1#   Biceps 15# 3x15    Triceps     Hi/Lo Push ups to +  2x10 3/28- 30\" table height    Retraction     Bench press NV?               Cable Column/Theraband     External Rotation    Internal Rotation 3x10 30#   Shrugs     Lats     Ext  3/9-Added; back of wrist on handle    Flex     High [place] Row  Unilateral 70# 2x10 R/L 3/23    Scapular Retraction    BIC     TRIC NV?    PNF     Bird dog rows 3x10 30#                   Dynamic Stability     Rhythmic Stabs  Ball on wall 4 way 3x20 4#         Plyoback          Manual Flex, ABD, IR, ER 8' with oscillations (grade I) and stabilization of scapula   Prone Latissimus STM      IASTM anterolateral shoulder             Access Code: Mackenzie Triana  URL: [email or web address]  Date: 02/08/2022  Prepared by: Tod Stuart    Exercises  Seated Gripping Towel - 2 x daily - 7 x weekly - 3 reps - 1 sets - 2-3 hold  Seated Elbow Flexion AAROM - 2 x daily - 7 x weekly - 10 sets - 10 hold  Seated Scapular Retraction - 2 x daily - 7 x weekly - 10 reps - 10 hold  Seated Shoulder Shrugs - 2 x daily - 7 x weekly - 10 reps - 3 sets  Seated Shoulder Flexion Towel Slide at Table Top - 2 x daily - 7 x weekly - 10 reps - 10 hold  Seated Shoulder Scaption Slide at Table Top with Forearm in Neutral - 2 x daily - 7 x weekly - 10 reps - 10 hold  Seated Shoulder External Rotation AAROM with Cane and Hand in Neutral - 2 x daily - 7 x weekly - 10 reps - 10 hold    2/16/22: Delfin Andrade, PTA   Supine Shoulder External Rotation in 45 Degrees Abduction AAROM with Dowel - 2 x daily - 7 x weekly - 10 reps - 10 hold  Supine Shoulder Flexion Extension AAROM with Dowel - 2 x daily - 7 x weekly - 10 reps - 10 hold  Standing Shoulder Internal Rotation Stretch with Towel - 2 x daily - 7 x weekly - 10 reps - 10 hold  Supine Scapular Protraction in Flexion with Dumbbells - 1 x daily - 7 x weekly - 3 sets - 10 reps    Access Code: Erika Richardson  URL: [email or web address]  Date: 03/02/2022  Prepared by: Bradly Thomas  Exercises  Shoulder Flexion Wall Slide with Towel - 1 x daily - 7 x weekly - 3 sets - 10 reps  Standing Shoulder Abduction Slides at Wall - 1 x daily - 7 x weekly - 3 sets - 10 reps  Seated Scapular Retraction - 2 x daily - 7 x weekly - 10 reps - 10 hold  Seated Shoulder Shrugs - 2 x daily - 7 x weekly - 10 reps - 3 sets  Supine Shoulder External Rotation in 45 Degrees Abduction AAROM with Dowel - 2 x daily - 7 x weekly - 10 reps - 10 hold  Standing Shoulder Internal Rotation Stretch with Towel - 2 x daily - 7 x weekly - 10 reps - 10 hold  Supine Scapular Protraction in Flexion with Dumbbells - 1 x daily - 7 x weekly - 3 sets - 10 reps  Sidelying Shoulder External Rotation with Dumbbell - 1 x daily - 7 x weekly - 3 sets - 10 reps  Prone Shoulder Extension - Single Arm with Dumbbell - 1 x daily - 7 x weekly - 3 sets - 10 reps  Prone Single Arm Shoulder Horizontal Abduction with Dumbbell - Palm Down - 1 x daily - 7 x weekly - 3 sets - 10 reps      Therapeutic Exercise and NMR EXR  [x] (61860) Provided verbal/tactile cueing for activities related to strengthening, flexibility, endurance, ROM  for improvements in scapular, scapulothoracic and UE control with self care, reaching, carrying, lifting, house/yardwork, driving/computer work. [x] (85617) Provided verbal/tactile cueing for activities related to improving balance, coordination, kinesthetic sense, posture, motor skill, proprioception  to assist with  scapular, scapulothoracic and UE control with self care, reaching, carrying, lifting, house/yardwork, driving/computer work. Therapeutic Activities:    [x] (03782 or 91427) Provided verbal/tactile cueing for activities related to improving balance, coordination, kinesthetic sense, posture, motor skill, proprioception and motor activation to allow for proper function of scapular, scapulothoracic and UE control with self care, carrying, lifting, driving/computer work.      Home Exercise Program:    [x] (97097) Reviewed/Progressed HEP activities related to strengthening, flexibility, endurance, ROM of scapular, scapulothoracic and UE control with self care, reaching, carrying, lifting, house/yardwork, driving/computer work  [] (53150) Reviewed/Progressed HEP activities related to improving balance, coordination, kinesthetic sense, posture, motor skill, proprioception of scapular, scapulothoracic and UE control with self care, reaching, carrying, lifting, house/yardwork, driving/computer work      Manual Treatments:  PROM / STM / Oscillations-Mobs:  G-I, II, III, IV (PA's, Inf., Post.)  [x] (42987) Provided manual therapy to mobilize soft tissue/joints of cervical/CT, scapular GHJ and UE for the purpose of modulating pain, promoting relaxation,  increasing ROM, reducing/eliminating soft tissue swelling/inflammation/restriction, improving soft tissue extensibility and allowing for proper ROM for normal function with self care, reaching, carrying, lifting, house/yardwork, driving/computer work    Pt Education:     Modalities: declined    Charges:   Timed Code Treatment Minutes: 48'   Total Treatment Minutes: 79'     [] EVAL (LOW) 455 1011   [] EVAL (MOD) 12567   [] EVAL (HIGH) 53038   [] RE-EVAL   [x] UT(72071) x  1   [] IONTO  [x] NMR (53693) x  1   [] VASO  [x] Manual (81733) x 1     [] Other:  [x] TA x  1    [] Mech Traction (84236)  [] ES(attended) (36616)      [] ES (un) (72964):     GOALS:   Patient stated goal: return to normal activity    [x] Progressing: [] Met: [] Not Met: [] Adjusted     Therapist goals for Patient:   Short Term Goals: To be achieved in: 2 weeks  1. Independent in HEP and progression per patient tolerance, in order to prevent re-injury. [] Progressing: [x] Met: [] Not Met: [] Adjusted   2. Patient will report pain at worst less than or equal to 4/10. [] Progressing: [x] Met: [] Not Met: [] Adjusted     Long Term Goals: To be achieved in: 16 weeks  1. Pt will demo UEFI of 90% or better to assist with reaching prior level of function. [x] Progressing: [] Met: [] Not Met: [] Adjusted  2. Patient will demonstrate increased AROM to shoulder flex greater than or equal to 170, ABD greater than or equal to 170, ER greater than or equal to 80, IR greater than or equal to 50 to allow for proper joint functioning as indicated by patients Functional Deficits. [x] Progressing: [] Met: [] Not Met: [] Adjusted  3.  Patient will demonstrate an increase in strength to shoulder flex greater than or equal to 4+, ABD greater than or equal to 4+, ER greater than or equal to 4, IR greater than or equal to 4+ to allow for proper functional mobility as indicated by patients functional deficits. [x] Progressing: [] Met: [] Not Met: [] Adjusted  4. Patient will return to performing all self care without increased symptoms or restriction. [x] Progressing: [] Met: [] Not Met: [] Adjusted  5. Pt will report pain at worst less than or equal to 2/10. [x] Progressing: [] Met: [] Not Met: [] Adjusted  6. Pt will return to his normal workout routine. [x] Progressing: [] Met: [] Not Met: [] Adjusted           Overall Progression Towards Functional goals/ Treatment Progress Update:  [x] Patient is progressing as expected towards functional goals listed. [] Progression is slowed due to complexities/Impairments listed. [] Progression has been slowed due to co-morbidities. [] Plan just implemented, too soon to assess goals progression <30days   [] Goals require adjustment due to lack of progress  [] Patient is not progressing as expected and requires additional follow up with physician  [] Other    Prognosis for POC: [x] Good [] Fair  [] Poor      Patient requires continued skilled intervention: [x] Yes  [] No    Treatment/Activity Tolerance:  [x] Patient able to complete treatment  [] Patient limited by fatigue  [] Patient limited by pain     [] Patient limited by other medical complications  [] Other: Pt grace tx well. He does have some slight pain in ABD end range. We did introduce sleeper on the wall, and the pt will continue to do this at home. His strength is progressing. We did discuss his return to the gym. I've encouraged him to work on back strengthening when he goes to the gym. After this week, pt will continue 2x/wk at the gym and 1x/wk here. Pt would continue to benefit from skilled PT to improve strength (end range in particular) and function. PLAN: If pt doesn't return, this note can be considered a D/C note. Introduce light bench.    [x] Continue per plan of care [] Alter current plan (see comments above)  [] Plan of care initiated [] Hold pending MD visit [] Discharge    Electronically signed by: RUKHSANA [id number]

## 2022-03-30 ENCOUNTER — HOSPITAL ENCOUNTER (OUTPATIENT)
Dept: PHYSICAL THERAPY | Age: 47
Setting detail: THERAPIES SERIES
Discharge: HOME | End: 2022-03-30
Payer: OTHER MISCELLANEOUS

## 2022-03-30 PROCEDURE — 97530 THERAPEUTIC ACTIVITIES: CPT | Performed by: PHYSICAL THERAPIST

## 2022-03-30 PROCEDURE — 97112 NEUROMUSCULAR REEDUCATION: CPT | Performed by: PHYSICAL THERAPIST

## 2022-03-30 PROCEDURE — 97140 MANUAL THERAPY 1/> REGIONS: CPT | Performed by: PHYSICAL THERAPIST

## 2022-03-30 PROCEDURE — 97110 THERAPEUTIC EXERCISES: CPT | Performed by: PHYSICAL THERAPIST

## 2022-03-30 NOTE — FLOWSHEET NOTE
100 [facility] a Department of [address]  [address], [address] [place]  Office: [phone]  Fax:  [phone]        Physical Therapy Treatment Note/ Progress Report:           Date:  3/30/2022    Patient Name:  Cha Serrano    :    MRN: [id number]  Restrictions/Precautions:    Medical/Treatment Diagnosis Information:  · Diagnosis: s/p left shoulder biceps tenodesis. Surgery on 22;  M25.512- left shoulder pain  · Treatment Diagnosis: M25.512- left shoulder pain  Insurance/Certification information:  PT Insurance Information: Kayla Morrison  Physician Information:  Referring Practitioner: Zee Hoff  Has the plan of care been signed (Y/N):        []  Yes  [x]  No     Date of Patient follow up with Physician:       Is this a Progress Report:     [x]  Yes  []  No        If Yes:  Date Range for reporting period:  Beginning 2022  Ending 9 Mar 22    Progress report will be due (19 Rx or 30 days whichever is less): 2022      Visit # Insurance Allowable Auth Required   15 auto []  Yes [x]  No        Functional Scale: UEFI-8.75%   Date assessed: 2022  UEFI: raw score: 72; function: 90%   3/9/2022    Pain level: 0/10    SUBJECTIVE:  He went to the gym yesterday and did biceps, triceps. He did light weight high reps. He felt some slight discomfort on the first few reps of triceps, but it improved quickly as he warmed up.           OBJECTIVE: 3/28/22   Observation:     Test measurements:          ROM PROM AROM  Comment    L R L R    Flexion   170    Abduction   172    ER   94    IR   26    Other: Scaption        Other             Strength L R Comment   Flexion      Abduction      ER      IR      Supraspinatus      Upper Trap      Lower Trap      Mid Trap      Rhomboids      Biceps      Triceps      Horizontal Abduction      Horizontal Adduction      Lats          RESTRICTIONS/PRECAUTIONS: full motion of shoulder. NWB. 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 Exercises/Interventions:   Exercise/Equipment Resistance/Repetitions Other comments   Aerobic Conditioning     Aerodyne          Stretching/PROM     Supine Wand      Table Slides Wall slides  Walks 10x:10 each  /ABD  Scaption with lift off   2x15  2/28-Initiated   3/14-Lift off added in plane of scaption    Wall Slide to ECC ABD  UE [place] Ext with ant shoulder blocking  1/2 Kneeling Latissimus stretch with dynaband  CC \"Doorway Hang\" Lat Stretch  5x:15  R hand blocking L scap 3/14: Added    UE [place]    AAROM ABD      Pulleys    Pendulum     BB IR     SL IR 10x:10 On wall        Pec doorway stretch 3x:30    CBA stretch     UT stretch     LS stretch          Isometrics     Retraction     Scap Stabs on wall      Weight shift     SA Roller Wall Slide      Flexion     Abduction     External Rotation     Internal Rotation     Biceps     Triceps          PRE's     Flexion     Abduction 3x10 3# Standing scaption   External Rotation S/L 3x10 3#     Internal Rotation     Shrugs 3x10 25#   EXT     Reverse Flys     Serratus     Serratus  Lift  Horiz ABD   Horizontal Abd with ER Prone 3x10 3# DB Prone 3x10 3# DB    Lower Trap  Prone 3x10 2#   Biceps    Triceps     Hi/Lo Push ups to +  Retraction     Bench press 3x10 15# ea With plus   Shoulder ext 3x10 6# Progress NV   UK deltoid 5' 2#              Cable Column/Theraband     External Rotation    Internal Rotation 3x10 30#   Shrugs     Lats     Ext  3/9-Added; back of wrist on handle    Flex     High [place] Row  Unilateral 70# 3x10 R/L 3/23    Scapular Retraction    BIC     TRIC     PNF     Bird dog rows 3x10 40# Hand on round rockerboard                  Dynamic Stability     Rhythmic Stabs  Ball on wall 4 way 3x20 4#    Wall michelle NV? Had some discomfort with today   Body blade NV?                    Plyoback          Manual Flex, ABD, IR, ER 8' with oscillations (grade I) and stabilization of scapula   Prone Latissimus [facility] anterolateral shoulder             Access Code: Shannon Montana  URL: [email or web address]  Date: 02/08/2022  Prepared by: Willian Stuart    Exercises  Seated Gripping Towel - 2 x daily - 7 x weekly - 3 reps - 1 sets - 2-3 hold  Seated Elbow Flexion AAROM - 2 x daily - 7 x weekly - 10 sets - 10 hold  Seated Scapular Retraction - 2 x daily - 7 x weekly - 10 reps - 10 hold  Seated Shoulder Shrugs - 2 x daily - 7 x weekly - 10 reps - 3 sets  Seated Shoulder Flexion Towel Slide at Table Top - 2 x daily - 7 x weekly - 10 reps - 10 hold  Seated Shoulder Scaption Slide at Table Top with Forearm in Neutral - 2 x daily - 7 x weekly - 10 reps - 10 hold  Seated Shoulder External Rotation AAROM with Cane and Hand in Neutral - 2 x daily - 7 x weekly - 10 reps - 10 hold    2/16/22: Claire Pena, PTA   Supine Shoulder External Rotation in 45 Degrees Abduction AAROM with Dowel - 2 x daily - 7 x weekly - 10 reps - 10 hold  Supine Shoulder Flexion Extension AAROM with Dowel - 2 x daily - 7 x weekly - 10 reps - 10 hold  Standing Shoulder Internal Rotation Stretch with Towel - 2 x daily - 7 x weekly - 10 reps - 10 hold  Supine Scapular Protraction in Flexion with Dumbbells - 1 x daily - 7 x weekly - 3 sets - 10 reps    Access Code: Shannon Montana  URL: [email or web address]  Date: 03/02/2022  Prepared by: Cory Pena  Exercises  Shoulder Flexion Wall Slide with Towel - 1 x daily - 7 x weekly - 3 sets - 10 reps  Standing Shoulder Abduction Slides at Wall - 1 x daily - 7 x weekly - 3 sets - 10 reps  Seated Scapular Retraction - 2 x daily - 7 x weekly - 10 reps - 10 hold  Seated Shoulder Shrugs - 2 x daily - 7 x weekly - 10 reps - 3 sets  Supine Shoulder External Rotation in 45 Degrees Abduction AAROM with Dowel - 2 x daily - 7 x weekly - 10 reps - 10 hold  Standing Shoulder Internal Rotation Stretch with Towel - 2 x daily - 7 x weekly - 10 reps - 10 hold  Supine Scapular Protraction in Flexion with Dumbbells - 1 x daily - 7 x weekly - 3 sets - 10 reps  Sidelying Shoulder External Rotation with Dumbbell - 1 x daily - 7 x weekly - 3 sets - 10 reps  Prone Shoulder Extension - Single Arm with Dumbbell - 1 x daily - 7 x weekly - 3 sets - 10 reps  Prone Single Arm Shoulder Horizontal Abduction with Dumbbell - Palm Down - 1 x daily - 7 x weekly - 3 sets - 10 reps      Therapeutic Exercise and NMR EXR  [x] (06595) Provided verbal/tactile cueing for activities related to strengthening, flexibility, endurance, ROM  for improvements in scapular, scapulothoracic and UE control with self care, reaching, carrying, lifting, house/yardwork, driving/computer work. [x] (91622) Provided verbal/tactile cueing for activities related to improving balance, coordination, kinesthetic sense, posture, motor skill, proprioception  to assist with  scapular, scapulothoracic and UE control with self care, reaching, carrying, lifting, house/yardwork, driving/computer work. Therapeutic Activities:    [x] (67447 or 43526) Provided verbal/tactile cueing for activities related to improving balance, coordination, kinesthetic sense, posture, motor skill, proprioception and motor activation to allow for proper function of scapular, scapulothoracic and UE control with self care, carrying, lifting, driving/computer work.      Home Exercise Program:    [x] (27954) Reviewed/Progressed HEP activities related to strengthening, flexibility, endurance, ROM of scapular, scapulothoracic and UE control with self care, reaching, carrying, lifting, house/yardwork, driving/computer work  [] (56286) Reviewed/Progressed HEP activities related to improving balance, coordination, kinesthetic sense, posture, motor skill, proprioception of scapular, scapulothoracic and UE control with self care, reaching, carrying, lifting, house/yardwork, driving/computer work      Manual Treatments:  PROM / STM / Oscillations-Mobs:  G-I, II, III, IV (PA's, Inf., Post.)  [x] (00253) Provided manual therapy to mobilize soft tissue/joints of cervical/CT, scapular GHJ and UE for the purpose of modulating pain, promoting relaxation,  increasing ROM, reducing/eliminating soft tissue swelling/inflammation/restriction, improving soft tissue extensibility and allowing for proper ROM for normal function with self care, reaching, carrying, lifting, house/yardwork, driving/computer work    Pt Education:     Modalities: declined    Charges:   Timed Code Treatment Minutes: 48'   Total Treatment Minutes: 60'     [] EVAL (LOW) 86744   [] EVAL (MOD) 11926   [] EVAL (HIGH) 32849   [] RE-EVAL   [x] QN(22073) x  1   [] IONTO  [x] NMR (59390) x  1   [] VASO  [x] Manual (18680) x 1     [] Other:  [x] TA x  1    [] Mech Traction (84518)  [] ES(attended) (06730)      [] ES (un) (55856):     GOALS:   Patient stated goal: return to normal activity    [x] Progressing: [] Met: [] Not Met: [] Adjusted     Therapist goals for Patient:   Short Term Goals: To be achieved in: 2 weeks  1. Independent in HEP and progression per patient tolerance, in order to prevent re-injury. [] Progressing: [x] Met: [] Not Met: [] Adjusted   2. Patient will report pain at worst less than or equal to 4/10. [] Progressing: [x] Met: [] Not Met: [] Adjusted     Long Term Goals: To be achieved in: 16 weeks  1. Pt will demo UEFI of 90% or better to assist with reaching prior level of function. [x] Progressing: [] Met: [] Not Met: [] Adjusted  2. Patient will demonstrate increased AROM to shoulder flex greater than or equal to 170, ABD greater than or equal to 170, ER greater than or equal to 80, IR greater than or equal to 50 to allow for proper joint functioning as indicated by patients Functional Deficits. [x] Progressing: [] Met: [] Not Met: [] Adjusted  3.  Patient will demonstrate an increase in strength to shoulder flex greater than or equal to 4+, ABD greater than or equal to 4+, ER greater than or equal to 4, IR greater than or equal to 4+ to allow for proper functional mobility as indicated by patients functional deficits. [x] Progressing: [] Met: [] Not Met: [] Adjusted  4. Patient will return to performing all self care without increased symptoms or restriction. [x] Progressing: [] Met: [] Not Met: [] Adjusted  5. Pt will report pain at worst less than or equal to 2/10. [x] Progressing: [] Met: [] Not Met: [] Adjusted  6. Pt will return to his normal workout routine. [x] Progressing: [] Met: [] Not Met: [] Adjusted           Overall Progression Towards Functional goals/ Treatment Progress Update:  [x] Patient is progressing as expected towards functional goals listed. [] Progression is slowed due to complexities/Impairments listed. [] Progression has been slowed due to co-morbidities. [] Plan just implemented, too soon to assess goals progression <30days   [] Goals require adjustment due to lack of progress  [] Patient is not progressing as expected and requires additional follow up with physician  [] Other    Prognosis for POC: [x] Good [] Fair  [] Poor      Patient requires continued skilled intervention: [x] Yes  [] No    Treatment/Activity Tolerance:  [x] Patient able to complete treatment  [] Patient limited by fatigue  [] Patient limited by pain     [] Patient limited by other medical complications  [] Other: Pt grace tx well. He did have some pinching when we tried wall angels. This may be due to some posterior tightness and cuff weakness. We discussed focusing on cuff strength (no more than 3x/wk), IR stretches, and retractions. Pt is understanding. After this week, pt will continue 2x/wk at the gym and 1x/wk here. Pt would continue to benefit from skilled PT to improve strength (end range in particular) and function. PLAN: If pt doesn't return, this note can be considered a D/C note. Progress strength per pt grace. Next week, pt to come 1x/wk and will continue his routine a the gym for his HEP.    [x] Continue per plan of care [] Alter current plan (see comments above)  [] Plan of care initiated [] Hold pending MD visit [] Discharge    Electronically signed by: Anthony Salcedo, [id number]

## 2022-04-06 ENCOUNTER — HOSPITAL ENCOUNTER (OUTPATIENT)
Dept: PHYSICAL THERAPY | Age: 47
Setting detail: THERAPIES SERIES
Discharge: HOME | End: 2022-04-06
Payer: COMMERCIAL

## 2022-04-06 PROCEDURE — 97110 THERAPEUTIC EXERCISES: CPT | Performed by: PHYSICAL THERAPIST

## 2022-04-06 PROCEDURE — 97112 NEUROMUSCULAR REEDUCATION: CPT | Performed by: PHYSICAL THERAPIST

## 2022-04-06 PROCEDURE — 97530 THERAPEUTIC ACTIVITIES: CPT | Performed by: PHYSICAL THERAPIST

## 2022-04-06 NOTE — PLAN OF CARE
[facility] a Department of [address], [address]  Office: [phone]  Fax:  [phone]        Physical Therapy Treatment Note/ Progress Report:      Physical Therapy Re-Certification Plan of Care    Dear Dr. Eva Garner,    We had the pleasure of treating the following patient for physical therapy services at [address]. A summary of our findings can be found in the updated assessment below. This includes our plan of care. If you have any questions or concerns regarding these findings, please do not hesitate to contact me at the office phone number checked above. Thank you for the referral.     Physician Signature:________________________________Date:__________________  By signing above (or electronic signature), therapists plan is approved by physician    Date Range Of Visits: 2/8/22-4/6/2022  Total Visits to Date: 12  Overall Response to Treatment:   [] Patient is responding well to treatment and improvement is noted with regards to goals   [] Patient should continue to improve in reasonable time if they continue HEP   [] Patient has plateaued and is no longer responding to skilled PT intervention    [] Patient is getting worse and would benefit from return to referring MD   [] Patient unable to adhere to initial POC   [x] Other: Pt grace tx well. He grace progressions well. Pt did do back today, and we thus modified his tx today to fit that. We talked about pacing when returning to chores and gradual return to lifting. He has been able to do this with his lifting. He demo good ROM with some IR tightness and overall good strength. His slight soreness is probably due to doing more recently. He does report pain at worst at 4/10 with morning stretch.   Pt will see MD next week and can return pending his f/u with the MD.           Date:  4/6/2022    Patient Name:  Daniela Paulina    :  75[birth year]  MRN: [id number]  Restrictions/Precautions:    Medical/Treatment Diagnosis Information:  · Diagnosis: s/p left shoulder biceps tenodesis. Surgery on 22;  M25.512- left shoulder pain  · Treatment Diagnosis: M25.512- left shoulder pain  Insurance/Certification information:  PT Insurance Information: Robinson Goldberg  Physician Information:  Referring Practitioner: Keysha Sanchez  Has the plan of care been signed (Y/N):        []  Yes  [x]  No     Date of Patient follow up with Physician:       Is this a Progress Report:     [x]  Yes  []  No        If Yes:  Date Range for reporting period:  Beginning 2022  Ending     Progress report will be due (19 Rx or 30 days whichever is less): 4 May 2022      Visit # Insurance Allowable Auth Required   16 auto []  Yes [x]  No        Functional Scale: UEFI-98.75%   Date assessed: 2022      Pain level: 2/10    SUBJECTIVE:  He did back this morning at the gym. He thinks he pushed it a little bit. He could feel it with wide  lat pull down. He also did  some bags of mulch and did some yard work. He feels it in the biceps area (pointing near the incision). He has difficulty with bench to chest, flies. He continues to modify and be mindful with his gym routine. OBJECTIVE:    Observation:     Test measurements:          ROM PROM AROM  Comment    L R L R    Flexion   171    Abduction   192    ER   95    IR   43    Other: Scaption        Other             Strength L R Comment   Flexion 4+     Abduction 4     ER 4+     IR 4+     Supraspinatus      Upper Trap      Lower Trap      Mid Trap      Rhomboids      Biceps 4+ to 5     Triceps      Horizontal Abduction      Horizontal Adduction      Lats          RESTRICTIONS/PRECAUTIONS: full motion of shoulder. NWB. 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         Exercises/Interventions:   Exercise/Equipment Resistance/Repetitions Other comments   Aerobic Conditioning     Aerodyne Stretching/PROM     Supine Wand      Table Slides Wall slides  Walks 10x:10 each  /ABD  Scaption with lift off   2x15     Wall Slide to ECC ABD  UE [place] Ext with ant shoulder blocking  1/2 Kneeling Latissimus stretch with dynaband  CC \"Doorway Hang\" Lat Stretch     UE [place]    AAROM ABD      Pulleys    Pendulum     BB IR     SL IR 10x:10 On wall        Pec doorway stretch 3x:30    CBA stretch     UT stretch     LS stretch          Isometrics     Retraction     Scap Stabs on wall     Weight shift     SA Roller Wall Slide      Flexion     Abduction     External Rotation     Internal Rotation     Biceps     Triceps          PRE's     Flexion     Abduction 3x10 4# Standing scaption   External Rotation S/L 3x10 3#     Internal Rotation     Shrugs 3x10 25#    EXT     Reverse Flys     Serratus     Serratus  Lift  Horizontal Abd with ER Prone 3x10 3-4# DB    Lower Trap  Prone 3x10 3#    Biceps    Triceps     Hi/Lo Push ups to +      Retraction     Bench press  With plus   Shoulder ext     UK deltoid     Prone Cone lift off with cones at 9/3 and 10/2 x10    Prone Row to ER to [place] x10 Off ball             Cable Column/Theraband     External Rotation    Internal Rotation 3x10 30#   Shrugs     Lats     Ext     Flex     High [place] Row      Scapular Retraction    BIC     TRIC     PNF     Bird dog rows  Hand on round rockerboard                  Dynamic Stability     Rhythmic Stabs  Ball on wall 4 way 3x20 4#    Wall michelle     Body blade 5x:20    Plank shoulder tap using 10# wt for handles 3x10               Plyoback          Manual    Prone Latissimus STM      IASTM anterolateral shoulder             Access Code: Amor Joanne  URL: [email or web address]  Date: 02/08/2022  Prepared by: Denisse Stuart    Exercises  Seated Gripping Towel - 2 x daily - 7 x weekly - 3 reps - 1 sets - 2-3 hold  Seated Elbow Flexion AAROM - 2 x daily - 7 x weekly - 10 sets - 10 hold  Seated Scapular Retraction - 2 x daily - 7 x weekly - 10 reps - 10 hold  Seated Shoulder Shrugs - 2 x daily - 7 x weekly - 10 reps - 3 sets  Seated Shoulder Flexion Towel Slide at Table Top - 2 x daily - 7 x weekly - 10 reps - 10 hold  Seated Shoulder Scaption Slide at Table Top with Forearm in Neutral - 2 x daily - 7 x weekly - 10 reps - 10 hold  Seated Shoulder External Rotation AAROM with Cane and Hand in Neutral - 2 x daily - 7 x weekly - 10 reps - 10 hold    2/16/22: Ashly Costello, PTA   Supine Shoulder External Rotation in 45 Degrees Abduction AAROM with Dowel - 2 x daily - 7 x weekly - 10 reps - 10 hold  Supine Shoulder Flexion Extension AAROM with Dowel - 2 x daily - 7 x weekly - 10 reps - 10 hold  Standing Shoulder Internal Rotation Stretch with Towel - 2 x daily - 7 x weekly - 10 reps - 10 hold  Supine Scapular Protraction in Flexion with Dumbbells - 1 x daily - 7 x weekly - 3 sets - 10 reps    Access Code: Snow Araujo  URL: [email or web address]  Date: 03/02/2022  Prepared by: Versa Moment  Exercises  Shoulder Flexion Wall Slide with Towel - 1 x daily - 7 x weekly - 3 sets - 10 reps  Standing Shoulder Abduction Slides at [address] - 1 x daily - 7 x weekly - 3 sets - 10 reps  Seated Scapular Retraction - 2 x daily - 7 x weekly - 10 reps - 10 hold  Seated Shoulder Shrugs - 2 x daily - 7 x weekly - 10 reps - 3 sets  Supine Shoulder External Rotation in 45 Degrees Abduction AAROM with Dowel - 2 x daily - 7 x weekly - 10 reps - 10 hold  Standing Shoulder Internal Rotation Stretch with Towel - 2 x daily - 7 x weekly - 10 reps - 10 hold  Supine Scapular Protraction in Flexion with Dumbbells - 1 x daily - 7 x weekly - 3 sets - 10 reps  Sidelying Shoulder External Rotation with Dumbbell - 1 x daily - 7 x weekly - 3 sets - 10 reps  Prone Shoulder Extension - Single Arm with Dumbbell - 1 x daily - 7 x weekly - 3 sets - 10 reps  Prone Single Arm Shoulder Horizontal Abduction with Dumbbell - Palm Down - 1 x daily - 7 x weekly - 3 sets - 10 reps      Therapeutic Exercise and NMR EXR  [x] (61792) Provided verbal/tactile cueing for activities related to strengthening, flexibility, endurance, ROM  for improvements in scapular, scapulothoracic and UE control with self care, reaching, carrying, lifting, house/yardwork, driving/computer work. [x] (32943) Provided verbal/tactile cueing for activities related to improving balance, coordination, kinesthetic sense, posture, motor skill, proprioception  to assist with  scapular, scapulothoracic and UE control with self care, reaching, carrying, lifting, house/yardwork, driving/computer work. Therapeutic Activities:    [x] (08445 or 44329) Provided verbal/tactile cueing for activities related to improving balance, coordination, kinesthetic sense, posture, motor skill, proprioception and motor activation to allow for proper function of scapular, scapulothoracic and UE control with self care, carrying, lifting, driving/computer work.      Home Exercise Program:    [x] (92274) Reviewed/Progressed HEP activities related to strengthening, flexibility, endurance, ROM of scapular, scapulothoracic and UE control with self care, reaching, carrying, lifting, house/yardwork, driving/computer work  [] (90123) Reviewed/Progressed HEP activities related to improving balance, coordination, kinesthetic sense, posture, motor skill, proprioception of scapular, scapulothoracic and UE control with self care, reaching, carrying, lifting, house/yardwork, driving/computer work      Manual Treatments:  PROM / STM / Oscillations-Mobs:  G-I, II, III, IV (PA's, Inf., Post.)  [x] (09933) Provided manual therapy to mobilize soft tissue/joints of cervical/CT, scapular GHJ and UE for the purpose of modulating pain, promoting relaxation,  increasing ROM, reducing/eliminating soft tissue swelling/inflammation/restriction, improving soft tissue extensibility and allowing for proper ROM for normal function with self care, reaching, carrying, lifting, house/yardwork, driving/computer work    Pt Education:     Modalities: declined    Charges:   Timed Code Treatment Minutes: 39'   Total Treatment Minutes: 72'     [] EVAL (LOW) 455 1011   [] EVAL (MOD) 59507   [] EVAL (HIGH) 31243   [] RE-EVAL   [x] YM(15340) x  1   [] IONTO  [x] NMR (08709) x  1   [] VASO  [] Manual (66718) x      [] Other:  [x] TA x  1    [] Mech Traction (54850)  [] ES(attended) (89171)      [] ES (un) (14231):     GOALS:   Patient stated goal: return to normal activity    [x] Progressing: [] Met: [] Not Met: [] Adjusted     Therapist goals for Patient:   Short Term Goals: To be achieved in: 2 weeks  1. Independent in HEP and progression per patient tolerance, in order to prevent re-injury. [] Progressing: [x] Met: [] Not Met: [] Adjusted   2. Patient will report pain at worst less than or equal to 4/10. [] Progressing: [x] Met: [] Not Met: [] Adjusted     Long Term Goals: To be achieved in: 16 weeks  1. Pt will demo UEFI of 90% or better to assist with reaching prior level of function. [] Progressing: [x] Met: [] Not Met: [] Adjusted  2. Patient will demonstrate increased AROM to shoulder flex greater than or equal to 170, ABD greater than or equal to 170, ER greater than or equal to 80, IR greater than or equal to 50 to allow for proper joint functioning as indicated by patients Functional Deficits. [x] Progressing: [] Met: [] Not Met: [] Adjusted  3. Patient will demonstrate an increase in strength to shoulder flex greater than or equal to 4+, ABD greater than or equal to 4+, ER greater than or equal to 4, IR greater than or equal to 4+ to allow for proper functional mobility as indicated by patients functional deficits. [x] Progressing: [] Met: [] Not Met: [] Adjusted  4. Patient will return to performing all self care without increased symptoms or restriction. [] Progressing: [x] Met: [] Not Met: [] Adjusted  5. Pt will report pain at worst less than or equal to 2/10.    [x] Progressing: [] Met: [] Not Met: [] Adjusted  6. Pt will return to his normal workout routine. [x] Progressing: [] Met: [] Not Met: [] Adjusted           Overall Progression Towards Functional goals/ Treatment Progress Update:  [x] Patient is progressing as expected towards functional goals listed. [] Progression is slowed due to complexities/Impairments listed. [] Progression has been slowed due to co-morbidities. [] Plan just implemented, too soon to assess goals progression <30days   [] Goals require adjustment due to lack of progress  [] Patient is not progressing as expected and requires additional follow up with physician  [] Other    Prognosis for POC: [x] Good [] Fair  [] Poor      Patient requires continued skilled intervention: [x] Yes  [] No    Treatment/Activity Tolerance:  [x] Patient able to complete treatment  [] Patient limited by fatigue  [] Patient limited by pain     [] Patient limited by other medical complications  [] Other: Pt grace tx well. He grace progressions well. Pt did do back today, and we thus modified his tx today to fit that. We talked about pacing when returning to chores and gradual return to lifting. He has been able to do this with his lifting. He demo good ROM with some IR tightness and overall good strength. His slight soreness is probably due to doing more recently. He does report pain at worst at 4/10 with morning stretch. Pt will see MD next week and can return pending his f/u with the MD.       PLAN: If pt doesn't return, this note can be considered a D/C note. Progress strength per pt grace.    [x] Continue per plan of care [] Alter current plan (see comments above)  [] Plan of care initiated [] Hold pending MD visit [] Discharge    Electronically signed by: BAUDILIO English,[id number]

## 2022-04-12 ENCOUNTER — OFFICE VISIT (OUTPATIENT)
Dept: ORTHOPEDIC SURGERY | Age: 47
End: 2022-04-12

## 2022-04-12 VITALS — HEIGHT: 74 IN | RESPIRATION RATE: 12 BRPM | WEIGHT: 189 LBS | BODY MASS INDEX: 24.26 KG/M2

## 2022-04-12 DIAGNOSIS — M75.22 BICEPS TENDINITIS ON LEFT: ICD-10-CM

## 2022-04-12 DIAGNOSIS — M25.512 ACUTE PAIN OF LEFT SHOULDER: Primary | ICD-10-CM

## 2022-04-12 DIAGNOSIS — S43.432A SUPERIOR LABRUM ANTERIOR-TO-POSTERIOR (SLAP) TEAR OF LEFT SHOULDER: ICD-10-CM

## 2022-04-12 PROCEDURE — 99024 POSTOP FOLLOW-UP VISIT: CPT | Performed by: ORTHOPAEDIC SURGERY

## 2022-04-12 RX ORDER — TESTOSTERONE CYPIONATE 200 MG/ML
INJECTION INTRAMUSCULAR
COMMUNITY
Start: 2022-03-10 | End: 2022-10-26

## 2022-04-12 NOTE — PROGRESS NOTES
Marshall Dannie is today status post left shoulder arthroscopy with bicep tenodesis performed February 2. He is doing really well and reports no pain at this time. He is back in the gym and is pleased. Today, incisions are well-healed. Left shoulder has full motion and no pain with [place]'s maneuver. Bicep contour is appropriate. He has no pain with elevation no pain with Mcfarland or impingement maneuvers. At this point am pleased with his progress.   He will follow up with me on an as-needed basis

## 2022-04-13 ENCOUNTER — HOSPITAL ENCOUNTER (OUTPATIENT)
Dept: PHYSICAL THERAPY | Age: 47
Setting detail: THERAPIES SERIES
Discharge: HOME | End: 2022-04-13
Payer: COMMERCIAL

## 2022-04-13 ENCOUNTER — OFFICE VISIT (OUTPATIENT)
Dept: ORTHOPEDIC SURGERY | Age: 47
End: 2022-04-13
Payer: COMMERCIAL

## 2022-04-13 VITALS — HEIGHT: 74 IN | BODY MASS INDEX: 24.26 KG/M2 | WEIGHT: 189 LBS

## 2022-04-13 DIAGNOSIS — S62.612A CLOSED DISPLACED FRACTURE OF PROXIMAL PHALANX OF RIGHT MIDDLE FINGER, INITIAL ENCOUNTER: Primary | ICD-10-CM

## 2022-04-13 PROCEDURE — 97112 NEUROMUSCULAR REEDUCATION: CPT | Performed by: PHYSICAL THERAPIST

## 2022-04-13 PROCEDURE — 99213 OFFICE O/P EST LOW 20 MIN: CPT | Performed by: NURSE PRACTITIONER

## 2022-04-13 PROCEDURE — 97530 THERAPEUTIC ACTIVITIES: CPT | Performed by: PHYSICAL THERAPIST

## 2022-04-13 PROCEDURE — 97110 THERAPEUTIC EXERCISES: CPT | Performed by: PHYSICAL THERAPIST

## 2022-04-13 NOTE — FLOWSHEET NOTE
[facility] a Department of [address]  Faiza Omar [place] [address], [address]  Office: [phone]  Fax:  [phone]        Physical Therapy Treatment Note/ Progress Report:            Date:  2022    Patient Name:  Bacilio Steiner    :  [age]  MRN: [id number]  Restrictions/Precautions:    Medical/Treatment Diagnosis Information:  · Diagnosis: s/p left shoulder biceps tenodesis. Surgery on 22;  M25.512- left shoulder pain  · Treatment Diagnosis: M25.512- left shoulder pain  Insurance/Certification information:  PT Insurance Information: Bruna Hernandez  Physician Information:  Referring Practitioner: Merle Tran  Has the plan of care been signed (Y/N):        []  Yes  [x]  No     Date of Patient follow up with Physician: prn      Is this a Progress Report:     [x]  Yes  []  No        If Yes:  Date Range for reporting period:  Beginning 2022  Ending     Progress report will be due (19 Rx or 30 days whichever is less): 4 May 2022      Visit # Insurance Allowable Auth Required   17 auto []  Yes [x]  No        Functional Scale: UEFI-98.75%   Date assessed: 2022      Pain level: 0/10    SUBJECTIVE:  He is doing well. He lifted on . This went well. He did not lift yesterday. He would like to continue tx to return to OF. OBJECTIVE:    Observation:     Test measurements:          ROM PROM AROM  Comment    L R L R    Flexion   171    Abduction   192    ER   95    IR   43    Other: Scaption        Other             Strength L R Comment   Flexion 4+     Abduction 4     ER 4+     IR 4+     Supraspinatus      Upper Trap      Lower Trap      Mid Trap      Rhomboids      Biceps 4+ to 5     Triceps      Horizontal Abduction      Horizontal Adduction      Lats          RESTRICTIONS/PRECAUTIONS: full motion of shoulder. NWB. 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         Exercises/Interventions:   Exercise/Equipment Resistance/Repetitions Other comments   Aerobic Conditioning     Aerodyne          Stretching/PROM     Supine Wand      Table Slides Wall slides  Walks 10x:10 each  /ABD  Scaption with lift off   2x15     Wall Slide to ECC ABD      UE [place] Ext with ant shoulder blocking      1/2 Kneeling Latissimus stretch with dynaband  CC \"Doorway Hang\" Lat Stretch     UE [place]    AAROM ABD      Pulleys    Pendulum     BB IR     SL IR 10x:10 On wall        Pec doorway stretch 3x:30    CBA stretch     UT stretch     LS stretch          Isometrics     Retraction     Scap Stabs on wall     Weight shift     SA Roller Wall Slide      Flexion     Abduction     External Rotation     Internal Rotation     Biceps     Triceps          PRE's     Flexion     Abduction 3x10 10# Standing scaption   External Rotation S/L 3x10 6#  Progress NV?    Internal Rotation     Shrugs 3x10 25#    EXT     Reverse Flys     Serratus     Serratus  Lift  Horizontal Abd with ER Prone 3x10 5# DB    Lower Trap  Prone 3x10 5#    Biceps 3x10 25#   Triceps     Hi/Lo Push ups to +      Retraction     Bench press  With plus   Shoulder ext     UK deltoid     Prone Cone lift off with cones at 9/3 and 10/2     Prone Row to ER to [place]  Off ball             Cable Column/Theraband     External Rotation    Internal Rotation 3x10 35#   Shrugs     Lats     Ext     Flex     High [place] Row  3x10 70# hold with single arm row    Scapular Retraction    BIC     TRIC     PNF     Bird dog rows  Hand on round rockerboard                  Dynamic Stability     Rhythmic Stabs  Ball on wall 4 way 3x20 7#    Wall michelle 3x5    Body blade 5x:20 IR/ER  3x10 circles in flex         SB push up hold on wall 10x:10    Pull through plank with 15# 3x10    Prone T ball drop 3x:20 Green in left, red in right   BOSU plank with rocks AP and LR 30x ea              Plyoback          Manual    Prone Latissimus STM      IASTM anterolateral shoulder             Access Code: Amadeo Mann  URL: [email or web address]  Date: 02/08/2022  Prepared by: Nelida Vaca Cessna    Exercises  Seated Gripping Towel - 2 x daily - 7 x weekly - 3 reps - 1 sets - 2-3 hold  Seated Elbow Flexion AAROM - 2 x daily - 7 x weekly - 10 sets - 10 hold  Seated Scapular Retraction - 2 x daily - 7 x weekly - 10 reps - 10 hold  Seated Shoulder Shrugs - 2 x daily - 7 x weekly - 10 reps - 3 sets  Seated Shoulder Flexion Towel Slide at Table Top - 2 x daily - 7 x weekly - 10 reps - 10 hold  Seated Shoulder Scaption Slide at Table Top with Forearm in Neutral - 2 x daily - 7 x weekly - 10 reps - 10 hold  Seated Shoulder External Rotation AAROM with Cane and Hand in Neutral - 2 x daily - 7 x weekly - 10 reps - 10 hold    2/16/22: Nathanael Bhat, PTA   Supine Shoulder External Rotation in 45 Degrees Abduction AAROM with Dowel - 2 x daily - 7 x weekly - 10 reps - 10 hold  Supine Shoulder Flexion Extension AAROM with Dowel - 2 x daily - 7 x weekly - 10 reps - 10 hold  Standing Shoulder Internal Rotation Stretch with Towel - 2 x daily - 7 x weekly - 10 reps - 10 hold  Supine Scapular Protraction in Flexion with Dumbbells - 1 x daily - 7 x weekly - 3 sets - 10 reps    Access Code: Luci Chana  URL: [email or web address]  Date: 03/02/2022  Prepared by: Tricia Moore  Exercises  Shoulder Flexion Wall Slide with Towel - 1 x daily - 7 x weekly - 3 sets - 10 reps  Standing Shoulder Abduction Slides at Wall - 1 x daily - 7 x weekly - 3 sets - 10 reps  Seated Scapular Retraction - 2 x daily - 7 x weekly - 10 reps - 10 hold  Seated Shoulder Shrugs - 2 x daily - 7 x weekly - 10 reps - 3 sets  Supine Shoulder External Rotation in 45 Degrees Abduction AAROM with Dowel - 2 x daily - 7 x weekly - 10 reps - 10 hold  Standing Shoulder Internal Rotation Stretch with Towel - 2 x daily - 7 x weekly - 10 reps - 10 hold  Supine Scapular Protraction in Flexion with Dumbbells - 1 x daily - 7 x weekly - 3 sets - 10 reps  Sidelying Shoulder External Rotation with Dumbbell - 1 x daily - 7 x weekly - 3 sets - 10 reps  Prone Shoulder Extension - Single Arm with Dumbbell - 1 x daily - 7 x weekly - 3 sets - 10 reps  Prone Single Arm Shoulder Horizontal Abduction with Dumbbell - Palm Down - 1 x daily - 7 x weekly - 3 sets - 10 reps      Therapeutic Exercise and NMR EXR  [x] (80833) Provided verbal/tactile cueing for activities related to strengthening, flexibility, endurance, ROM  for improvements in scapular, scapulothoracic and UE control with self care, reaching, carrying, lifting, house/yardwork, driving/computer work. [x] (45053) Provided verbal/tactile cueing for activities related to improving balance, coordination, kinesthetic sense, posture, motor skill, proprioception  to assist with  scapular, scapulothoracic and UE control with self care, reaching, carrying, lifting, house/yardwork, driving/computer work. Therapeutic Activities:    [x] (85357 or 79202) Provided verbal/tactile cueing for activities related to improving balance, coordination, kinesthetic sense, posture, motor skill, proprioception and motor activation to allow for proper function of scapular, scapulothoracic and UE control with self care, carrying, lifting, driving/computer work.      Home Exercise Program:    [x] (34790) Reviewed/Progressed HEP activities related to strengthening, flexibility, endurance, ROM of scapular, scapulothoracic and UE control with self care, reaching, carrying, lifting, house/yardwork, driving/computer work  [] (96886) Reviewed/Progressed HEP activities related to improving balance, coordination, kinesthetic sense, posture, motor skill, proprioception of scapular, scapulothoracic and UE control with self care, reaching, carrying, lifting, house/yardwork, driving/computer work      Manual Treatments:  PROM / STM / Oscillations-Mobs:  G-I, II, III, IV (PA's, Inf., Post.)  [x] (65606) Provided manual therapy to mobilize soft tissue/joints of cervical/CT, scapular GHJ and deficits. [x] Progressing: [] Met: [] Not Met: [] Adjusted  4. Patient will return to performing all self care without increased symptoms or restriction. [] Progressing: [x] Met: [] Not Met: [] Adjusted  5. Pt will report pain at worst less than or equal to 2/10. [x] Progressing: [] Met: [] Not Met: [] Adjusted  6. Pt will return to his normal workout routine. [x] Progressing: [] Met: [] Not Met: [] Adjusted           Overall Progression Towards Functional goals/ Treatment Progress Update:  [x] Patient is progressing as expected towards functional goals listed. [] Progression is slowed due to complexities/Impairments listed. [] Progression has been slowed due to co-morbidities. [] Plan just implemented, too soon to assess goals progression <30days   [] Goals require adjustment due to lack of progress  [] Patient is not progressing as expected and requires additional follow up with physician  [] Other    Prognosis for POC: [x] Good [] Fair  [] Poor      Patient requires continued skilled intervention: [x] Yes  [] No    Treatment/Activity Tolerance:  [x] Patient able to complete treatment  [] Patient limited by fatigue  [] Patient limited by pain     [] Patient limited by other medical complications  [] Other: Pt grace tx well. He was fatigued after tx, but he did not have c/o pain. He will continue once a week to progress his routine and learn progressions. Pt is doing well at the gym, but he is not completely back to his normal routine. Pt would continue to benefit from skilled PT to improve strength, ROM/flexibility, and function. PLAN: If pt doesn't return, this note can be considered a D/C note. Progress strength per pt grace.     [x] Continue per plan of care [] Alter current plan (see comments above)  [] Plan of care initiated [] Hold pending MD visit [] Discharge    Electronically signed by: Rodriguez Morrison [id number]
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
## 2022-04-18 ENCOUNTER — HOSPITAL ENCOUNTER (OUTPATIENT)
Dept: PHYSICAL THERAPY | Age: 47
Setting detail: THERAPIES SERIES
Discharge: HOME | End: 2022-04-18
Payer: COMMERCIAL

## 2022-04-18 PROCEDURE — 97110 THERAPEUTIC EXERCISES: CPT

## 2022-04-18 PROCEDURE — 97112 NEUROMUSCULAR REEDUCATION: CPT

## 2022-04-18 PROCEDURE — 97530 THERAPEUTIC ACTIVITIES: CPT

## 2022-04-18 NOTE — FLOWSHEET NOTE
[facility] a Department of [address]  [facility] [address]  Office: [phone]  Fax:  [phone]        Physical Therapy Treatment Note/ Progress Report:            Date:  2022    Patient Name:  Blessing Diego    :    MRN: [id number]  Restrictions/Precautions:    Medical/Treatment Diagnosis Information:  · Diagnosis: s/p left shoulder biceps tenodesis. Surgery on 22;  M25.512- left shoulder pain  · Treatment Diagnosis: M25.512- left shoulder pain  Insurance/Certification information:  PT Insurance Information: Chana  Physician Information:  Referring Practitioner: Hilda Mackay  Has the plan of care been signed (Y/N):        []  Yes  [x]  No     Date of Patient follow up with Physician: prn      Is this a Progress Report:     [x]  Yes  []  No        If Yes:  Date Range for reporting period:  Beginning 2022  Ending     Progress report will be due (19 Rx or 30 days whichever is less): 4 May 2022      Visit # Insurance Allowable Auth Required   18 auto []  Yes [x]  No        Functional Scale: UEFI-98.75%   Date assessed: 2022      Pain level: 0/10    SUBJECTIVE:  Pt is doing well. Swung golf clubs last week, irons and wedges only, with good tolerance, states that core/trunk was sore without UE symptoms. Has not performed OH pressing, however, incline DB press \"feels really good\". OBJECTIVE:    Observation:     Test measurements:          ROM PROM AROM  Comment    L R L R    Flexion   171    Abduction   192    ER   95    IR   43    Other: Scaption        Other             Strength L R Comment   Flexion 4+     Abduction 4     ER 4+     IR 4+     Supraspinatus      Upper Trap      Lower Trap      Mid Trap      Rhomboids      Biceps 4+ to 5     Triceps      Horizontal Abduction      Horizontal Adduction      Lats          RESTRICTIONS/PRECAUTIONS: full motion of shoulder. NWB.  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         Exercises/Interventions:   Exercise/Equipment Resistance/Repetitions Other comments   Aerobic Conditioning     Aerodyne          Stretching/PROM     Supine Wand      Table Slides Wall slides  Walks 10x:10 each  /ABD  Scaption with lift off   2x15     Wall Slide to ECC ABD      UE [place] Ext with ant shoulder blocking      1/2 Kneeling Latissimus stretch with dynaband  CC \"Doorway Hang\" Lat Stretch     UE [place]    AAROM ABD      Pulleys    Pendulum     BB IR     SL IR 10x:10 On wall        Pec doorway stretch 3x:30    CBA stretch     UT stretch     LS stretch          Isometrics     Retraction     Scap Stabs on wall     Weight shift     SA Roller Wall Slide  Lime Loop @ wrist   2x15     Flexion     Abduction     External Rotation     Internal Rotation     Biceps     Triceps          PRE's     Flexion     Abduction 3x10 10# Standing scaption   External Rotation S/L 1x10          1x8    7#     Internal Rotation     Shrugs 2x15 25#    EXT     Reverse Flys     Serratus     Serratus  Lift  Horizontal Abd with ER Prone 3x10 5# DB    Lower Trap  Prone 3x10 5#    Biceps 1x15  1x10  1x5  25#4/18-Fatigue with second set    Triceps     Hi/Lo Push ups to +      Retraction     Bench press  With plus   Shoulder ext     UK deltoid     Prone Cone lift off with cones at 9/3 and 10/2     Prone Row to ER to [place]  Off ball             Cable Column/Theraband     External Rotation    Internal Rotation 2x15 35#   Shrugs     Lats     Ext     Flex     High Vaughn Row  2x15 70# hold with single arm row    Scapular Retraction    BIC     TRIC     PNF     Bird dog rows  Hand on round rockerboard                  Dynamic Stability     Rhythmic Stabs  Ball on wall 4 way 3x20 7#    Wall michelle 3x5    Body blade 4x:20 IR/ER  3x10 circles in flex  3x:20 Scaption          TRX Push Ups  Inclined 2x15     Side Plank on elevated Hi/Lo  28\" platform  30\"x3     SB push up hold on wall 10x:10    Pull through plank with 15# 3x10    Prone T ball drop 4x:20 Green in left, red in right   BOSU plank with rocks AP and LR 30x ea              P[place]oback          Manual    Prone Latissimus STM      IASTM anterolateral shoulder             Access Code: Sienna Hull  URL: [email or web address]  Date: 02/08/2022  Prepared by: Efe Ruano Cessna    Exercises  Seated Gripping Towel - 2 x daily - 7 x weekly - 3 reps - 1 sets - 2-3 hold  Seated Elbow Flexion AAROM - 2 x daily - 7 x weekly - 10 sets - 10 hold  Seated Scapular Retraction - 2 x daily - 7 x weekly - 10 reps - 10 hold  Seated Shoulder Shrugs - 2 x daily - 7 x weekly - 10 reps - 3 sets  Seated Shoulder Flexion Towel Slide at Table Top - 2 x daily - 7 x weekly - 10 reps - 10 hold  Seated Shoulder Scaption Slide at Table Top with Forearm in Neutral - 2 x daily - 7 x weekly - 10 reps - 10 hold  Seated Shoulder External Rotation AAROM with Cane and Hand in Neutral - 2 x daily - 7 x weekly - 10 reps - 10 hold    2/16/22: Hiral Farooq, PTA   Supine Shoulder External Rotation in 45 Degrees Abduction AAROM with Dowel - 2 x daily - 7 x weekly - 10 reps - 10 hold  Supine Shoulder Flexion Extension AAROM with Dowel - 2 x daily - 7 x weekly - 10 reps - 10 hold  Standing Shoulder Internal Rotation Stretch with Towel - 2 x daily - 7 x weekly - 10 reps - 10 hold  Supine Scapular Protraction in Flexion with Dumbbells - 1 x daily - 7 x weekly - 3 sets - 10 reps    Access Code: Sienna Hull  URL: [email or web address]  Date: 03/02/2022  Prepared by: Madi Romano  Exercises  Shoulder Flexion Wall Slide with Towel - 1 x daily - 7 x weekly - 3 sets - 10 reps  Standing Shoulder Abduction Slides at Wall - 1 x daily - 7 x weekly - 3 sets - 10 reps  Seated Scapular Retraction - 2 x daily - 7 x weekly - 10 reps - 10 hold  Seated Shoulder Shrugs - 2 x daily - 7 x weekly - 10 reps - 3 sets  Supine Shoulder External Rotation in 45 Degrees Abduction AAROM with Dowel - 2 x daily - 7 x weekly - 10 reps - 10 hold  Standing Shoulder Internal Rotation Stretch with Towel - 2 x daily - 7 x weekly - 10 reps - 10 hold  Supine Scapular Protraction in Flexion with Dumbbells - 1 x daily - 7 x weekly - 3 sets - 10 reps  Sidelying Shoulder External Rotation with Dumbbell - 1 x daily - 7 x weekly - 3 sets - 10 reps  Prone Shoulder Extension - Single Arm with Dumbbell - 1 x daily - 7 x weekly - 3 sets - 10 reps  Prone Single Arm Shoulder Horizontal Abduction with Dumbbell - Palm Down - 1 x daily - 7 x weekly - 3 sets - 10 reps      Therapeutic Exercise and NMR EXR  [x] (50559) Provided verbal/tactile cueing for activities related to strengthening, flexibility, endurance, ROM  for improvements in scapular, scapulothoracic and UE control with self care, reaching, carrying, lifting, house/yardwork, driving/computer work. [x] (36954) Provided verbal/tactile cueing for activities related to improving balance, coordination, kinesthetic sense, posture, motor skill, proprioception  to assist with  scapular, scapulothoracic and UE control with self care, reaching, carrying, lifting, house/yardwork, driving/computer work. Therapeutic Activities:    [x] (22535 or 32109) Provided verbal/tactile cueing for activities related to improving balance, coordination, kinesthetic sense, posture, motor skill, proprioception and motor activation to allow for proper function of scapular, scapulothoracic and UE control with self care, carrying, lifting, driving/computer work.      Home Exercise Program:    [x] (83554) Reviewed/Progressed HEP activities related to strengthening, flexibility, endurance, ROM of scapular, scapulothoracic and UE control with self care, reaching, carrying, lifting, house/yardwork, driving/computer work  [] (43633) Reviewed/Progressed HEP activities related to improving balance, coordination, kinesthetic sense, posture, motor skill, proprioception of scapular, scapulothoracic and UE control with self care, reaching, carrying, lifting, house/yardwork, driving/computer work      Manual Treatments:  PROM / STM / Oscillations-Mobs:  G-I, II, III, IV (PA's, Inf., Post.)  [x] (19554) Provided manual therapy to mobilize soft tissue/joints of cervical/CT, scapular GHJ and UE for the purpose of modulating pain, promoting relaxation,  increasing ROM, reducing/eliminating soft tissue swelling/inflammation/restriction, improving soft tissue extensibility and allowing for proper ROM for normal function with self care, reaching, carrying, lifting, house/yardwork, driving/computer work    Pt Education:     Modalities: declined    Charges:   Timed Code Treatment Minutes: 64'   Total Treatment Minutes: 60'     [] EVAL (LOW) 455 [id number]   [] EVAL (MOD) 38980   [] EVAL (HIGH) 80041   [] RE-EVAL   [x] RL(21995) x  1   [] IONTO  [x] NMR (16778) x  2   [] VASO  [] Manual (04928) x      [] Other:  [x] TA x  1    [] Mech Traction (39602)  [] ES(attended) (05828)      [] ES (un) (23234):     GOALS:   Patient stated goal: return to normal activity    [x] Progressing: [] Met: [] Not Met: [] Adjusted     Therapist goals for Patient:   Short Term Goals: To be achieved in: 2 weeks  1. Independent in HEP and progression per patient tolerance, in order to prevent re-injury. [] Progressing: [x] Met: [] Not Met: [] Adjusted   2. Patient will report pain at worst less than or equal to 4/10. [] Progressing: [x] Met: [] Not Met: [] Adjusted     Long Term Goals: To be achieved in: 16 weeks  1. Pt will demo UEFI of 90% or better to assist with reaching prior level of function. [] Progressing: [x] Met: [] Not Met: [] Adjusted  2. Patient will demonstrate increased AROM to shoulder flex greater than or equal to 170, ABD greater than or equal to 170, ER greater than or equal to 80, IR greater than or equal to 50 to allow for proper joint functioning as indicated by patients Functional Deficits. [x] Progressing: [] Met: [] Not Met: [] Adjusted  3.  Patient will demonstrate an increase in strength to shoulder flex greater than or equal to 4+, ABD greater than or equal to 4+, ER greater than or equal to 4, IR greater than or equal to 4+ to allow for proper functional mobility as indicated by patients functional deficits. [x] Progressing: [] Met: [] Not Met: [] Adjusted  4. Patient will return to performing all self care without increased symptoms or restriction. [] Progressing: [x] Met: [] Not Met: [] Adjusted  5. Pt will report pain at worst less than or equal to 2/10. [x] Progressing: [] Met: [] Not Met: [] Adjusted  6. Pt will return to his normal workout routine. [x] Progressing: [] Met: [] Not Met: [] Adjusted           Overall Progression Towards Functional goals/ Treatment Progress Update:  [x] Patient is progressing as expected towards functional goals listed. [] Progression is slowed due to complexities/Impairments listed. [] Progression has been slowed due to co-morbidities. [] Plan just implemented, too soon to assess goals progression <30days   [] Goals require adjustment due to lack of progress  [] Patient is not progressing as expected and requires additional follow up with physician  [] Other    Prognosis for POC: [x] Good [] Fair  [] Poor      Patient requires continued skilled intervention: [x] Yes  [] No    Treatment/Activity Tolerance:  [x] Patient able to complete treatment  [] Patient limited by fatigue  [] Patient limited by pain     [] Patient limited by other medical complications  [] Other: Pt grace tx well. Attempted to increase TUT with increased demand during working sets and pt fatigued quickly in bicep and ER RTC musculature as sets were modified per tolerance. Conversations had with pt regarding increasing WBing demand with side plank positions and how to gradually increase intensity with good understanding reciprocated.  Pt advised to hold for another week with OH pressing, and will trial in PT before releasing to independent activity. Pt progressing well through POC and with home strengthening and ROM will be re-assessed at [facility]. Pt would continue to benefit from skilled PT to improve strength, ROM/flexibility, and function. PLAN: If pt doesn't return, this note can be considered a D/C note. Progress strength per pt grace.     [x] Continue per plan of care [] Alter current plan (see comments above)  [] Plan of care initiated [] Hold pending MD visit [] Discharge    Electronically signed by: CLAUDIA Vieira,[id number]

## 2022-04-20 ENCOUNTER — APPOINTMENT (OUTPATIENT)
Dept: PHYSICAL THERAPY | Age: 47
End: 2022-04-20
Payer: COMMERCIAL

## 2022-04-27 ENCOUNTER — HOSPITAL ENCOUNTER (OUTPATIENT)
Dept: PHYSICAL THERAPY | Age: 47
Setting detail: THERAPIES SERIES
Discharge: HOME | End: 2022-04-27
Payer: COMMERCIAL

## 2022-04-27 PROCEDURE — 97110 THERAPEUTIC EXERCISES: CPT

## 2022-04-27 PROCEDURE — 97530 THERAPEUTIC ACTIVITIES: CPT

## 2022-04-27 NOTE — PROGRESS NOTES
[facility] a Department of [address]  [address], [address]  Office: [phone]  Fax:  [phone]        Physical Therapy Treatment Note/ Progress Report:       Physical Therapy Re-Certification Plan of Care    Dear Dr. Ramona Michaud,    We had the pleasure of treating the following patient for physical therapy services at [address] [place] [address]. A summary of our findings can be found in the updated assessment below. This includes our plan of care. If you have any questions or concerns regarding these findings, please do not hesitate to contact me at the office phone number checked above. Thank you for the referral.     Physician Signature:________________________________Date:__________________  By signing above (or electronic signature), therapists plan is approved by physician    Date Range Of Visits: 2/8/22-4/29/2022  Total Visits to Date: 23  Overall Response to Treatment:   [] Patient is responding well to treatment and improvement is noted with regards to goals   [] Patient should continue to improve in reasonable time if they continue HEP   [] Patient has plateaued and is no longer responding to skilled PT intervention    [] Patient is getting worse and would benefit from return to referring MD   [] Patient unable to adhere to initial POC   [x] Other: Pt grace tx well. Pt progressed well through POC and was able to meet all LTG's at this date. Objective measures obtained this date and pt demo's appropriate understanding of continued strengthening to perform outside of skilled therapy. Pt reports full return to PLOF, sans [employer] as they cannot participate in until they are off of blood thinners later this year. At this date, pt and clinician feel that D/C to HEP is appropriate. All questions were answered and pt provided with clinic/PT contact if questions or concerns arise.   Pt is independent and comfortable with progressions of gym routine. Date:  2022    Patient Name:  Johnny Garcia    :    MRN: [id number]  Restrictions/Precautions:    Medical/Treatment Diagnosis Information:  Diagnosis: s/p left shoulder biceps tenodesis. Surgery on 22;  M25.512- left shoulder pain  Treatment Diagnosis: M25.512- left shoulder pain  Insurance/Certification information:  PT Insurance Information: Parisa Morris  Physician Information:  Referring Practitioner: Kacy Dominguez  Has the plan of care been signed (Y/N):        []  Yes  [x]  No     Date of Patient follow up with Physician: prn      Is this a Progress Report:     [x]  Yes  []  No        If Yes:  Date Range for reporting period:  Beginning 2022  Ending     Progress report will be due (19 Rx or 30 days whichever is less): 4 May 2022      Visit # Insurance Allowable Auth Required   19 auto []  Yes [x]  No        Functional Scale: UEFI-98.75%   Date assessed: 2022  UEFI- 100%       2022      Pain level: 0/10    SUBJECTIVE:  Pt has now golfed 2x since last visit without dysfunction or increased symptoms. Gradually progressing lifting in gym with light wts utilized for chest/incline pressing. Pain is present for a brief second upon waking, but pt reports 0/10 pain over the past week without increased symptoms. OBJECTIVE:   Observation:    Test measurements:         ROM PROM AROM  Comment    L R L R    Flexion   175    Abduction   180    ER   100    IR   75    Other: Scaption        Other             Strength L R Comment   Flexion 5     Abduction 5     ER 4+ to 5      IR 5     Supraspinatus      Upper Trap      Lower Trap      Mid Trap      Rhomboids      Biceps 5     Triceps      Horizontal Abduction      Horizontal Adduction      Lats          RESTRICTIONS/PRECAUTIONS: full motion of shoulder. NWB. 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         Exercises/Interventions:   Exercise/Equipment Resistance/Repetitions Other comments   Aerobic Conditioning     Aerodyne          Stretching/PROM     Supine Wand      Table Slides Wall slides  Walks 10x:10 each  /ABD  Scaption with lift off   2x15     Wall Slide to ECC ABD      UE Cornucopia Ext with ant shoulder blocking      1/2 Kneeling Latissimus stretch with dynaband  CC \"Doorway Hang\" Lat Stretch     UE Cornucopia    AAROM ABD      Pulleys    Pendulum     BB IR     SL IR 10x:10 On wall        Pec doorway stretch 3x:30    CBA stretch     UT stretch     LS stretch          Isometrics     Retraction     Scap Stabs on wall     Weight shift     SA Roller Wall Slide     Flexion     Abduction     External Rotation     Internal Rotation     Biceps     Triceps          PRE's     Flexion     Abduction Standing scaption   External Rotation S/L 2x10         7#     Internal Rotation     Shrugs 2x15 25#     EXT     Reverse Flys     Serratus     Serratus  Lift  Horizontal Abd with ER    Lower Trap  Biceps 2x15 25# 4/18-Fatigue with second set    Triceps     Hi/Lo Push ups to +      Retraction     Bench press With plus   Shoulder ext    UK deltoid    Prone Cone lift off with cones at 9/3 and 10/2    Prone Row to ER to [place] Off ball             Cable Column/Theraband     External Rotation    Internal Rotation    Shrugs     Lats     Ext     Flex     High [place] Row     Scapular Retraction    BIC     TRIC    PNF     Bird dog rows Hand on round rockerboard                  Dynamic Stability     Rhythmic Stabs  Ball on wall 4 way    Wall michelle 3x5    Body blade        TRX Push Ups  Inclined 2x15     TRX Rev Flys  TRX plank with OH reach  2x10   1x15 R/L     Side Plank on elevated Hi/Lo  28\" platform  30\"x3     SB push up hold on wall 10x:10 4/27-with manual perturbations   Pull through plank with 15#    Prone T ball drop Green in left, red in right   BOSU plank with rocks AP and LR 30x2 ea              Plyoback          Manual Prone Latissimus STM      IASTM anterolateral shoulder             Access Code: [email or web address]  URL: [email or web address]  Date: 02/08/2022  Prepared by: Rody Baezna    Exercises  Seated Gripping Towel - 2 x daily - 7 x weekly - 3 reps - 1 sets - 2-3 hold  Seated Elbow Flexion AAROM - 2 x daily - 7 x weekly - 10 sets - 10 hold  Seated Scapular Retraction - 2 x daily - 7 x weekly - 10 reps - 10 hold  Seated Shoulder Shrugs - 2 x daily - 7 x weekly - 10 reps - 3 sets  Seated Shoulder Flexion Towel Slide at Table Top - 2 x daily - 7 x weekly - 10 reps - 10 hold  Seated Shoulder Scaption Slide at Table Top with Forearm in Neutral - 2 x daily - 7 x weekly - 10 reps - 10 hold  Seated Shoulder External Rotation AAROM with Cane and Hand in Neutral - 2 x daily - 7 x weekly - 10 reps - 10 hold    2/16/22: Himanshu Giron, EDMOND   Supine Shoulder External Rotation in 45 Degrees Abduction AAROM with Dowel - 2 x daily - 7 x weekly - 10 reps - 10 hold  Supine Shoulder Flexion Extension AAROM with Dowel - 2 x daily - 7 x weekly - 10 reps - 10 hold  Standing Shoulder Internal Rotation Stretch with Towel - 2 x daily - 7 x weekly - 10 reps - 10 hold  Supine Scapular Protraction in Flexion with Dumbbells - 1 x daily - 7 x weekly - 3 sets - 10 reps    Access Code: Louise Mohan  URL: [email or web address]  Date: 03/02/2022  Prepared by: Wellington Acosta  Exercises  Shoulder Flexion Wall Slide with Towel - 1 x daily - 7 x weekly - 3 sets - 10 reps  Standing Shoulder Abduction Slides at Wall - 1 x daily - 7 x weekly - 3 sets - 10 reps  Seated Scapular Retraction - 2 x daily - 7 x weekly - 10 reps - 10 hold  Seated Shoulder Shrugs - 2 x daily - 7 x weekly - 10 reps - 3 sets  Supine Shoulder External Rotation in 45 Degrees Abduction AAROM with Dowel - 2 x daily - 7 x weekly - 10 reps - 10 hold  Standing Shoulder Internal Rotation Stretch with Towel - 2 x daily - 7 x weekly - 10 reps - 10 hold  Supine Scapular Protraction in Flexion with Dumbbells - 1 x daily - 7 x weekly - 3 sets - 10 reps  Sidelying Shoulder External Rotation with Dumbbell - 1 x daily - 7 x weekly - 3 sets - 10 reps  Prone Shoulder Extension - Single Arm with Dumbbell - 1 x daily - 7 x weekly - 3 sets - 10 reps  Prone Single Arm Shoulder Horizontal Abduction with Dumbbell - Palm Down - 1 x daily - 7 x weekly - 3 sets - 10 reps      Therapeutic Exercise and NMR EXR  [x] (92522) Provided verbal/tactile cueing for activities related to strengthening, flexibility, endurance, ROM  for improvements in scapular, scapulothoracic and UE control with self care, reaching, carrying, lifting, house/yardwork, driving/computer work. [x] (26839) Provided verbal/tactile cueing for activities related to improving balance, coordination, kinesthetic sense, posture, motor skill, proprioception  to assist with  scapular, scapulothoracic and UE control with self care, reaching, carrying, lifting, house/yardwork, driving/computer work. Therapeutic Activities:    [x] (48601 or 61849) Provided verbal/tactile cueing for activities related to improving balance, coordination, kinesthetic sense, posture, motor skill, proprioception and motor activation to allow for proper function of scapular, scapulothoracic and UE control with self care, carrying, lifting, driving/computer work.      Home Exercise Program:    [x] (48992) Reviewed/Progressed HEP activities related to strengthening, flexibility, endurance, ROM of scapular, scapulothoracic and UE control with self care, reaching, carrying, lifting, house/yardwork, driving/computer work  [] (61704) Reviewed/Progressed HEP activities related to improving balance, coordination, kinesthetic sense, posture, motor skill, proprioception of scapular, scapulothoracic and UE control with self care, reaching, carrying, lifting, house/yardwork, driving/computer work      Manual Treatments:  PROM / STM / Oscillations-Mobs:  G-I, II, III, IV (PA's, Inf., Post.)  [] (16768) Provided manual therapy to mobilize soft tissue/joints of cervical/CT, scapular GHJ and UE for the purpose of modulating pain, promoting relaxation,  increasing ROM, reducing/eliminating soft tissue swelling/inflammation/restriction, improving soft tissue extensibility and allowing for proper ROM for normal function with self care, reaching, carrying, lifting, house/yardwork, driving/computer work    Pt Education: Progressions with gym lifts, chops and lifts to replicate golf swing, STM to continue to ant chest/distal bicep. Soreness intensity to modify exercise intensity. Modalities: declined    Charges:   Timed Code Treatment Minutes: 30'   Total Treatment Minutes: 28'     [] EVAL (LOW) 455 1011   [] EVAL (MOD) 25796   [] EVAL (HIGH) 99293   [] RE-EVAL   [x] HO(77290) x  1   [] IONTO  [] NMR (04979) x    [] VASO  [] Manual (81362) x      [] Other:  [x] TA x  1    [] Mech Traction (90373)  [] ES(attended) (47049)      [] ES (un) (98727):     GOALS:   Patient stated goal: return to normal activity    [x] Progressing: [] Met: [] Not Met: [] Adjusted     Therapist goals for Patient:   Short Term Goals: To be achieved in: 2 weeks  1. Independent in HEP and progression per patient tolerance, in order to prevent re-injury. [] Progressing: [x] Met: [] Not Met: [] Adjusted   2. Patient will report pain at worst less than or equal to 4/10. [] Progressing: [x] Met: [] Not Met: [] Adjusted     Long Term Goals: To be achieved in: 16 weeks  1. Pt will demo UEFI of 90% or better to assist with reaching prior level of function. [] Progressing: [x] Met: [] Not Met: [] Adjusted  2. Patient will demonstrate increased AROM to shoulder flex greater than or equal to 170, ABD greater than or equal to 170, ER greater than or equal to 80, IR greater than or equal to 50 to allow for proper joint functioning as indicated by patients Functional Deficits. [] Progressing: [x] Met: [] Not Met: [] Adjusted  3.  Patient will demonstrate an increase in strength to shoulder flex greater than or equal to 4+, ABD greater than or equal to 4+, ER greater than or equal to 4, IR greater than or equal to 4+ to allow for proper functional mobility as indicated by patients functional deficits. [] Progressing: [x] Met: [] Not Met: [] Adjusted  4. Patient will return to performing all self care without increased symptoms or restriction. [] Progressing: [x] Met: [] Not Met: [] Adjusted  5. Pt will report pain at worst less than or equal to 2/10. [] Progressing: [x] Met: [] Not Met: [] Adjusted  6. Pt will return to his normal workout routine. [] Progressing: [x] Met: [] Not Met: [] Adjusted           Overall Progression Towards Functional goals/ Treatment Progress Update:  [x] Patient is progressing as expected towards functional goals listed. [] Progression is slowed due to complexities/Impairments listed. [] Progression has been slowed due to co-morbidities. [] Plan just implemented, too soon to assess goals progression <30days   [] Goals require adjustment due to lack of progress  [] Patient is not progressing as expected and requires additional follow up with physician  [] Other    Prognosis for POC: [x] Good [] Fair  [] Poor      Patient requires continued skilled intervention: [] Yes  [x] No    Treatment/Activity Tolerance:  [x] Patient able to complete treatment  [] Patient limited by fatigue  [] Patient limited by pain     [] Patient limited by other medical complications  [] Other: Pt grace tx well. Pt progressed well through POC and was able to meet all LTG's at this date. Objective measures obtained this date and pt demo's appropriate understanding of continued strengthening to perform outside of skilled therapy. Pt reports full return to PLOF, [facility] [employer] as they cannot participate in until they are off of blood thinners later this year. At this date, pt and clinician feel that D/C to HEP is appropriate. All questions were answered and pt provided with clinic/PT contact if questions or concerns arise. PLAN: D/C to HEP    [] Continue per plan of care [] Alter current plan (see comments above)  [] Plan of care initiated [] Hold pending MD visit [x] Discharge    Electronically signed by: ISEAL Sanford,[id number]      D/C performed by AMY Wade [place], DPT [id number]
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
## 2022-05-26 ENCOUNTER — OFFICE VISIT (OUTPATIENT)
Dept: FAMILY MEDICINE CLINIC | Age: 47
End: 2022-05-26
Payer: COMMERCIAL

## 2022-05-26 ENCOUNTER — HOSPITAL ENCOUNTER (OUTPATIENT)
Dept: GENERAL RADIOLOGY | Age: 47
Discharge: HOME | End: 2022-05-26
Payer: COMMERCIAL

## 2022-05-26 ENCOUNTER — HOSPITAL ENCOUNTER (OUTPATIENT)
Age: 47
Discharge: HOME | End: 2022-05-26
Payer: COMMERCIAL

## 2022-05-26 VITALS
WEIGHT: 191.2 LBS | SYSTOLIC BLOOD PRESSURE: 120 MMHG | DIASTOLIC BLOOD PRESSURE: 88 MMHG | BODY MASS INDEX: 25.9 KG/M2 | HEIGHT: 72 IN

## 2022-05-26 DIAGNOSIS — M54.2 NECK PAIN: Primary | ICD-10-CM

## 2022-05-26 DIAGNOSIS — M54.2 NECK PAIN: ICD-10-CM

## 2022-05-26 DIAGNOSIS — Z00.00 PREVENTATIVE HEALTH CARE: ICD-10-CM

## 2022-05-26 PROCEDURE — 99213 OFFICE O/P EST LOW 20 MIN: CPT | Performed by: FAMILY MEDICINE

## 2022-05-26 PROCEDURE — 72040 X-RAY EXAM NECK SPINE 2-3 VW: CPT

## 2022-05-26 RX ORDER — METHYLPREDNISOLONE 4 MG/1
TABLET ORAL
Qty: 21 TABLET | Refills: 0 | Status: SHIPPED | OUTPATIENT
Start: 2022-05-26 | End: 2022-10-31

## 2022-05-26 NOTE — PATIENT INSTRUCTIONS
Patient Education        Neck: Exercises  Introduction  Here are some examples of exercises for you to try. The exercises may be suggested for a condition or for rehabilitation. Start each exercise slowly. Ease off the exercises if you start to have pain. You will be told when to start these exercises and which ones will work bestfor you. How to do the exercises  Neck stretch    1. This stretch works best if you keep your shoulder down as you lean away from it. To help you remember to do this, start by relaxing your shoulders and lightly holding on to your thighs or your chair. 2. Tilt your head toward your shoulder and hold for 15 to 30 seconds. Let the weight of your head stretch your muscles. 3. If you would like a little added stretch, use your hand to gently and steadily pull your head toward your shoulder. For example, keeping your right shoulder down, lean your head to the left. 4. Repeat 2 to 4 times toward each shoulder. Diagonal neck stretch    1. Turn your head slightly toward the direction you will be stretching, and tilt your head diagonally toward your chest and hold for 15 to 30 seconds. 2. If you would like a little added stretch, use your hand to gently and steadily pull your head forward on the diagonal.  3. Repeat 2 to 4 times toward each side. Dorsal glide stretch    The dorsal glide stretches the back of the neck. If you feel pain, do not glide so far back. Some people find this exercise easier to do while lying on theirbacks with an ice pack on the neck. 1. Sit or stand tall and look straight ahead. 2. Slowly tuck your chin as you glide your head backward over your body  3. Hold for a count of 6, and then relax for up to 10 seconds. 4. Repeat 8 to 12 times. Chest and shoulder stretch    1. Sit or stand tall and glide your head backward as in the dorsal glide stretch. 2. Raise both arms so that your hands are next to your ears.   3. Take a deep breath, and as you breathe out, lower your elbows down and behind your back. You will feel your shoulder blades slide down and together, and at the same time you will feel a stretch across your chest and the front of your shoulders. 4. Hold for about 6 seconds, and then relax for up to 10 seconds. 5. Repeat 8 to 12 times. Strengthening: Hands on head    1. Move your head backward, forward, and side to side against gentle pressure from your hands, holding each position for about 6 seconds. 2. Repeat 8 to 12 times. Follow-up care is a key part of your treatment and safety. Be sure to make and go to all appointments, and call your doctor if you are having problems. It's also a good idea to know your test results and keep alist of the medicines you take. Where can you learn more? Go to [email or web address].[email or web address] org and sign in to your Direct Hit account. Enter P975 in the Mintigo box to learn more about \"Neck: Exercises. \"     If you do not have an account, please click on the \"Sign Up Now\" link. Current as of: July 1, 2021               Content Version: 13.2  © 8532-1202 [employer]wise, Incorporated. Care instructions adapted under license by [facility] ([facility]). If you have questions about a medical condition or this instruction, always ask your healthcare professional. [address] any warranty or liability for your use of this information.

## 2022-06-17 DIAGNOSIS — M54.12 CERVICAL RADICULOPATHY: ICD-10-CM

## 2022-06-17 DIAGNOSIS — M50.30 DDD (DEGENERATIVE DISC DISEASE), CERVICAL: Primary | ICD-10-CM

## 2022-07-13 ENCOUNTER — HOSPITAL ENCOUNTER (OUTPATIENT)
Dept: MRI IMAGING | Age: 47
Discharge: HOME | End: 2022-07-13
Payer: OTHER MISCELLANEOUS

## 2022-07-13 DIAGNOSIS — M50.30 DDD (DEGENERATIVE DISC DISEASE), CERVICAL: ICD-10-CM

## 2022-07-13 DIAGNOSIS — M54.12 CERVICAL RADICULOPATHY: ICD-10-CM

## 2022-07-13 PROCEDURE — 72141 MRI NECK SPINE W/O DYE: CPT

## 2022-07-14 DIAGNOSIS — M50.30 DDD (DEGENERATIVE DISC DISEASE), CERVICAL: Primary | ICD-10-CM

## 2022-10-26 ENCOUNTER — TELEPHONE (OUTPATIENT)
Dept: ENDOCRINOLOGY | Age: 47
End: 2022-10-26

## 2022-10-26 DIAGNOSIS — R79.89 LOW TESTOSTERONE IN MALE: Primary | ICD-10-CM

## 2022-10-31 RX ORDER — SILDENAFIL CITRATE 20 MG/1
20-40 TABLET ORAL DAILY PRN
Qty: 20 TABLET | Refills: 0 | Status: SHIPPED | OUTPATIENT
Start: 2022-10-31

## 2022-10-31 NOTE — TELEPHONE ENCOUNTER
Last office visit 5/26/2022     Last written      Next office visit scheduled Visit date not found    Requested Prescriptions     Pending Prescriptions Disp Refills    sildenafil (REVATIO) 20 MG tablet 20 tablet 5     Sig: Take 1-2 tablets by mouth daily as needed (erectile dysfunction)

## 2022-11-03 ENCOUNTER — OFFICE VISIT (OUTPATIENT)
Dept: FAMILY MEDICINE CLINIC | Age: 47
End: 2022-11-03
Payer: COMMERCIAL

## 2022-11-03 VITALS
HEART RATE: 74 BPM | BODY MASS INDEX: 26.45 KG/M2 | WEIGHT: 195.3 LBS | HEIGHT: 72 IN | DIASTOLIC BLOOD PRESSURE: 80 MMHG | SYSTOLIC BLOOD PRESSURE: 122 MMHG

## 2022-11-03 DIAGNOSIS — F41.9 ANXIETY: ICD-10-CM

## 2022-11-03 DIAGNOSIS — I26.99 RIGHT PULMONARY EMBOLUS (HCC): Primary | ICD-10-CM

## 2022-11-03 DIAGNOSIS — Z23 NEED FOR INFLUENZA VACCINATION: ICD-10-CM

## 2022-11-03 DIAGNOSIS — N40.1 BPH WITH URINARY OBSTRUCTION: ICD-10-CM

## 2022-11-03 DIAGNOSIS — N13.8 BPH WITH URINARY OBSTRUCTION: ICD-10-CM

## 2022-11-03 DIAGNOSIS — Z12.11 SCREEN FOR COLON CANCER: ICD-10-CM

## 2022-11-03 DIAGNOSIS — N52.9 ERECTILE DYSFUNCTION OF ORGANIC ORIGIN: ICD-10-CM

## 2022-11-03 DIAGNOSIS — B00.89 HERPES DERMATITIS: ICD-10-CM

## 2022-11-03 DIAGNOSIS — E34.9 TESTOSTERONE DEFICIENCY: ICD-10-CM

## 2022-11-03 PROCEDURE — 99213 OFFICE O/P EST LOW 20 MIN: CPT | Performed by: FAMILY MEDICINE

## 2022-11-03 RX ORDER — ALPRAZOLAM 0.5 MG/1
0.5 TABLET ORAL 3 TIMES DAILY PRN
Qty: 21 TABLET | Refills: 0 | Status: SHIPPED | OUTPATIENT
Start: 2022-11-03 | End: 2022-12-03

## 2022-11-03 ASSESSMENT — PATIENT HEALTH QUESTIONNAIRE - PHQ9
4. FEELING TIRED OR HAVING LITTLE ENERGY: 1
8. MOVING OR SPEAKING SO SLOWLY THAT OTHER PEOPLE COULD HAVE NOTICED. OR THE OPPOSITE, BEING SO FIGETY OR RESTLESS THAT YOU HAVE BEEN MOVING AROUND A LOT MORE THAN USUAL: 0
5. POOR APPETITE OR OVEREATING: 0
7. TROUBLE CONCENTRATING ON THINGS, SUCH AS READING THE NEWSPAPER OR WATCHING TELEVISION: 0
SUM OF ALL RESPONSES TO PHQ QUESTIONS 1-9: 1
1. LITTLE INTEREST OR PLEASURE IN DOING THINGS: 0
6. FEELING BAD ABOUT YOURSELF - OR THAT YOU ARE A FAILURE OR HAVE LET YOURSELF OR YOUR FAMILY DOWN: 0
2. FEELING DOWN, DEPRESSED OR HOPELESS: 0
SUM OF ALL RESPONSES TO PHQ QUESTIONS 1-9: 1
10. IF YOU CHECKED OFF ANY PROBLEMS, HOW DIFFICULT HAVE THESE PROBLEMS MADE IT FOR YOU TO DO YOUR WORK, TAKE CARE OF THINGS AT HOME, OR GET ALONG WITH OTHER PEOPLE: 0
SUM OF ALL RESPONSES TO PHQ QUESTIONS 1-9: 1
3. TROUBLE FALLING OR STAYING ASLEEP: 0
SUM OF ALL RESPONSES TO PHQ9 QUESTIONS 1 & 2: 0
9. THOUGHTS THAT YOU WOULD BE BETTER OFF DEAD, OR OF HURTING YOURSELF: 0
SUM OF ALL RESPONSES TO PHQ QUESTIONS 1-9: 1

## 2022-11-03 NOTE — PROGRESS NOTES
Subjective:      Patient ID: Adalid Perkins is a 40 y.o. male. HPI    Anxiety:  Patient takes Xanax 0.5 mg TID prn. He takes it very seldom and feels that it works well when taken. Testosterone Deficiency:  Patient was previously treated with testosterone injections which were stopped after his PE. He now complains of decreased libido and will be seeing Dr. Marian Shaikh again in January. He needs to have his testosterone level done now. Pulmonary Embolus:  Patient had an acute right lung PE on 2-10-22. He had a normal venous doppler and TTE. He had a blood work-up for hypercoagulable state and saw Dr. Ximena Gilliam. He finished 6 months on Eliquis and has no further issues. BPH:  Patient stopped Flomax, stating that it did not agree with his stomach. He only gets up through the night if he drinks more fluids. Erectile Dysfunction:  Patient takes Sildenafil 20-40 mg daily as needed. He feels that the medication works well when taken. Herpes Dermatitis:  Patient takes Valtrex 1 g tablets two BID for one day for outbreaks. He feels that the medication works well when taken. Review of Systems  /80   Pulse 74   Ht 6' (1.829 m)   Wt 195 lb 4.8 oz (88.6 kg)   BMI 26.49 kg/m²    Objective:   Physical Exam  Vitals reviewed. Constitutional:       General: He is not in acute distress. Appearance: He is well-developed. HENT:      Head: Normocephalic. Right Ear: External ear normal.      Left Ear: External ear normal.   Neck:      Thyroid: No thyromegaly. Vascular: No carotid bruit or JVD. Cardiovascular:      Rate and Rhythm: Normal rate and regular rhythm. Heart sounds: Normal heart sounds. No murmur heard. Pulmonary:      Effort: Pulmonary effort is normal.      Breath sounds: Normal breath sounds. No wheezing or rales. Lymphadenopathy:      Cervical: No cervical adenopathy.    Neurological:      Mental Status: He is alert and oriented to person, place, and time. Assessment:      Anxiety   Testosterone Deficiency   Pulmonary Embolus  BPH  Erectile Dysfunction   Herpes Dermatitis       Plan:      Testosterone Level (will be addressed by Dr. Reynaldo Aleman)  Refilled medications  Flu Shot Declined   Cologuard Test Ordered   RTO as needed       Controlled Substance Monitoring:    Acute and Chronic Pain Monitoring:   RX Monitoring 11/3/2022   Attestation -   Periodic Controlled Substance Monitoring No signs of potential drug abuse or diversion identified.               [id number] Kira Yost, DO

## 2022-11-05 LAB
SEX HORMONE BINDING GLOBULIN: 25 NMOL/L (ref 11–80)
TESTOSTERONE FREE-NONMALE: 51 PG/ML (ref 47–244)
TESTOSTERONE TOTAL: 228 NG/DL (ref 220–1000)

## 2022-11-10 ENCOUNTER — TELEPHONE (OUTPATIENT)
Dept: FAMILY MEDICINE CLINIC | Age: 47
End: 2022-11-10

## 2022-11-11 NOTE — TELEPHONE ENCOUNTER
Submitted PA for Sildenafil Citrate 20MG tablets  Via [facility] Sildenafil Citrate 20MG tablets STATUS: PENDING

## 2022-11-11 NOTE — TELEPHONE ENCOUNTER
Which medication needs the PA? Please respond to the pool ( P [facility]). Thank you!

## 2022-11-23 NOTE — TELEPHONE ENCOUNTER
Called [facility] and spoke to Ivett Eubanks said that the PA is still being reviewed and there will be an outcome on or before 11/26/22;  Helena Prado said that there is a high volume of PAs being submitted since it is near the end of the year, so that is why the review process is taking so long. Sorry!

## 2022-11-25 NOTE — TELEPHONE ENCOUNTER
Please notify patient that the generic Viagra was denied by insurance because they do not cover medications for ED. He can still purchase the Rx out of pocket.

## 2022-11-25 NOTE — TELEPHONE ENCOUNTER
Received DENIAL for Sildenafil Citrate 20MG tablets. Denial letter attached. If this requires a response please respond to the pool ( [facility] [place] [address]). Thank you please advise patient.

## 2023-01-19 ENCOUNTER — OFFICE VISIT (OUTPATIENT)
Dept: ENDOCRINOLOGY | Age: 48
End: 2023-01-19
Payer: COMMERCIAL

## 2023-01-19 VITALS
WEIGHT: 194 LBS | SYSTOLIC BLOOD PRESSURE: 110 MMHG | HEIGHT: 72 IN | BODY MASS INDEX: 26.28 KG/M2 | TEMPERATURE: 98 F | HEART RATE: 70 BPM | RESPIRATION RATE: 14 BRPM | DIASTOLIC BLOOD PRESSURE: 78 MMHG

## 2023-01-19 DIAGNOSIS — N52.9 ERECTILE DYSFUNCTION, UNSPECIFIED ERECTILE DYSFUNCTION TYPE: ICD-10-CM

## 2023-01-19 DIAGNOSIS — R79.89 LOW TESTOSTERONE: Primary | ICD-10-CM

## 2023-01-19 PROCEDURE — 99213 OFFICE O/P EST LOW 20 MIN: CPT | Performed by: INTERNAL MEDICINE

## 2023-01-19 NOTE — PROGRESS NOTES
57 Y/o WM seen for evaluation of hypogonadism      Interim:      He had PE   testosterone stopped  Was on eliquis  Symptoms fluctuates  Sildenafil  as needed      C/o fatigue, sleepy, lack of motivated, loss of libido  Had a lot of caffeine    He has a h/o low testosterone for 2 years. Was seeing Dr. SEGOVIA \[facility]\"" [employer]. He retired. Does not remember the cause for low testosterone    Mild, stable, controlled    He was getting testosterone injections weekly. felt a lot better  Main symptoms : Fatigue, lack of motivation, reduced libido, ED    Level: 588 on 12/14    Treatment was stopped 5/17 as MD retired, symptoms came back    He denies any change in shaving frequency, no change in testicular or penile size. No gynecomastia, denies any breast discharge. There is no previous h/o mumps, no h/u use of narcotics or anabolic steroids. No h/o iron disorders in the family. Children : None  - does not plan for children    No h/o chemotherapy, radiation therapy, excessive alcohol. No h/o anosmia    7/17 Testosterone 390 LH/FSH/ Prolactin, B12/Vitamin D nl  Discussed with patient, level is normal so would not recommend replacement. Will repeat one more. He does have ED, start cialis    9/17 Testosterone 377   trial of clomid,25mg three times a week, did not feel better.     4/18  Testosterone 800 HCT  42  Peak level  On 100mg every 2 weeks  10/18 Testosterone 397  4 days after injection  3/19 Testosterone 692  HCT 43.9 mid cycle  level   150mg every 2 weeks  9/19 Testosterone 865   Mid cycle  2/20 Testosterone 876  Trough level    7/20  Testosterone 438     off testosterone    9/21 Testosterone  87  MRI pituitary normal    11/22 Testosterone 228  1 pm     Past Medical History:   Diagnosis Date    Anxiety     BPH with urinary obstruction     Chronic seasonal allergic rhinitis due to pollen     Condyloma acuminata     Erectile dysfunction of organic origin     Herpes dermatitis     Nostril    Hypercholesterolemia     Irritable bowel syndrome with diarrhea     Major depression single episode, in partial remission (HCC)      no meds    Right pulmonary embolus ([address] 02/10/2022    Dr. Juan Diego Macario / Julieta Shen for 6 months    Testosterone deficiency      Past Surgical History:   Procedure Laterality Date    BICEPS TENDON REPAIR Left 2/2/2022    DEBRIDEMENT BICEP TENODESIS performed by Sam Burk MD at [address] Right 12/20/2018    RIGHT CARPAL TUNNEL RELEASE performed by Alfredo Cormier MD at [address]  2008    [facility] Left Age 7 or 8    HAND SURGERY Right 2010    Thumb ORIF    KNEE ARTHROSCOPY Left 4/28/2021    LEFT KNEE ARTHROSCOPY, MEDIAL MENISCECTOMY performed by Sam Burk MD at [address] destruction with laser. MO SUTR PRPH NRV ARM/LEG XCP SCIATIC W/O TRPOS Right 12/20/2018    RIGHT ULNAR NERVE DECOMPRESSION AT ELBOW performed by Alfredo Cormier MD at [address] [facility] Left 2/2/2022    LEFT SHOULDER ARTHROSCOPY performed by Sam Burk MD at [address] [facility] Right 2000    WRIST SURGERY Right 2009    cyst removed    WRIST SURGERY Left 2012    Cyst removal     Current Outpatient Medications   Medication Sig Dispense Refill    Multiple Vitamins-Minerals (VITAMIN D3 COMPLETE PO) Take by mouth      Zinc Sulfate (ZINC 15 PO) Take by mouth      MAGNESIUM CITRATE PO Take by mouth      Acetylcysteine (NAC PO) Take by mouth      Turmeric (QC TUMERIC COMPLEX PO) Take by mouth      sildenafil (REVATIO) 20 MG tablet Take 1-2 tablets by mouth daily as needed (erectile dysfunction) 20 tablet 0    valACYclovir (VALTREX) 1 g tablet TAKE 2 TABLETS TWICE DAILY FOR ONE DAY FOR OUTBREAKS. (Patient not taking: Reported on 1/19/2023) 4 tablet 0     No current facility-administered medications for this visit.        Review of Systems  Scanned, reviewed        PHYSICAL EXAMINATION:    Vitals:    01/19/23 1426   BP: 110/78 Pulse: 70   Resp: 14   Temp: 98 °F (36.7 °C)       Constitutional: Well-developed, appears stated age, cooperative, in no acute distress  H/E/N/M/T:atraumatic, normocephalic, external ears, nose, lips normal without lesions  Eyes: Lids, lashes, conjunctivae and sclerae normal, No proptosis, no redness  Neck: supple, symmetrical, no swelling  Skin: No obvious rashes or lesions present. Skin and hair texture normal  Psychiatric: Judgement and Insight:  judgement and insight appear normal  Neuro: Normal without focal findings, speech is normal normal, speech is spontaneous  Chest: No labored breathing, no chest deformity, no stridor  Musculoskeletal: No joint deformity, swelling    Lab Reviewed       Assessment: 1. Low Testosterone: Symptomatic, levels were low normal, clomid did not help,he was  on low dose testosterone injection given previous history. Discussed side effect of replacement therapy. He felt better, symptoms improved, levels improved. Discussed if plans to continue or hold depending on symptoms, held. Level were normal off testosterone. He had DVT on treatment, avoid testosterone. Discussed risk of PE/DVT  Last level afternoon sample, check morning level  Also check DHEA level, may consider replacement    2. ED: Improved with PDE- inhibitor    Plan: 1. Testosterone, DHEA-S

## 2023-02-24 RX ORDER — VALACYCLOVIR HYDROCHLORIDE 1 G/1
TABLET, FILM COATED ORAL
Qty: 4 TABLET | Refills: 0 | Status: SHIPPED | OUTPATIENT
Start: 2023-02-24

## 2023-05-03 ENCOUNTER — TELEPHONE (OUTPATIENT)
Dept: ADMINISTRATIVE | Age: 48
End: 2023-05-03

## 2023-05-03 RX ORDER — SILDENAFIL CITRATE 20 MG/1
20-40 TABLET ORAL DAILY PRN
Qty: 20 TABLET | Refills: 0 | Status: SHIPPED | OUTPATIENT
Start: 2023-05-03

## 2023-05-03 NOTE — TELEPHONE ENCOUNTER
Last office visit 11/3/2022     Last written      Next office visit scheduled Visit date not found    Requested Prescriptions     Pending Prescriptions Disp Refills    sildenafil (REVATIO) 20 MG tablet 20 tablet 0     Sig: Take 1-2 tablets by mouth daily as needed (erectile dysfunction)

## 2023-06-08 RX ORDER — SILDENAFIL CITRATE 20 MG/1
20-40 TABLET ORAL DAILY PRN
Qty: 20 TABLET | Refills: 5 | Status: SHIPPED | OUTPATIENT
Start: 2023-06-08

## 2023-06-08 NOTE — TELEPHONE ENCOUNTER
Last office visit 11/3/2022     Last written      Next office visit scheduled Visit date not found    Requested Prescriptions     Pending Prescriptions Disp Refills    sildenafil (REVATIO) 20 MG tablet 20 tablet 0     Sig: Take 1-2 tablets by mouth daily as needed (erectile dysfunction) verbal instruction/written material

## 2023-12-27 ENCOUNTER — OFFICE VISIT (OUTPATIENT)
Dept: FAMILY MEDICINE CLINIC | Age: 48
End: 2023-12-27
Payer: COMMERCIAL

## 2023-12-27 VITALS
HEIGHT: 72 IN | SYSTOLIC BLOOD PRESSURE: 120 MMHG | DIASTOLIC BLOOD PRESSURE: 88 MMHG | WEIGHT: 183.6 LBS | BODY MASS INDEX: 24.87 KG/M2

## 2023-12-27 DIAGNOSIS — N52.9 ERECTILE DYSFUNCTION OF ORGANIC ORIGIN: ICD-10-CM

## 2023-12-27 DIAGNOSIS — N40.1 BPH WITH URINARY OBSTRUCTION: ICD-10-CM

## 2023-12-27 DIAGNOSIS — N13.8 BPH WITH URINARY OBSTRUCTION: ICD-10-CM

## 2023-12-27 DIAGNOSIS — B00.89 HERPES DERMATITIS: ICD-10-CM

## 2023-12-27 DIAGNOSIS — F41.9 ANXIETY: Primary | ICD-10-CM

## 2023-12-27 DIAGNOSIS — Z23 NEED FOR INFLUENZA VACCINATION: ICD-10-CM

## 2023-12-27 DIAGNOSIS — F32.4 MAJOR DEPRESSION SINGLE EPISODE, IN PARTIAL REMISSION (HCC): ICD-10-CM

## 2023-12-27 DIAGNOSIS — I26.99 RIGHT PULMONARY EMBOLUS (HCC): ICD-10-CM

## 2023-12-27 DIAGNOSIS — E34.9 TESTOSTERONE DEFICIENCY: ICD-10-CM

## 2023-12-27 PROCEDURE — 99213 OFFICE O/P EST LOW 20 MIN: CPT | Performed by: FAMILY MEDICINE

## 2023-12-27 RX ORDER — ALPRAZOLAM 0.5 MG/1
0.5 TABLET ORAL 3 TIMES DAILY PRN
Qty: 21 TABLET | Refills: 0 | Status: SHIPPED | OUTPATIENT
Start: 2023-12-27 | End: 2024-01-26

## 2023-12-27 ASSESSMENT — PATIENT HEALTH QUESTIONNAIRE - PHQ9
5. POOR APPETITE OR OVEREATING: SEVERAL DAYS
10. IF YOU CHECKED OFF ANY PROBLEMS, HOW DIFFICULT HAVE THESE PROBLEMS MADE IT FOR YOU TO DO YOUR WORK, TAKE CARE OF THINGS AT HOME, OR GET ALONG WITH OTHER PEOPLE: NOT DIFFICULT AT ALL
2. FEELING DOWN, DEPRESSED OR HOPELESS: NOT AT ALL
6. FEELING BAD ABOUT YOURSELF - OR THAT YOU ARE A FAILURE OR HAVE LET YOURSELF OR YOUR FAMILY DOWN: NOT AT ALL
9. THOUGHTS THAT YOU WOULD BE BETTER OFF DEAD, OR OF HURTING YOURSELF: NOT AT ALL
1. LITTLE INTEREST OR PLEASURE IN DOING THINGS: NOT AT ALL
7. TROUBLE CONCENTRATING ON THINGS, SUCH AS READING THE NEWSPAPER OR WATCHING TELEVISION: NOT AT ALL
4. FEELING TIRED OR HAVING LITTLE ENERGY: SEVERAL DAYS
8. MOVING OR SPEAKING SO SLOWLY THAT OTHER PEOPLE COULD HAVE NOTICED. OR THE OPPOSITE - BEING SO FIDGETY OR RESTLESS THAT YOU HAVE BEEN MOVING AROUND A LOT MORE THAN USUAL: NOT AT ALL
SUM OF ALL RESPONSES TO PHQ QUESTIONS 1-9: 3
3. TROUBLE FALLING OR STAYING ASLEEP: SEVERAL DAYS

## 2024-07-05 RX ORDER — SILDENAFIL CITRATE 20 MG/1
20-40 TABLET ORAL DAILY PRN
Qty: 20 TABLET | Refills: 5 | Status: SHIPPED | OUTPATIENT
Start: 2024-07-05

## 2024-09-16 ASSESSMENT — PATIENT HEALTH QUESTIONNAIRE - PHQ9
3. TROUBLE FALLING OR STAYING ASLEEP: SEVERAL DAYS
4. FEELING TIRED OR HAVING LITTLE ENERGY: SEVERAL DAYS
2. FEELING DOWN, DEPRESSED OR HOPELESS: NOT AT ALL
SUM OF ALL RESPONSES TO PHQ QUESTIONS 1-9: 2
7. TROUBLE CONCENTRATING ON THINGS, SUCH AS READING THE NEWSPAPER OR WATCHING TELEVISION: NOT AT ALL
6. FEELING BAD ABOUT YOURSELF - OR THAT YOU ARE A FAILURE OR HAVE LET YOURSELF OR YOUR FAMILY DOWN: NOT AT ALL
9. THOUGHTS THAT YOU WOULD BE BETTER OFF DEAD, OR OF HURTING YOURSELF: NOT AT ALL
2. FEELING DOWN, DEPRESSED OR HOPELESS: NOT AT ALL
4. FEELING TIRED OR HAVING LITTLE ENERGY: SEVERAL DAYS
5. POOR APPETITE OR OVEREATING: NOT AT ALL
10. IF YOU CHECKED OFF ANY PROBLEMS, HOW DIFFICULT HAVE THESE PROBLEMS MADE IT FOR YOU TO DO YOUR WORK, TAKE CARE OF THINGS AT HOME, OR GET ALONG WITH OTHER PEOPLE: NOT DIFFICULT AT ALL
1. LITTLE INTEREST OR PLEASURE IN DOING THINGS: NOT AT ALL
8. MOVING OR SPEAKING SO SLOWLY THAT OTHER PEOPLE COULD HAVE NOTICED. OR THE OPPOSITE - BEING SO FIDGETY OR RESTLESS THAT YOU HAVE BEEN MOVING AROUND A LOT MORE THAN USUAL: NOT AT ALL
5. POOR APPETITE OR OVEREATING: NOT AT ALL
SUM OF ALL RESPONSES TO PHQ QUESTIONS 1-9: 2
8. MOVING OR SPEAKING SO SLOWLY THAT OTHER PEOPLE COULD HAVE NOTICED. OR THE OPPOSITE, BEING SO FIGETY OR RESTLESS THAT YOU HAVE BEEN MOVING AROUND A LOT MORE THAN USUAL: NOT AT ALL
10. IF YOU CHECKED OFF ANY PROBLEMS, HOW DIFFICULT HAVE THESE PROBLEMS MADE IT FOR YOU TO DO YOUR WORK, TAKE CARE OF THINGS AT HOME, OR GET ALONG WITH OTHER PEOPLE: NOT DIFFICULT AT ALL
SUM OF ALL RESPONSES TO PHQ QUESTIONS 1-9: 2
SUM OF ALL RESPONSES TO PHQ QUESTIONS 1-9: 2
SUM OF ALL RESPONSES TO PHQ9 QUESTIONS 1 & 2: 0
6. FEELING BAD ABOUT YOURSELF - OR THAT YOU ARE A FAILURE OR HAVE LET YOURSELF OR YOUR FAMILY DOWN: NOT AT ALL
SUM OF ALL RESPONSES TO PHQ QUESTIONS 1-9: 2
7. TROUBLE CONCENTRATING ON THINGS, SUCH AS READING THE NEWSPAPER OR WATCHING TELEVISION: NOT AT ALL
1. LITTLE INTEREST OR PLEASURE IN DOING THINGS: NOT AT ALL
3. TROUBLE FALLING OR STAYING ASLEEP: SEVERAL DAYS
9. THOUGHTS THAT YOU WOULD BE BETTER OFF DEAD, OR OF HURTING YOURSELF: NOT AT ALL

## 2024-09-17 ENCOUNTER — OFFICE VISIT (OUTPATIENT)
Dept: FAMILY MEDICINE CLINIC | Age: 49
End: 2024-09-17
Payer: COMMERCIAL

## 2024-09-17 VITALS
SYSTOLIC BLOOD PRESSURE: 132 MMHG | DIASTOLIC BLOOD PRESSURE: 84 MMHG | WEIGHT: 191 LBS | HEIGHT: 72 IN | BODY MASS INDEX: 25.87 KG/M2

## 2024-09-17 DIAGNOSIS — F41.9 ANXIETY: ICD-10-CM

## 2024-09-17 DIAGNOSIS — Z00.00 PREVENTATIVE HEALTH CARE: ICD-10-CM

## 2024-09-17 DIAGNOSIS — N52.9 ERECTILE DYSFUNCTION OF ORGANIC ORIGIN: ICD-10-CM

## 2024-09-17 DIAGNOSIS — Z23 NEED FOR TDAP VACCINATION: ICD-10-CM

## 2024-09-17 DIAGNOSIS — N40.1 BPH WITH URINARY OBSTRUCTION: ICD-10-CM

## 2024-09-17 DIAGNOSIS — Z23 NEED FOR INFLUENZA VACCINATION: ICD-10-CM

## 2024-09-17 DIAGNOSIS — E34.9 TESTOSTERONE DEFICIENCY: ICD-10-CM

## 2024-09-17 DIAGNOSIS — B00.89 HERPES DERMATITIS: ICD-10-CM

## 2024-09-17 DIAGNOSIS — N13.8 BPH WITH URINARY OBSTRUCTION: ICD-10-CM

## 2024-09-17 DIAGNOSIS — Z00.00 WELL ADULT EXAM: Primary | ICD-10-CM

## 2024-09-17 DIAGNOSIS — I26.99 RIGHT PULMONARY EMBOLUS (HCC): ICD-10-CM

## 2024-09-17 DIAGNOSIS — F32.4 MAJOR DEPRESSION SINGLE EPISODE, IN PARTIAL REMISSION (HCC): ICD-10-CM

## 2024-09-17 PROCEDURE — 90471 IMMUNIZATION ADMIN: CPT | Performed by: FAMILY MEDICINE

## 2024-09-17 PROCEDURE — 99396 PREV VISIT EST AGE 40-64: CPT | Performed by: FAMILY MEDICINE

## 2024-09-17 PROCEDURE — 90715 TDAP VACCINE 7 YRS/> IM: CPT | Performed by: FAMILY MEDICINE

## 2024-09-17 PROCEDURE — 36415 COLL VENOUS BLD VENIPUNCTURE: CPT | Performed by: FAMILY MEDICINE

## 2024-09-17 RX ORDER — SILDENAFIL CITRATE 20 MG/1
20-40 TABLET ORAL DAILY PRN
Qty: 20 TABLET | Refills: 5 | Status: SHIPPED | OUTPATIENT
Start: 2024-09-17

## 2024-09-17 ASSESSMENT — ENCOUNTER SYMPTOMS
DIARRHEA: 0
NAUSEA: 0
ABDOMINAL PAIN: 0
CONSTIPATION: 0
WHEEZING: 0
SHORTNESS OF BREATH: 0
COUGH: 0
SORE THROAT: 0
BLOOD IN STOOL: 0
VOMITING: 0
RHINORRHEA: 0

## 2024-09-18 LAB
25(OH)D3 SERPL-MCNC: 42.2 NG/ML
ALT SERPL-CCNC: 33 U/L (ref 10–40)
ANION GAP SERPL CALCULATED.3IONS-SCNC: 13 MMOL/L (ref 3–16)
AST SERPL-CCNC: 43 U/L (ref 15–37)
BUN SERPL-MCNC: 11 MG/DL (ref 7–20)
CALCIUM SERPL-MCNC: 9.8 MG/DL (ref 8.3–10.6)
CHLORIDE SERPL-SCNC: 104 MMOL/L (ref 99–110)
CHOLEST SERPL-MCNC: 272 MG/DL (ref 0–199)
CO2 SERPL-SCNC: 26 MMOL/L (ref 21–32)
CREAT SERPL-MCNC: 1.1 MG/DL (ref 0.9–1.3)
DEPRECATED RDW RBC AUTO: 13.3 % (ref 12.4–15.4)
GFR SERPLBLD CREATININE-BSD FMLA CKD-EPI: 83 ML/MIN/{1.73_M2}
GLUCOSE SERPL-MCNC: 75 MG/DL (ref 70–99)
HCT VFR BLD AUTO: 42.5 % (ref 40.5–52.5)
HDLC SERPL-MCNC: 72 MG/DL (ref 40–60)
HGB BLD-MCNC: 14.7 G/DL (ref 13.5–17.5)
LDLC SERPL CALC-MCNC: 156 MG/DL
MCH RBC QN AUTO: 33.2 PG (ref 26–34)
MCHC RBC AUTO-ENTMCNC: 34.5 G/DL (ref 31–36)
MCV RBC AUTO: 96.3 FL (ref 80–100)
PLATELET # BLD AUTO: 246 K/UL (ref 135–450)
PMV BLD AUTO: 9.5 FL (ref 5–10.5)
POTASSIUM SERPL-SCNC: 4.2 MMOL/L (ref 3.5–5.1)
RBC # BLD AUTO: 4.42 M/UL (ref 4.2–5.9)
SODIUM SERPL-SCNC: 143 MMOL/L (ref 136–145)
TRIGL SERPL-MCNC: 221 MG/DL (ref 0–150)
TSH SERPL DL<=0.005 MIU/L-ACNC: 1.51 UIU/ML (ref 0.27–4.2)
VLDLC SERPL CALC-MCNC: 44 MG/DL
WBC # BLD AUTO: 4.6 K/UL (ref 4–11)

## 2024-11-24 DIAGNOSIS — N52.9 ERECTILE DYSFUNCTION OF ORGANIC ORIGIN: ICD-10-CM

## 2024-11-25 RX ORDER — SILDENAFIL CITRATE 20 MG/1
20-40 TABLET ORAL DAILY PRN
Qty: 20 TABLET | Refills: 5 | Status: SHIPPED | OUTPATIENT
Start: 2024-11-25

## 2025-05-29 DIAGNOSIS — N52.9 ERECTILE DYSFUNCTION OF ORGANIC ORIGIN: ICD-10-CM

## 2025-05-29 RX ORDER — SILDENAFIL CITRATE 20 MG/1
20-40 TABLET ORAL DAILY PRN
Qty: 20 TABLET | Refills: 5 | Status: SHIPPED | OUTPATIENT
Start: 2025-05-29

## 2025-05-29 NOTE — TELEPHONE ENCOUNTER
Last office visit 9/17/2024    Next office visit scheduled Visit date not found    Requested Prescriptions     Pending Prescriptions Disp Refills    sildenafil (REVATIO) 20 MG tablet 20 tablet 5     Sig: Take 1-2 tablets by mouth daily as needed (erectile dysfunction)

## 2025-08-11 DIAGNOSIS — N52.9 ERECTILE DYSFUNCTION OF ORGANIC ORIGIN: ICD-10-CM

## 2025-08-11 RX ORDER — SILDENAFIL CITRATE 20 MG/1
20-40 TABLET ORAL DAILY PRN
Qty: 20 TABLET | Refills: 0 | Status: SHIPPED | OUTPATIENT
Start: 2025-08-11

## (undated) DEVICE — HYPODERMIC SAFETY NEEDLE: Brand: MAGELLAN

## (undated) DEVICE — BANDAGE COMPR W4INXL12FT E DISP ESMARCH EVEN

## (undated) DEVICE — MERCY HEALTH WEST TURNOVER: Brand: MEDLINE INDUSTRIES, INC.

## (undated) DEVICE — NEEDLE SUT MAYO CATGUT NO 2 TAPR PT 1/2 CIR

## (undated) DEVICE — SUTURE VCRL + SZ 0 L18IN ABSRB UD L36MM CT-1 1/2 CIR VCP840D

## (undated) DEVICE — BANDAGE ELASTIC 5YDX4IN ST COMPRSS LF NON ADH

## (undated) DEVICE — SHEET,DRAPE,53X77,STERILE: Brand: MEDLINE

## (undated) DEVICE — SHOULDER ARTHROSCOPY: Brand: MEDLINE INDUSTRIES, INC.

## (undated) DEVICE — GOWN,REINF,POLY,AURORA,XLNG/XL,STRL: Brand: MEDLINE

## (undated) DEVICE — TUBING, SUCTION, 1/4" X 12', STRAIGHT: Brand: MEDLINE

## (undated) DEVICE — NEEDLE HYPO 23GA L1.5IN TURQ POLYPR HUB S STL THN WALL IM

## (undated) DEVICE — SOLUTION IV IRRIG 500ML 0.9% SODIUM CHL 2F7123

## (undated) DEVICE — ZIMMER® STERILE DISPOSABLE TOURNIQUET CUFF WITH PLC, DUAL PORT, SINGLE BLADDER, 30 IN. (76 CM)

## (undated) DEVICE — SOLUTION IV IRRIG POUR BRL 0.9% SODIUM CHL 2F7124

## (undated) DEVICE — NEEDLE HYPO 18GA L1.5IN THN WALL PIVOTING SHLD BVL ORIENTED

## (undated) DEVICE — APPLIER CLP L9.375IN APER 2.1MM CLS L3.8MM 20 SM TI CLP

## (undated) DEVICE — SOLUTION IRRIG 3000ML LAC RINGERS ARTHROMTC PLAS CONT

## (undated) DEVICE — 3M™ TEGADERM™ TRANSPARENT FILM DRESSING FRAME STYLE, 1626W, 4 IN X 4-3/4 IN (10 CM X 12 CM), 50/CT 4CT/CASE: Brand: 3M™ TEGADERM™

## (undated) DEVICE — Device

## (undated) DEVICE — GLOVE SURG SZ 65 L12IN FNGR THK87MIL WHT LTX FREE

## (undated) DEVICE — BLADE SHV L13CM DIA4MM EXCALIBUR AGG COOLCUT

## (undated) DEVICE — PADDING UNDERCAST W4INXL4YD 100% COT CRIMPED FINISH WBRL II

## (undated) DEVICE — CHLORAPREP 26ML ORANGE

## (undated) DEVICE — STERILE POLYISOPRENE POWDER-FREE SURGICAL GLOVES: Brand: PROTEXIS

## (undated) DEVICE — SPONGE GZ W4XL4IN COT 12 PLY TYP VII WVN C FLD DSGN

## (undated) DEVICE — Z CONVERTED USE 2273164 BANDAGE COMPR W4INXL4 1/2YD E EC SGL LAYERED CLP CLSR ECONO

## (undated) DEVICE — 3M™ STERI-DRAPE™ U-DRAPE 1015: Brand: STERI-DRAPE™

## (undated) DEVICE — SUTURE CHROMIC GUT SZ 4-0 L27IN ABSRB BRN FS-2 L19MM 3/8 635H

## (undated) DEVICE — STERILE LATEX POWDER-FREE SURGICAL GLOVESWITH NITRILE COATING: Brand: PROTEXIS

## (undated) DEVICE — TUBING PMP L16FT MAIN DISP FOR AR-6400 AR-6475

## (undated) DEVICE — SUTURE VCRL SZ 3-0 L27IN ABSRB UD L26MM SH 1/2 CIR J416H

## (undated) DEVICE — DRAPE HND W114XL142IN BLU POLYPR W O PCH FEN CRD AND TB HLDR

## (undated) DEVICE — SPONGE LAP W18XL18IN WHT COT 4 PLY FLD STRUNG RADPQ DISP ST

## (undated) DEVICE — STRIP,CLOSURE,WOUND,MEDI-STRIP,1/2X4: Brand: MEDLINE

## (undated) DEVICE — ELECTRODE ELECSURG NDL 2.8 INX7.2 CM COAT INSUL EDGE

## (undated) DEVICE — DRAPE,REIN 53X77,STERILE: Brand: MEDLINE

## (undated) DEVICE — SYRINGE MED 10ML LUERLOCK TIP W/O SFTY DISP

## (undated) DEVICE — Z DISCONTINUED USE 2272117 DRAPE SURG 3 QTR N INVASIVE 2 LAYR DISP

## (undated) DEVICE — BANDAGE COMPR W2INXL5YD TAN BRTH SELF ADH WRP W/ HND TEAR

## (undated) DEVICE — CANNULA ARTHSCP L7CM DIA6MM CONIC TIP THRD NONSHIELDED DISP

## (undated) DEVICE — COVER LT HNDL BLU PLAS

## (undated) DEVICE — ZIMMER® STERILE DISPOSABLE TOURNIQUET CUFF WITH PLC, DUAL PORT, SINGLE BLADDER, 18 IN. (46 CM)

## (undated) DEVICE — SUTURE VCRL + SZ 2-0 L18IN ABSRB UD CT1 L36MM 1/2 CIR VCP839D

## (undated) DEVICE — DRAPE,U/SHT,SPLIT,FILM,60X84,STERILE: Brand: MEDLINE

## (undated) DEVICE — SYRINGE IRRIG 60ML SFT PLIABLE BLB EZ TO GRP 1 HND USE W/

## (undated) DEVICE — PROBE ABLAT XL 90DEG ASPIR BPLR RF 1 PC ELECTRD ERGO HNDL

## (undated) DEVICE — APPLICATOR MEDICATED 26 CC SOLUTION HI LT ORNG CHLORAPREP

## (undated) DEVICE — GOWN SIRUS NONREIN XL W/TWL: Brand: MEDLINE INDUSTRIES, INC.

## (undated) DEVICE — BLADE SHV L13CM DIA4MM TAPR TIP SCIS LIKE CUT OVL OUTER

## (undated) DEVICE — STOCKINETTE,IMPERVIOUS,12X48,STERILE: Brand: MEDLINE

## (undated) DEVICE — Z DISCONTINUED USE 2275676 GLOVE SURG SZ 65 L12IN FNGR THK87MIL DK GRN LTX FREE ISOLEX

## (undated) DEVICE — INTENDED FOR TISSUE SEPARATION, AND OTHER PROCEDURES THAT REQUIRE A SHARP SURGICAL BLADE TO PUNCTURE OR CUT.: Brand: BARD-PARKER ® STAINLESS STEEL BLADES

## (undated) DEVICE — SUTURE MCRYL + SZ 4-0 L18IN ABSRB UD L19MM PS-2 3/8 CIR MCP496G

## (undated) DEVICE — UNDERGLOVE SURG SZ 8 FNGR THK0.21MIL GRN LTX BEAD CUF

## (undated) DEVICE — NEEDLE HYPO 25GA L1.5IN BVL ORIENTED ECLIPSE

## (undated) DEVICE — INTENT TO BE USED WITH SUTURE MATERIAL FOR TISSUE CLOSURE: Brand: RICHARD-ALLAN® NEEDLE 1/2 CIRCLE TAPER

## (undated) DEVICE — 3M™ COBAN™ NL STERILE NON-LATEX SELF-ADHERENT WRAP, 2084S, 4 IN X 5 YD (10 CM X 4,5 M), 18 ROLLS/CASE: Brand: 3M™ COBAN™

## (undated) DEVICE — NEEDLE SPNL QNCKE 18 GAX5 IN

## (undated) DEVICE — BANDAGE COMPR W4INXL15FT BGE E SGL LAYERED CLP CLSR

## (undated) DEVICE — ALCOHOL  RUBBING 70PERC ISOPROPYL  16-OZ

## (undated) DEVICE — GLOVE SURG SZ 8 L12IN FNGR THK94MIL STD WHT LTX SYN POLYMER

## (undated) DEVICE — SUTURE NONABSORBABLE MONOFILAMENT 4-0 FS-2 18 IN ETHILON 662H

## (undated) DEVICE — APPLIER LIG CLP M L11IN TI STR RNG HNDL FOR 20 CLP DISP

## (undated) DEVICE — MINOR SET UP PK

## (undated) DEVICE — 4-PORT MANIFOLD: Brand: NEPTUNE 2

## (undated) DEVICE — MAJOR SET UP: Brand: MEDLINE INDUSTRIES, INC.

## (undated) DEVICE — DRESSING PETRO W3XL3IN OIL EMUL N ADH GZ KNIT IMPREG CELOS